# Patient Record
Sex: FEMALE | Race: WHITE | NOT HISPANIC OR LATINO | Employment: OTHER | URBAN - NONMETROPOLITAN AREA
[De-identification: names, ages, dates, MRNs, and addresses within clinical notes are randomized per-mention and may not be internally consistent; named-entity substitution may affect disease eponyms.]

---

## 2017-01-09 ENCOUNTER — OFFICE VISIT (OUTPATIENT)
Dept: INTERNAL MEDICINE | Facility: CLINIC | Age: 62
End: 2017-01-09

## 2017-01-09 VITALS
BODY MASS INDEX: 28.4 KG/M2 | SYSTOLIC BLOOD PRESSURE: 120 MMHG | DIASTOLIC BLOOD PRESSURE: 70 MMHG | HEIGHT: 66 IN | WEIGHT: 176.7 LBS

## 2017-01-09 DIAGNOSIS — N39.0 URINARY TRACT INFECTION, SITE UNSPECIFIED: Primary | ICD-10-CM

## 2017-01-09 DIAGNOSIS — R31.9 HEMATURIA: ICD-10-CM

## 2017-01-09 LAB
BILIRUB BLD-MCNC: NEGATIVE MG/DL
CLARITY, POC: ABNORMAL
COLOR UR: YELLOW
GLUCOSE UR STRIP-MCNC: NEGATIVE MG/DL
KETONES UR QL: NEGATIVE
LEUKOCYTE EST, POC: ABNORMAL
NITRITE UR-MCNC: NEGATIVE MG/ML
PH UR: 6 [PH] (ref 5–8)
PROT UR STRIP-MCNC: ABNORMAL MG/DL
RBC # UR STRIP: ABNORMAL /UL
SP GR UR: 1.01 (ref 1–1.03)
UROBILINOGEN UR QL: NORMAL

## 2017-01-09 PROCEDURE — 99212 OFFICE O/P EST SF 10 MIN: CPT | Performed by: INTERNAL MEDICINE

## 2017-01-09 PROCEDURE — 81002 URINALYSIS NONAUTO W/O SCOPE: CPT | Performed by: INTERNAL MEDICINE

## 2017-01-09 RX ORDER — PHENAZOPYRIDINE HYDROCHLORIDE 95 MG/1
95 TABLET ORAL 3 TIMES DAILY PRN
Qty: 15 TABLET | Refills: 0 | Status: SHIPPED | OUTPATIENT
Start: 2017-01-09 | End: 2017-01-12

## 2017-01-09 RX ORDER — CIPROFLOXACIN 500 MG/1
500 TABLET, FILM COATED ORAL 2 TIMES DAILY
Qty: 14 TABLET | Refills: 0 | Status: SHIPPED | OUTPATIENT
Start: 2017-01-09 | End: 2017-09-08

## 2017-01-09 RX ORDER — NITROFURANTOIN 25; 75 MG/1; MG/1
100 CAPSULE ORAL 2 TIMES DAILY
Refills: 0 | COMMUNITY
Start: 2016-12-24 | End: 2018-01-04

## 2017-01-09 NOTE — MR AVS SNAPSHOT
Humera Swartz   1/9/2017 3:15 PM   Office Visit    Dept Phone:  [phone]   Encounter #:  [id number]    Provider:  Devora Jiménez MD   Department:  [facility] MEDIC[facility]                Your Full Care Plan              Today's Medication Changes          These changes are accurate as of: 1/9/17  4:48 PM.  If you have any questions, ask your nurse or doctor.               New Medication(s)Ordered:     ciprofloxacin 500 MG tablet   Commonly known as:  CIPRO   Take 1 tablet by mouth 2 (Two) Times a Day.   Started by:  Devora Jiménez MD       phenazopyridine 95 MG tablet   Commonly known as:  PYRIDIUM   Take 1 tablet by mouth 3 (Three) Times a Day As Needed for bladder spasms.   Started by:  Devora Jiménez MD            Where to Get Your Medications      These medications were sent to [facility]/pharmacy #[id number] - [place], [place] - [address] [place] [address] [phone] [facility] [phone] [address] [address]     Phone:  [phone]     ciprofloxacin 500 MG tablet    phenazopyridine 95 MG tablet                  Your Updated Medication List          This list is accurate as of: 1/9/17  4:48 PM.  Always use your most recent med list.                anastrozole 1 MG tablet   Commonly known as:  ARIMIDEX       busPIRone 5 MG tablet   Commonly known as:  BUSPAR   TAKE 1 TABLET(S) BY MOUTH 2 TIMES PER DAY       ciprofloxacin 500 MG tablet   Commonly known as:  CIPRO   Take 1 tablet by mouth 2 (Two) Times a Day.       clobetasol propionate 0.05 % shampoo   Commonly known as:  CLOBEX       fluticasone 50 MCG/ACT nasal spray   Commonly known as:  FLONASE       nitrofurantoin (macrocrystal-monohydrate) 100 MG capsule   Commonly known as:  MACROBID       omeprazole 20 MG capsule   Commonly known as:  priLOSEC   TAKE 1 CAP(S) BY MOUTH DAILY       phenazopyridine 95 MG tablet   Commonly known as:  PYRIDIUM   Take 1 tablet by mouth 3 (Three) Times a Day As Needed for  "bladder spasms.       sertraline 100 MG tablet   Commonly known as:  ZOLOFT   TAKE 1/2 TABLET BY MOUTH EVERY DAY               We Performed the Following     POC Urinalysis Dipstick       You Were Diagnosed With        Codes Comments    Urinary tract infection, site unspecified    -  Primary ICD-10-CM: N39.0       Instructions     None    Patient Instructions History      Upcoming Appointments     Visit Type Date Time Department    OFFICE VISIT 2017  3:15 PM [facility]      [facility] Signup     [facility] allows you to send messages to your doctor, view your test results, renew your prescriptions, schedule appointments, and more. To sign up, go to [employer] and click on the Sign Up Now link in the New User? box. Enter your [employer] Activation Code exactly as it appears below along with the last four digits of your Social Security Number and your Date of Birth () to complete the sign-up process. If you do not sign up before the expiration date, you must request a new code.    [employer] Activation Code: [email or web address]  Expires: 2017  4:48 PM    If you have questions, you can email [email or web address] or call [phone] to talk to our [employer] staff. Remember, [employer] is NOT to be used for urgent needs. For medical emergencies, dial 911.               Other Info from Your Visit           Allergies     Erythromycin      Phenergan [Promethazine Hcl]      Promethazine      Propofol      COUGH/WHEEZE    Tetracyclines & Related        Reason for Visit     Abdominal Pain lower right side being treated for uti now      Vital Signs     Blood Pressure Height Weight Body Mass Index Smoking Status       120/70 66\" (167.6 cm) 176 lb 11.2 oz (80.2 kg) 28.52 kg/m2 Former Smoker       Problems and Diagnoses Noted     Urinary tract infection    -  Primary      Results     POC Urinalysis Dipstick      Component Value Standard Range & Units    Color Yellow Yellow, Straw, Dark " Yellow, Cathie    Clarity, UA Cloudy Clear    Glucose, UA Negative Negative, 1000 mg/dL (3+) mg/dL    Bilirubin Negative Negative    Ketones, UA Negative Negative    Specific Gravity  1.015 1.005 - 1.030    Blood, UA Trace Negative    pH, Urine 6.0 5.0 - 8.0    Protein, POC Trace Negative mg/dL    Urobilinogen, UA Normal Normal    Leukocytes Trace Negative    Nitrite, UA Negative Negative

## 2017-01-10 ENCOUNTER — HOSPITAL ENCOUNTER (OUTPATIENT)
Dept: OTHER | Facility: HOSPITAL | Age: 62
Setting detail: RADIATION/ONCOLOGY SERIES
Discharge: HOME | End: 2017-01-20
Attending: INTERNAL MEDICINE | Admitting: INTERNAL MEDICINE

## 2017-01-10 LAB
ALBUMIN SERPL-MCNC: 4.2 GM/DL (ref 3.4–4.8)
ALP SERPL-CCNC: 106 U/L (ref 38–126)
ALT SERPL-CCNC: 38 U/L (ref 9–52)
ANION GAP SERPL CALCULATED.3IONS-SCNC: 11 MMOL/L (ref 5–15)
AST SERPL-CCNC: 27 U/L (ref 14–36)
BASOPHILS # BLD AUTO: 0.03 X1000/UL (ref 0–0.2)
BASOPHILS NFR BLD AUTO: 0.5 % (ref 0–2)
BILIRUB SERPL-MCNC: 0.9 MG/DL (ref 0.2–1.3)
BUN SERPL-MCNC: 17 MG/DL (ref 7–21)
CALCIUM SERPL-MCNC: 9.5 MG/DL (ref 8.4–10.2)
CHLORIDE SERPL-SCNC: 101 MMOL/L (ref 95–110)
CO2 SERPL-SCNC: 29 MMOL/L (ref 22–31)
CONV AUTO IG#: 0.01 X1000/UL (ref 0.01–0.02)
CONV AUTO IG%: 0.2 % (ref 0–0.5)
CREAT SERPL-MCNC: 0.7 MG/DL (ref 0.5–1)
EOSINOPHIL # BLD AUTO: 0.11 X1000/UL (ref 0–0.7)
EOSINOPHIL NFR BLD AUTO: 1.8 % (ref 0–7)
ERYTHROCYTE [DISTWIDTH] IN BLOOD: 13.6 % (ref 11.5–14.5)
GLUCOSE SERPL-MCNC: 110 MG/DL (ref 60–100)
GRANULOCYTES # BLD AUTO: 3.99 X1000/UL (ref 2–8.6)
GRANULOCYTES NFR BLD AUTO: 65.5 % (ref 37–80)
HCT VFR BLD CALC: 42.2 % (ref 35–45)
HGB BLD-MCNC: 14.5 GM/DL (ref 12–15.5)
LYMPHOCYTES # BLD AUTO: 1.59 X1000/UL (ref 0.6–4.2)
LYMPHOCYTES NFR BLD AUTO: 26.2 % (ref 10–50)
MCH RBC QN: 30.5 PG (ref 26–34)
MCHC RBC-ENTMCNC: 34.4 GM/DL (ref 31.4–36)
MCV RBC: 88.7 FL (ref 80–98)
MONOCYTES # BLD AUTO: 0.35 X1000/UL (ref 0–0.9)
MONOCYTES NFR BLD AUTO: 5.8 % (ref 0–12)
NRBC BLD AUTO-RTO: 0 % (ref 0–0.2)
NRBC SPEC MANUAL: 0 X1000/UL
PLATELET # BLD: 229 X1000/MM3 (ref 150–450)
PMV BLD: 10 FL (ref 8–12)
POTASSIUM SERPL-SCNC: 3.7 MMOL/L (ref 3.5–5.1)
PROT SERPL-MCNC: 7.4 GM/DL (ref 6.3–8.6)
RBC # BLD: 4.76 MEGA/MM3 (ref 3.77–5.16)
SODIUM SERPL-SCNC: 141 MMOL/L (ref 137–145)
WBC # BLD: 6.1 X1000/UL (ref 3.2–9.8)

## 2017-01-10 NOTE — PROGRESS NOTES
Subjective   Humera Swartz is a 61 y.o. female who presents complaining of lower abdominal pain, frequency, urgency of urination and dysuria.  She also had some right sided flank pain.  Patient was seen in [facility] and was prescribed Macrobid for UTI without any improvement in her symptoms.  She had recurrent UTI s in the last few months.  POC Urinalysis dipstick is positive for leukocytes and blood.        Past Medical History   Diagnosis Date   • Acquired equinus deformity of foot      ANKLE   • Anxiety    • Cancer      BREAST   • Depression    • Diverticular disease of colon    • Esophagitis    • GERD (gastroesophageal reflux disease)    • History of bone density study 2012     NORMAL   • History of echocardiogram 2016     Normal LV systolic function.Ef of 55-60%.Grade 1 diastolic dysfunciton of the LV myocardium.Mild left atiral enlargement.No evidence of pericardial effusion   • History of mammogram 2011     one breast (Benign finding.)   • History of Papanicolaou smear of cervix 2011     NEGATIVE   • Hyperlipidemia    • Injury of head and neck    • Minor head injury    • Personal history of malignant neoplasm of breast    • Plantar fasciitis    • Primary fibromyalgia syndrome    • Solitary pulmonary nodule present on computed tomography of lung      Past Surgical History   Procedure Laterality Date   •  section     • Colonoscopy  2016     Normal colon.No specimens collected   • Esophagoscopy / egd  2016     Mildly severe esophagitis.Gastritis.Normal examined duodenum.Medium sized hiatus hernia   • Tubal abdominal ligation     • Hysteroscopy  2011     Exam under anesthesia, diagnostic hysterectomy with fractional dilation and curettage. Thickened endometrial stripe, on Tamoxifen therapy.   • Injection of medication  2012     Kenalog (1)    • Diagnostic laparoscopy  1977     (Amenorrhea, probable polycystic ovaries. Polycystic ovaries   •  Breast surgery       Carcinoma of the right breast;Mastectomy   • Other surgical history  02/12/2016     NEEDLE BIOPSY LYMPH NODES; Ultrasound directed core needle biopsy of the left axilla.   • Excision breast lesion w/ preop needle loc  06/17/1987     Bilateral excision of breast masses. Bilateral breast masses; probable fibrocystic disease.       Social History   Substance Use Topics   • Smoking status: Former Smoker   • Smokeless tobacco: Never Used      Comment: smoked socially long time ago   • Alcohol use No     Family History   Problem Relation Age of Onset   • Diabetes Other    • Arthritis Other      Allergies   Allergen Reactions   • Erythromycin    • Phenergan [Promethazine Hcl]    • Promethazine    • Propofol      COUGH/WHEEZE   • Tetracyclines & Related      Current Outpatient Prescriptions on File Prior to Visit   Medication Sig Dispense Refill   • anastrozole (ARIMIDEX) 1 MG chemo tablet TAKE 1 TABLET BY MOUTH DAILY.  6   • busPIRone (BUSPAR) 5 MG tablet TAKE 1 TABLET(S) BY MOUTH 2 TIMES PER DAY 30 tablet 0   • clobetasol propionate (CLOBEX) 0.05 % shampoo APPLY TO AFFECTED AREA TWICE A DAY LIMIT 4 WEEK INTERVALS DO NOT APPLY TO FACE OR FOLDS  4   • sertraline (ZOLOFT) 100 MG tablet TAKE 1/2 TABLET BY MOUTH EVERY DAY 30 tablet 2   • fluticasone (FLONASE) 50 MCG/ACT nasal spray INSTILL 2 SPRYAS INTO EACH NOSTRIL DAILY  0   • omeprazole (PriLOSEC) 20 MG capsule TAKE 1 CAP(S) BY MOUTH DAILY 30 capsule 0     No current facility-administered medications on file prior to visit.      Review of Systems   Constitutional: Negative for chills and fever.   Respiratory: Negative for cough and shortness of breath.    Cardiovascular: Negative for chest pain and leg swelling.   Gastrointestinal: Positive for nausea. Negative for constipation, diarrhea and vomiting.        Positive for lower abdominal pain   Genitourinary: Positive for dysuria, flank pain, frequency and urgency. Negative for vaginal bleeding, vaginal  discharge and vaginal pain.       Objective   Physical Exam   Constitutional: She is oriented to person, place, and time. She appears well-developed and well-nourished.   HENT:   Head: Normocephalic and atraumatic.   Neck: Neck supple. No JVD present.   Cardiovascular: Normal rate and regular rhythm.    Pulmonary/Chest: Effort normal and breath sounds normal.   Abdominal: Soft. She exhibits no mass. There is tenderness. No hernia.   Mild tenderness on palpation of lower abdominal area   Neurological: She is alert and oriented to person, place, and time.     Results for orders placed or performed in visit on 01/09/17   POC Urinalysis Dipstick   Result Value Ref Range    Color Yellow Yellow, Straw, Dark Yellow, Cathie    Clarity, UA Cloudy (A) Clear    Glucose, UA Negative Negative, 1000 mg/dL (3+) mg/dL    Bilirubin Negative Negative    Ketones, UA Negative Negative    Specific Gravity  1.015 1.005 - 1.030    Blood, UA Trace (A) Negative    pH, Urine 6.0 5.0 - 8.0    Protein, POC Trace (A) Negative mg/dL    Urobilinogen, UA Normal Normal    Leukocytes Trace (A) Negative    Nitrite, UA Negative Negative         There is no problem list on file for this patient.              Assessment    Diagnosis Plan   1. Urinary tract infection, site unspecified  POC Urinalysis Dipstick    Urinalysis (clean catch)   2. Hematuria           Plan   Cipro 500 mg bid for 1 week.  Phenazopyridine 95 mg tid PRN.  Advised to increase intake of fluids.  Will recheck urinalysis after she finishes antibiotics.  If hematuria persists, patient will be referred to urologist for further evaluation.

## 2017-01-11 LAB — CANCER AG27-29 SERPL-ACNC: 22.9 U/ML (ref 0–38.6)

## 2017-01-12 ENCOUNTER — OFFICE VISIT (OUTPATIENT)
Dept: GASTROENTEROLOGY | Facility: CLINIC | Age: 62
End: 2017-01-12

## 2017-01-12 VITALS
BODY MASS INDEX: 28.61 KG/M2 | HEART RATE: 75 BPM | HEIGHT: 66 IN | DIASTOLIC BLOOD PRESSURE: 71 MMHG | WEIGHT: 178 LBS | SYSTOLIC BLOOD PRESSURE: 125 MMHG

## 2017-01-12 DIAGNOSIS — K21.00 GASTROESOPHAGEAL REFLUX DISEASE WITH ESOPHAGITIS: ICD-10-CM

## 2017-01-12 DIAGNOSIS — K44.9 HIATAL HERNIA: ICD-10-CM

## 2017-01-12 DIAGNOSIS — R10.13 EPIGASTRIC PAIN: Primary | ICD-10-CM

## 2017-01-12 PROCEDURE — 99213 OFFICE O/P EST LOW 20 MIN: CPT | Performed by: NURSE PRACTITIONER

## 2017-01-12 RX ORDER — DEXLANSOPRAZOLE 60 MG/1
60 CAPSULE, DELAYED RELEASE ORAL DAILY
Qty: 30 CAPSULE | Refills: 5 | Status: SHIPPED | OUTPATIENT
Start: 2017-01-12 | End: 2017-02-11

## 2017-01-12 RX ORDER — RANITIDINE 150 MG/1
150 TABLET ORAL 2 TIMES DAILY
COMMUNITY
End: 2017-01-12

## 2017-01-12 NOTE — PROGRESS NOTES
Chief Complaint   Patient presents with   • Abdominal Pain     Right Lower Quadrant   • Patel's Esophagus   • Reoccuring Urinary Tract Infection       Subjective    Humera Swartz is a 61 y.o. female. she is here today for follow-up.    Abdominal Pain   This is a chronic problem. The current episode started more than 1 year ago. The pain is located in the RLQ. The pain is mild. The quality of the pain is colicky. The abdominal pain radiates to the back and RUQ. Associated symptoms include hematochezia. Pertinent negatives include no anorexia, arthralgias, diarrhea, fever, flatus, melena, myalgias, nausea, vomiting or weight loss. The treatment provided mild relief. Prior diagnostic workup includes GI consult and upper endoscopy.   patient   I would like to discuss recurrent epigastric pain that radiates to the right side of her back.  Previous he had a EGD colonoscopy,ultrasound of the right upper quadrant and CT scan.  Reports she was concern for side effects from Prilosec and change to Zantac and had worsening symptoms.  Previous EGD noted esophagitis and gastritis.  Have discussed dietary measures to decrease GERD symptoms.   PLAN; Patient will start Dexilant and follow-up in 6 weeks if symptoms persist or worsen return sooner.  We'll consider pH study.    The following portions of the patient's history were reviewed and updated as appropriate:   Past Medical History   Diagnosis Date   • Acquired equinus deformity of foot      ANKLE   • Anxiety    • Cancer      BREAST   • Depression    • Diverticular disease of colon    • Esophagitis    • GERD (gastroesophageal reflux disease)    • History of bone density study 01/01/2012     NORMAL   • History of echocardiogram 07/11/2016     Normal LV systolic function.Ef of 55-60%.Grade 1 diastolic dysfunciton of the LV myocardium.Mild left atiral enlargement.No evidence of pericardial effusion   • History of mammogram 07/18/2011     one breast (Benign finding.)   •  History of Papanicolaou smear of cervix 2011     NEGATIVE   • Hyperlipidemia    • Injury of head and neck    • Minor head injury    • Personal history of malignant neoplasm of breast    • Plantar fasciitis    • Primary fibromyalgia syndrome    • Solitary pulmonary nodule present on computed tomography of lung      Past Surgical History   Procedure Laterality Date   •  section     • Colonoscopy  2016     Normal colon.No specimens collected   • Esophagoscopy / egd  2016     Mildly severe esophagitis.Gastritis.Normal examined duodenum.Medium sized hiatus hernia   • Tubal abdominal ligation     • Hysteroscopy  2011     Exam under anesthesia, diagnostic hysterectomy with fractional dilation and curettage. Thickened endometrial stripe, on Tamoxifen therapy.   • Injection of medication  2012     Kenalog (1)    • Diagnostic laparoscopy  1977     (Amenorrhea, probable polycystic ovaries. Polycystic ovaries   • Breast surgery       Carcinoma of the right breast;Mastectomy   • Other surgical history  2016     NEEDLE BIOPSY LYMPH NODES; Ultrasound directed core needle biopsy of the left axilla.   • Excision breast lesion w/ preop needle loc  1987     Bilateral excision of breast masses. Bilateral breast masses; probable fibrocystic disease.     Family History   Problem Relation Age of Onset   • Diabetes Other    • Arthritis Other      OB History      Para Term  AB TAB SAB Ectopic Multiple Living    3 2 2  1  1   2        Current Outpatient Prescriptions   Medication Sig Dispense Refill   • anastrozole (ARIMIDEX) 1 MG chemo tablet TAKE 1 TABLET BY MOUTH DAILY.  6   • busPIRone (BUSPAR) 5 MG tablet TAKE 1 TABLET(S) BY MOUTH 2 TIMES PER DAY 30 tablet 0   • ciprofloxacin (CIPRO) 500 MG tablet Take 1 tablet by mouth 2 (Two) Times a Day. 14 tablet 0   • clobetasol propionate (CLOBEX) 0.05 % shampoo APPLY TO AFFECTED AREA TWICE A DAY LIMIT 4 WEEK INTERVALS DO NOT  "APPLY TO FACE OR FOLDS  4   • nitrofurantoin, macrocrystal-monohydrate, (MACROBID) 100 MG capsule Take 100 mg by mouth 2 (Two) Times a Day.  0   • sertraline (ZOLOFT) 100 MG tablet TAKE 1/2 TABLET BY MOUTH EVERY DAY 30 tablet 2   • dexlansoprazole (DEXILANT) 60 MG capsule Take 1 capsule by mouth Daily for 30 days. 30 capsule 5     No current facility-administered medications for this visit.      Allergies   Allergen Reactions   • Erythromycin    • Phenergan [Promethazine Hcl]    • Promethazine    • Propofol      COUGH/WHEEZE   • Tetracyclines & Related      Social History     Social History   • Marital status: Single     Spouse name: N/A   • Number of children: N/A   • Years of education: N/A     Social History Main Topics   • Smoking status: Former Smoker   • Smokeless tobacco: Never Used      Comment: Ceased Smoking 12 Years Prior   • Alcohol use No   • Drug use: No   • Sexual activity: Defer     Other Topics Concern   • None     Social History Narrative       Review of Systems  Review of Systems   Constitutional: Negative for fever and weight loss.   Gastrointestinal: Positive for abdominal pain and hematochezia. Negative for anorexia, diarrhea, flatus, melena, nausea and vomiting.   Musculoskeletal: Negative for arthralgias and myalgias.        Visit Vitals   • /71 (BP Location: Left arm, Patient Position: Sitting, Cuff Size: Adult)   • Pulse 75   • Ht 66\" (167.6 cm)   • Wt 178 lb (80.7 kg)   • BMI 28.73 kg/m2       Objective    Physical Exam   Constitutional: She is oriented to person, place, and time. She appears well-developed and well-nourished. She is cooperative. No distress.   HENT:   Head: Normocephalic and atraumatic.   Neck: Normal range of motion. Neck supple. No thyromegaly present.   Cardiovascular: Normal rate, regular rhythm and normal heart sounds.    Pulmonary/Chest: Effort normal and breath sounds normal. She has no wheezes. She has no rhonchi. She has no rales.   Abdominal: Soft. Normal " appearance and bowel sounds are normal. She exhibits no shifting dullness, no distension and no fluid wave. There is no hepatosplenomegaly. There is no tenderness. There is no rigidity and no guarding. No hernia.   Lymphadenopathy:     She has no cervical adenopathy.   Neurological: She is alert and oriented to person, place, and time.   Skin: Skin is warm, dry and intact. No rash noted. No pallor.   Psychiatric: She has a normal mood and affect. Her speech is normal.     Hospital Outpatient Visit on 01/10/2017   Component Date Value Ref Range Status   • WBC 01/10/2017 6.1  3.2 - 9.8 x1000/uL Final   • RBC 01/10/2017 4.76  3.77 - 5.16 jennifer/mm3 Final   • Hemoglobin 01/10/2017 14.5  12.0 - 15.5 gm/dl Final   • Hematocrit 01/10/2017 42.2  35.0 - 45.0 % Final   • MCV 01/10/2017 88.7  80.0 - 98.0 fl Final   • MCH 01/10/2017 30.5  26.0 - 34.0 pg Final   • MCHC 01/10/2017 34.4  31.4 - 36.0 gm/dl Final   • RDW 01/10/2017 13.6  11.5 - 14.5 % Final   • Platelets 01/10/2017 229  150 - 450 x1000/mm3 Final   • MPV 01/10/2017 10.0  8.0 - 12.0 fl Final   • Neutrophil Rel % 01/10/2017 65.5  37.0 - 80.0 % Final   • Lymphocyte Rel % 01/10/2017 26.2  10.0 - 50.0 % Final   • Monocyte Rel % 01/10/2017 5.8  0.0 - 12.0 % Final   • Eosinophil Rel % 01/10/2017 1.8  0.0 - 7.0 % Final   • Basophil Rel % 01/10/2017 0.5  0.0 - 2.0 % Final   • Immature Granulocyte Rel % 01/10/2017 0.20  0.00 - 0.50 % Final   • Neutrophils Absolute 01/10/2017 3.99  2.00 - 8.60 x1000/uL Final   • Lymphocytes Absolute 01/10/2017 1.59  0.60 - 4.20 x1000/uL Final   • Monocytes Absolute 01/10/2017 0.35  0.00 - 0.90 x1000/uL Final   • Eosinophils Absolute 01/10/2017 0.11  0.00 - 0.70 x1000/uL Final   • Basophils Absolute 01/10/2017 0.03  0.00 - 0.20 x1000/uL Final   • Immature Granulocytes Absolute 01/10/2017 0.010  0.005 - 0.022 x1000/uL Final   • nRBC 01/10/2017 0.0  0.0 - 0.2 % Final   • nRBC 01/10/2017 0.000  x1000/uL Final   • Sodium 01/10/2017 141  137 - 145  mmol/L Final   • Potassium 01/10/2017 3.7  3.5 - 5.1 mmol/L Final   • Chloride 01/10/2017 101  95 - 110 mmol/L Final   • CO2 01/10/2017 29  22 - 31 mmol/L Final   • Anion Gap 01/10/2017 11.0  5.0 - 15.0 mmol/L Final   • Glucose 01/10/2017 110* 60 - 100 mg/dl Final   • BUN 01/10/2017 17  7 - 21 mg/dl Final   • Creatinine 01/10/2017 0.7  0.5 - 1.0 mg/dl Final   • GFR MDRD Non  01/10/2017 86  51 - 120 mL/min/1.73 sq.M Final    Comment: Invalid if creatinine is changing or the patient is on dialysis. Use AA  result if patient is -American, non AA result otherwise.     • GFR MDRD  01/10/2017 104  51 - 120 mL/min/1.73 sq.M Final   • Calcium 01/10/2017 9.5  8.4 - 10.2 mg/dl Final   • Total Protein 01/10/2017 7.4  6.3 - 8.6 gm/dl Final   • Albumin 01/10/2017 4.2  3.4 - 4.8 gm/dl Final   • Total Bilirubin 01/10/2017 0.9  0.2 - 1.3 mg/dl Final   • Alkaline Phosphatase 01/10/2017 106  38 - 126 U/L Final   • AST (SGOT) 01/10/2017 27  14 - 36 U/L Final   • ALT (SGPT) 01/10/2017 38  9 - 52 U/L Final   • CA 27.29 01/10/2017 22.9  0.0 - 38.6 U/mL Final    Comment: [email or web address] methodology  Performed at:   - [facility]  [id number]  : Michelet Ewing PhD, Phone:  [phone]       Assessment/Plan      1. Epigastric pain    2. Hiatal hernia    3. Gastroesophageal reflux disease with esophagitis    .       Review and/or summary of lab tests, radiology, procedures, medications. Review and summary of old records and obtaining of history. The risks and benefits of my recommendations, as well as other treatment options were discussed with the patient today. Questions were answered.    New Medications Ordered This Visit   Medications   • dexlansoprazole (DEXILANT) 60 MG capsule     Sig: Take 1 capsule by mouth Daily for 30 days.     Dispense:  30 capsule     Refill:  5       Follow-up: Return in about 6 weeks (around 2/23/2017).          This  document has been electronically signed by DAVID Ho on January 12, 2017 3:38 PM             Results for orders placed or performed during the hospital encounter of 01/10/17   Cancer Antigen 27.29   Result Value Ref Range    CA 27.29 22.9 0.0 - 38.6 U/mL   CBC & Differential   Result Value Ref Range    WBC 6.1 3.2 - 9.8 x1000/uL    RBC 4.76 3.77 - 5.16 jennifer/mm3    Hemoglobin 14.5 12.0 - 15.5 gm/dl    Hematocrit 42.2 35.0 - 45.0 %    MCV 88.7 80.0 - 98.0 fl    MCH 30.5 26.0 - 34.0 pg    MCHC 34.4 31.4 - 36.0 gm/dl    RDW 13.6 11.5 - 14.5 %    Platelets 229 150 - 450 x1000/mm3    MPV 10.0 8.0 - 12.0 fl    Neutrophil Rel % 65.5 37.0 - 80.0 %    Lymphocyte Rel % 26.2 10.0 - 50.0 %    Monocyte Rel % 5.8 0.0 - 12.0 %    Eosinophil Rel % 1.8 0.0 - 7.0 %    Basophil Rel % 0.5 0.0 - 2.0 %    Immature Granulocyte Rel % 0.20 0.00 - 0.50 %    Neutrophils Absolute 3.99 2.00 - 8.60 x1000/uL    Lymphocytes Absolute 1.59 0.60 - 4.20 x1000/uL    Monocytes Absolute 0.35 0.00 - 0.90 x1000/uL    Eosinophils Absolute 0.11 0.00 - 0.70 x1000/uL    Basophils Absolute 0.03 0.00 - 0.20 x1000/uL    Immature Granulocytes Absolute 0.010 0.005 - 0.022 x1000/uL    nRBC 0.0 0.0 - 0.2 %    nRBC 0.000 x1000/uL   Comprehensive Metabolic Panel   Result Value Ref Range    Sodium 141 137 - 145 mmol/L    Potassium 3.7 3.5 - 5.1 mmol/L    Chloride 101 95 - 110 mmol/L    CO2 29 22 - 31 mmol/L    Anion Gap 11.0 5.0 - 15.0 mmol/L    Glucose 110 (H) 60 - 100 mg/dl    BUN 17 7 - 21 mg/dl    Creatinine 0.7 0.5 - 1.0 mg/dl    GFR MDRD Non  86 51 - 120 mL/min/1.73 sq.M    GFR MDRD  104 51 - 120 mL/min/1.73 sq.M    Calcium 9.5 8.4 - 10.2 mg/dl    Total Protein 7.4 6.3 - 8.6 gm/dl    Albumin 4.2 3.4 - 4.8 gm/dl    Total Bilirubin 0.9 0.2 - 1.3 mg/dl    Alkaline Phosphatase 106 38 - 126 U/L    AST (SGOT) 27 14 - 36 U/L    ALT (SGPT) 38 9 - 52 U/L   Results for orders placed or performed in visit on 01/09/17   POC Urinalysis  Dipstick   Result Value Ref Range    Color Yellow Yellow, Straw, Dark Yellow, Cathie    Clarity, UA Cloudy (A) Clear    Glucose, UA Negative Negative, 1000 mg/dL (3+) mg/dL    Bilirubin Negative Negative    Ketones, UA Negative Negative    Specific Gravity  1.015 1.005 - 1.030    Blood, UA Trace (A) Negative    pH, Urine 6.0 5.0 - 8.0    Protein, POC Trace (A) Negative mg/dL    Urobilinogen, UA Normal Normal    Leukocytes Trace (A) Negative    Nitrite, UA Negative Negative   Results for orders placed or performed during the hospital encounter of 11/23/16   Iron Profile   Result Value Ref Range    Iron 42 37 - 170 ug/dl    TIBC 338 265 - 497 ug/dl    Iron Saturation 12.4 (L) 15.0 - 50.0 %   CBC & Differential   Result Value Ref Range    WBC 7.4 3.2 - 9.8 x1000/uL    RBC 4.63 3.77 - 5.16 jennifer/mm3    Hemoglobin 14.1 12.0 - 15.5 gm/dl    Hematocrit 40.7 35.0 - 45.0 %    MCV 87.9 80.0 - 98.0 fl    MCH 30.5 26.0 - 34.0 pg    MCHC 34.6 31.4 - 36.0 gm/dl    RDW 13.7 11.5 - 14.5 %    Platelets 259 150 - 450 x1000/mm3    MPV 10.4 8.0 - 12.0 fl    Neutrophil Rel % 71.0 37.0 - 80.0 %    Lymphocyte Rel % 20.9 10.0 - 50.0 %    Monocyte Rel % 6.7 0.0 - 12.0 %    Eosinophil Rel % 0.9 0.0 - 7.0 %    Basophil Rel % 0.4 0.0 - 2.0 %    Immature Granulocyte Rel % 0.10 0.00 - 0.50 %    Neutrophils Absolute 5.27 2.00 - 8.60 x1000/uL    Lymphocytes Absolute 1.55 0.60 - 4.20 x1000/uL    Monocytes Absolute 0.50 0.00 - 0.90 x1000/uL    Eosinophils Absolute 0.07 0.00 - 0.70 x1000/uL    Basophils Absolute 0.03 0.00 - 0.20 x1000/uL    Immature Granulocytes Absolute 0.010 0.005 - 0.022 x1000/uL   TSH   Result Value Ref Range    TSH 1.40 0.46 - 4.68 uIU/ml   T4, Free   Result Value Ref Range    Free T4 1.06 0.78 - 2.19 ng/dl   Comprehensive Metabolic Panel   Result Value Ref Range    Sodium 141 137 - 145 mmol/L    Potassium 3.5 3.5 - 5.1 mmol/L    Chloride 100 95 - 110 mmol/L    CO2 28 22 - 31 mmol/L    Anion Gap 13.0 5.0 - 15.0 mmol/L    Glucose  105 (H) 60 - 100 mg/dl    BUN 21 7 - 21 mg/dl    Creatinine 0.8 0.5 - 1.0 mg/dl    GFR MDRD Non  73 51 - 120 mL/min/1.73 sq.M    GFR MDRD  89 51 - 120 mL/min/1.73 sq.M    Calcium 9.5 8.4 - 10.2 mg/dl    Total Protein 7.5 6.3 - 8.6 gm/dl    Albumin 4.0 3.4 - 4.8 gm/dl    Total Bilirubin 0.6 0.2 - 1.3 mg/dl    Alkaline Phosphatase 113 38 - 126 U/L    AST (SGOT) 28 14 - 36 U/L    ALT (SGPT) 30 9 - 52 U/L   Results for orders placed or performed during the hospital encounter of 11/04/16   proBNP   Result Value Ref Range    NT-proBNP 88 0 - 900 pg/ml   Urinalysis, Microscopic Only   Result Value Ref Range    WBC, UA 31-50 (H) NONE SEEN,0-2,3-5  /HPF    RBC, UA 13-20 (H) NONE SEEN,0-2,3-5  /HPF    Epithelial cells 6-12 (H) NONE SEEN,0-2,3-5  /HPF    Bacteria, UA NONE SEEN NONE SEEN     *Note: Due to a large number of results and/or encounters for the requested time period, some results have not been displayed. A complete set of results can be found in Results Review.

## 2017-01-12 NOTE — PATIENT INSTRUCTIONS
Food Choices for Gastroesophageal Reflux Disease, Adult  When you have gastroesophageal reflux disease (GERD), the foods you eat and your eating habits are very important. Choosing the right foods can help ease your discomfort.   WHAT GUIDELINES DO I NEED TO FOLLOW?   · Choose fruits, vegetables, whole grains, and low-fat dairy products.    · Choose low-fat meat, fish, and poultry.  · Limit fats such as oils, salad dressings, butter, nuts, and avocado.    · Keep a food diary. This helps you identify foods that cause symptoms.    · Avoid foods that cause symptoms. These may be different for everyone.    · Eat small meals often instead of 3 large meals a day.    · Eat your meals slowly, in a place where you are relaxed.    · Limit fried foods.    · Cook foods using methods other than frying.    · Avoid drinking alcohol.    · Avoid drinking large amounts of liquids with your meals.    · Avoid bending over or lying down until 2-3 hours after eating.    WHAT FOODS ARE NOT RECOMMENDED?   These are some foods and drinks that may make your symptoms worse:  Vegetables  Tomatoes. Tomato juice. Tomato and spaghetti sauce. Chili peppers. Onion and garlic. Horseradish.  Fruits  Oranges, grapefruit, and lemon (fruit and juice).  Meats  High-fat meats, fish, and poultry. This includes hot dogs, ribs, ham, sausage, salami, and negrete.  Dairy  Whole milk and chocolate milk. Sour cream. Cream. Butter. Ice cream. Cream cheese.   Drinks  Coffee and tea. Bubbly (carbonated) drinks or energy drinks.  Condiments  Hot sauce. Barbecue sauce.   Sweets/Desserts  Chocolate and cocoa. Donuts. Peppermint and spearmint.  Fats and Oils  High-fat foods. This includes French fries and potato chips.  Other  Vinegar. Strong spices. This includes black pepper, white pepper, red pepper, cayenne, adams powder, cloves, ginger, and chili powder.  The items listed above may not be a complete list of foods and drinks to avoid. Contact your dietitian for more  information.     This information is not intended to replace advice given to you by your health care provider. Make sure you discuss any questions you have with your health care provider.     Document Released: 06/18/2013 Document Revised: 01/08/2016 Document Reviewed: 10/22/2014  [employer] Interactive Patient Education ©2016 [employer]    Gastroesophageal Reflux Disease, Adult  Normally, food travels down the esophagus and stays in the stomach to be digested. If a person has gastroesophageal reflux disease (GERD), food and stomach acid move back up into the esophagus. When this happens, the esophagus becomes sore and swollen (inflamed). Over time, GERD can make small holes (ulcers) in the lining of the esophagus.  HOME CARE  Diet   · Follow a diet as told by your doctor. You may need to avoid foods and drinks such as:    Coffee and tea (with or without caffeine).    Drinks that contain alcohol.    Energy drinks and sports drinks.    Carbonated drinks or sodas.    Chocolate and cocoa.    Peppermint and mint flavorings.    Garlic and onions.    Horseradish.    Spicy and acidic foods, such as peppers, chili powder, adams powder, vinegar, hot sauces, and BBQ sauce.    Citrus fruit juices and citrus fruits, such as oranges, kilo, and limes.    Tomato-based foods, such as red sauce, chili, salsa, and pizza with red sauce.    Fried and fatty foods, such as donuts, french fries, potato chips, and high-fat dressings.    High-fat meats, such as hot dogs, rib eye steak, sausage, ham, and negrete.    High-fat dairy items, such as whole milk, butter, and cream cheese.  · Eat small meals often. Avoid eating large meals.  · Avoid drinking large amounts of liquid with your meals.  · Avoid eating meals during the 2-3 hours before bedtime.  · Avoid lying down right after you eat.  · Do not exercise right after you eat.   General Instructions   · Pay attention to any changes in your symptoms.  · Take over-the-counter and  prescription medicines only as told by your doctor. Do not take aspirin, ibuprofen, or other NSAIDs unless your doctor says it is okay.  · Do not use any tobacco products, including cigarettes, chewing tobacco, and e-cigarettes. If you need help quitting, ask your doctor.  · Wear loose clothes. Do not wear anything tight around your waist.  · Raise (elevate) the head of your bed about 6 inches (15 cm).  · Try to lower your stress. If you need help doing this, ask your doctor.  · If you are overweight, lose an amount of weight that is healthy for you. Ask your doctor about a safe weight loss goal.  · Keep all follow-up visits as told by your doctor. This is important.  GET HELP IF:  · You have new symptoms.  · You lose weight and you do not know why it is happening.  · You have trouble swallowing, or it hurts to swallow.  · You have wheezing or a cough that keeps happening.  · Your symptoms do not get better with treatment.  · You have a hoarse voice.  GET HELP RIGHT AWAY IF:  · You have pain in your arms, neck, jaw, teeth, or back.  · You feel sweaty, dizzy, or light-headed.  · You have chest pain or shortness of breath.  · You throw up (vomit) and your throw up looks like blood or coffee grounds.  · You pass out (faint).  · Your poop (stool) is bloody or black.  · You cannot swallow, drink, or eat.     This information is not intended to replace advice given to you by your health care provider. Make sure you discuss any questions you have with your health care provider.     Document Released: 06/05/2009 Document Revised: 09/07/2016 Document Reviewed: 04/13/2016  [employer] Interactive Patient Education ©2016 [employer]

## 2017-01-12 NOTE — MR AVS SNAPSHOT
Humera Swartz   1/12/2017 1:00 PM   Office Visit    Dept Phone:  [phone]   Encounter #:  [id number]    Provider:  DAVID Mazariegos   Department:  [facility] GASTROENTEROLOGY                Your Full Care Plan              Today's Medication Changes          These changes are accurate as of: 1/12/17  1:54 PM.  If you have any questions, ask your nurse or doctor.               New Medication(s)Ordered:     dexlansoprazole 60 MG capsule   Commonly known as:  DEXILANT   Take 1 capsule by mouth Daily for 30 days.   Started by:  DAVID Mazariegos         Stop taking medication(s)listed here:     fluticasone 50 MCG/ACT nasal spray   Commonly known as:  FLONASE   Stopped by:  DAVID Mazariegos           omeprazole 20 MG capsule   Commonly known as:  priLOSEC   Stopped by:  DAVID Mazariegos           phenazopyridine 95 MG tablet   Commonly known as:  PYRIDIUM   Stopped by:  DAVID Mazariegos           raNITIdine 150 MG tablet   Commonly known as:  ZANTAC   Stopped by:  DAVID Mazariegos                Where to Get Your Medications      These medications were sent to [facility]/pharmacy #[id number] - [address] - [phone]  - [phone] [address] [address]     Phone:  [phone]     dexlansoprazole 60 MG capsule                  Your Updated Medication List          This list is accurate as of: 1/12/17  1:54 PM.  Always use your most recent med list.                anastrozole 1 MG tablet   Commonly known as:  ARIMIDEX       busPIRone 5 MG tablet   Commonly known as:  BUSPAR   TAKE 1 TABLET(S) BY MOUTH 2 TIMES PER DAY       ciprofloxacin 500 MG tablet   Commonly known as:  CIPRO   Take 1 tablet by mouth 2 (Two) Times a Day.       clobetasol propionate 0.05 % shampoo   Commonly known as:  CLOBEX       dexlansoprazole 60 MG capsule   Commonly known as:  DEXILANT   Take 1 capsule by mouth Daily for 30 days.       nitrofurantoin  (macrocrystal-monohydrate) 100 MG capsule   Commonly known as:  MACROBID       sertraline 100 MG tablet   Commonly known as:  ZOLOFT   TAKE 1/2 TABLET BY MOUTH EVERY DAY               You Were Diagnosed With        Codes Comments    Epigastric pain    -  Primary ICD-10-CM: R10.13  ICD-9-CM: 789.06     Hiatal hernia     ICD-10-CM: K44.9  ICD-9-CM: 553.3       Instructions    Food Choices for Gastroesophageal Reflux Disease, Adult  When you have gastroesophageal reflux disease (GERD), the foods you eat and your eating habits are very important. Choosing the right foods can help ease your discomfort.   WHAT GUIDELINES DO I NEED TO FOLLOW?   · Choose fruits, vegetables, whole grains, and low-fat dairy products.    · Choose low-fat meat, fish, and poultry.  · Limit fats such as oils, salad dressings, butter, nuts, and avocado.    · Keep a food diary. This helps you identify foods that cause symptoms.    · Avoid foods that cause symptoms. These may be different for everyone.    · Eat small meals often instead of 3 large meals a day.    · Eat your meals slowly, in a place where you are relaxed.    · Limit fried foods.    · Cook foods using methods other than frying.    · Avoid drinking alcohol.    · Avoid drinking large amounts of liquids with your meals.    · Avoid bending over or lying down until 2-3 hours after eating.    WHAT FOODS ARE NOT RECOMMENDED?   These are some foods and drinks that may make your symptoms worse:  Vegetables  Tomatoes. Tomato juice. Tomato and spaghetti sauce. Chili peppers. Onion and garlic. Horseradish.  Fruits  Oranges, grapefruit, and lemon (fruit and juice).  Meats  High-fat meats, fish, and poultry. This includes hot dogs, ribs, ham, sausage, salami, and negrete.  Dairy  Whole milk and chocolate milk. Sour cream. Cream. Butter. Ice cream. Cream cheese.   Drinks  Coffee and tea. Bubbly (carbonated) drinks or energy drinks.  Condiments  Hot sauce. Barbecue sauce.   Sweets/Desserts  Chocolate  and cocoa. Donuts. Peppermint and spearmint.  Fats and Oils  High-fat foods. This includes French fries and potato chips.  Other  Vinegar. Strong spices. This includes black pepper, white pepper, red pepper, cayenne, adams powder, cloves, ginger, and chili powder.  The items listed above may not be a complete list of foods and drinks to avoid. Contact your dietitian for more information.     This information is not intended to replace advice given to you by your health care provider. Make sure you discuss any questions you have with your health care provider.     Document Released: 06/18/2013 Document Revised: 01/08/2016 Document Reviewed: 10/22/2014  [employer] Interactive Patient Education ©2016 [employer].    Gastroesophageal Reflux Disease, Adult  Normally, food travels down the esophagus and stays in the stomach to be digested. If a person has gastroesophageal reflux disease (GERD), food and stomach acid move back up into the esophagus. When this happens, the esophagus becomes sore and swollen (inflamed). Over time, GERD can make small holes (ulcers) in the lining of the esophagus.  HOME CARE  Diet   · Follow a diet as told by your doctor. You may need to avoid foods and drinks such as:    Coffee and tea (with or without caffeine).    Drinks that contain alcohol.    Energy drinks and sports drinks.    Carbonated drinks or sodas.    Chocolate and cocoa.    Peppermint and mint flavorings.    Garlic and onions.    Horseradish.    Spicy and acidic foods, such as peppers, chili powder, adams powder, vinegar, hot sauces, and BBQ sauce.    Citrus fruit juices and citrus fruits, such as oranges, kilo, and limes.    Tomato-based foods, such as red sauce, chili, salsa, and pizza with red sauce.    Fried and fatty foods, such as donuts, french fries, potato chips, and high-fat dressings.    High-fat meats, such as hot dogs, rib eye steak, sausage, ham, and negrete.    High-fat dairy items, such as whole milk, butter, and  cream cheese.  · Eat small meals often. Avoid eating large meals.  · Avoid drinking large amounts of liquid with your meals.  · Avoid eating meals during the 2-3 hours before bedtime.  · Avoid lying down right after you eat.  · Do not exercise right after you eat.   General Instructions   · Pay attention to any changes in your symptoms.  · Take over-the-counter and prescription medicines only as told by your doctor. Do not take aspirin, ibuprofen, or other NSAIDs unless your doctor says it is okay.  · Do not use any tobacco products, including cigarettes, chewing tobacco, and e-cigarettes. If you need help quitting, ask your doctor.  · Wear loose clothes. Do not wear anything tight around your waist.  · Raise (elevate) the head of your bed about 6 inches (15 cm).  · Try to lower your stress. If you need help doing this, ask your doctor.  · If you are overweight, lose an amount of weight that is healthy for you. Ask your doctor about a safe weight loss goal.  · Keep all follow-up visits as told by your doctor. This is important.  GET HELP IF:  · You have new symptoms.  · You lose weight and you do not know why it is happening.  · You have trouble swallowing, or it hurts to swallow.  · You have wheezing or a cough that keeps happening.  · Your symptoms do not get better with treatment.  · You have a hoarse voice.  GET HELP RIGHT AWAY IF:  · You have pain in your arms, neck, jaw, teeth, or back.  · You feel sweaty, dizzy, or light-headed.  · You have chest pain or shortness of breath.  · You throw up (vomit) and your throw up looks like blood or coffee grounds.  · You pass out (faint).  · Your poop (stool) is bloody or black.  · You cannot swallow, drink, or eat.     This information is not intended to replace advice given to you by your health care provider. Make sure you discuss any questions you have with your health care provider.     Document Released: 06/05/2009 Document Revised: 09/07/2016 Document Reviewed:  "2016  [employer] Interactive Patient Education © [employer].       Patient Instructions History      Upcoming Appointments     Visit Type Date Time Department    OFFICE VISIT 2017  1:00 PM [facility]  [facility]    OFFICE VISIT 3/1/2017  1:45 PM [facility]  [facility]      MyChart Signup     [facility] PillPack allows you to send messages to your doctor, view your test results, renew your prescriptions, schedule appointments, and more. To sign up, go to [facility] and click on the Sign Up Now link in the New User? box. Enter your PillPack Activation Code exactly as it appears below along with the last four digits of your Social Security Number and your Date of Birth () to complete the sign-up process. If you do not sign up before the expiration date, you must request a new code.    PillPack Activation Code: [email or web address]  Expires: 2017  4:48 PM    If you have questions, you can email [email or web address] or call [phone] to talk to our PillPack staff. Remember, PillPack is NOT to be used for urgent needs. For medical emergencies, dial 911.               Other Info from Your Visit           Your Appointments     Mar 01, 2017  1:45 PM CST   Office Visit with DAVID Mazariegos   [facility] (--)    [address] [place] [address] [address]  [facility] [address] [place] [id number] [phone]           Arrive 15 minutes prior to appointment.              Allergies     Erythromycin      Phenergan [Promethazine Hcl]      Promethazine      Propofol      COUGH/WHEEZE    Tetracyclines & Related        Reason for Visit     Abdominal Pain Right Lower Quadrant    Patel's Esophagus     Reoccuring Urinary Tract Infection           Vital Signs     Blood Pressure Pulse Height Weight Body Mass Index Smoking Status    125/71 (BP Location: Left arm, Patient Position: Sitting, Cuff Size: Adult) 75 66\" (167.6 cm) 178 lb (80.7 kg) 28.73 kg/m2 Former Smoker      Problems " and Diagnoses Noted     Abdominal pain, pit of stomach    -  Primary    Hiatal hernia

## 2017-01-30 ENCOUNTER — TELEPHONE (OUTPATIENT)
Dept: INTERNAL MEDICINE | Facility: CLINIC | Age: 62
End: 2017-01-30

## 2017-01-30 NOTE — TELEPHONE ENCOUNTER
Spoke with pt let her know urine was ok but if she is still having problems dr martinez recommends referral to dr collazo pt said she is starting to have pain again i let her know i was sending referral to dr collazo and they will call her with an appt....

## 2017-02-06 ENCOUNTER — TELEPHONE (OUTPATIENT)
Dept: INTERNAL MEDICINE | Facility: CLINIC | Age: 62
End: 2017-02-06

## 2017-04-18 RX ORDER — CLOBETASOL PROPIONATE 0.05 G/100ML
SHAMPOO TOPICAL
Qty: 30 G | Refills: 1 | Status: SHIPPED | OUTPATIENT
Start: 2017-04-18 | End: 2017-12-28 | Stop reason: RX

## 2017-04-18 RX ORDER — SERTRALINE HYDROCHLORIDE 100 MG/1
TABLET, FILM COATED ORAL
Qty: 30 TABLET | Refills: 1 | Status: SHIPPED | OUTPATIENT
Start: 2017-04-18 | End: 2017-08-13 | Stop reason: SDUPTHER

## 2017-04-20 RX ORDER — ANASTROZOLE 1 MG/1
TABLET ORAL
Qty: 30 TABLET | Refills: 3 | Status: SHIPPED | OUTPATIENT
Start: 2017-04-20 | End: 2017-09-02 | Stop reason: SDUPTHER

## 2017-06-17 ENCOUNTER — APPOINTMENT (OUTPATIENT)
Dept: GENERAL RADIOLOGY | Facility: HOSPITAL | Age: 62
End: 2017-06-17

## 2017-06-17 ENCOUNTER — HOSPITAL ENCOUNTER (EMERGENCY)
Facility: HOSPITAL | Age: 62
Discharge: HOME | End: 2017-06-17
Attending: EMERGENCY MEDICINE | Admitting: EMERGENCY MEDICINE

## 2017-06-17 VITALS
SYSTOLIC BLOOD PRESSURE: 163 MMHG | HEART RATE: 73 BPM | BODY MASS INDEX: 28.61 KG/M2 | WEIGHT: 178 LBS | RESPIRATION RATE: 16 BRPM | HEIGHT: 66 IN | DIASTOLIC BLOOD PRESSURE: 76 MMHG | OXYGEN SATURATION: 100 % | TEMPERATURE: 97.6 F

## 2017-06-17 DIAGNOSIS — I10 ESSENTIAL HYPERTENSION: ICD-10-CM

## 2017-06-17 DIAGNOSIS — J42 CHRONIC BRONCHITIS, UNSPECIFIED CHRONIC BRONCHITIS TYPE (HCC): ICD-10-CM

## 2017-06-17 DIAGNOSIS — N39.0 URINARY TRACT INFECTION, SITE UNSPECIFIED: Primary | ICD-10-CM

## 2017-06-17 LAB
ALBUMIN SERPL-MCNC: 3.8 G/DL (ref 3.4–4.8)
ALBUMIN/GLOB SERPL: 1.4 G/DL (ref 1.1–1.8)
ALP SERPL-CCNC: 112 U/L (ref 38–126)
ALT SERPL W P-5'-P-CCNC: 33 U/L (ref 9–52)
ANION GAP SERPL CALCULATED.3IONS-SCNC: 11 MMOL/L (ref 5–15)
AST SERPL-CCNC: 23 U/L (ref 14–36)
BACTERIA UR QL AUTO: ABNORMAL /HPF
BASOPHILS # BLD AUTO: 0.01 10*3/MM3 (ref 0–0.2)
BASOPHILS NFR BLD AUTO: 0.1 % (ref 0–2)
BILIRUB SERPL-MCNC: 0.5 MG/DL (ref 0.2–1.3)
BILIRUB UR QL STRIP: NEGATIVE
BUN BLD-MCNC: 23 MG/DL (ref 7–21)
BUN/CREAT SERPL: 29.5 (ref 7–25)
CALCIUM SPEC-SCNC: 9 MG/DL (ref 8.4–10.2)
CHLORIDE SERPL-SCNC: 104 MMOL/L (ref 95–110)
CLARITY UR: CLEAR
CO2 SERPL-SCNC: 26 MMOL/L (ref 22–31)
COLOR UR: YELLOW
CREAT BLD-MCNC: 0.78 MG/DL (ref 0.5–1)
D-DIMER, QUANTITATIVE (MAD,POW, STR): 360 NG/ML (FEU) (ref 0–470)
DEPRECATED RDW RBC AUTO: 43.1 FL (ref 36.4–46.3)
EOSINOPHIL # BLD AUTO: 0.01 10*3/MM3 (ref 0–0.7)
EOSINOPHIL NFR BLD AUTO: 0.1 % (ref 0–7)
ERYTHROCYTE [DISTWIDTH] IN BLOOD BY AUTOMATED COUNT: 13.5 % (ref 11.5–14.5)
FLUAV AG NPH QL: NEGATIVE
FLUBV AG NPH QL IA: NEGATIVE
GFR SERPL CREATININE-BSD FRML MDRD: 75 ML/MIN/1.73 (ref 45–104)
GLOBULIN UR ELPH-MCNC: 2.8 GM/DL (ref 2.3–3.5)
GLUCOSE BLD-MCNC: 102 MG/DL (ref 60–100)
GLUCOSE UR STRIP-MCNC: NEGATIVE MG/DL
HCT VFR BLD AUTO: 37.4 % (ref 35–45)
HGB BLD-MCNC: 12.6 G/DL (ref 12–15.5)
HGB UR QL STRIP.AUTO: ABNORMAL
HOLD SPECIMEN: NORMAL
HOLD SPECIMEN: NORMAL
HYALINE CASTS UR QL AUTO: ABNORMAL /LPF
IMM GRANULOCYTES # BLD: 0.07 10*3/MM3 (ref 0–0.02)
IMM GRANULOCYTES NFR BLD: 0.6 % (ref 0–0.5)
KETONES UR QL STRIP: NEGATIVE
LEUKOCYTE ESTERASE UR QL STRIP.AUTO: ABNORMAL
LYMPHOCYTES # BLD AUTO: 1.76 10*3/MM3 (ref 0.6–4.2)
LYMPHOCYTES NFR BLD AUTO: 15.4 % (ref 10–50)
MCH RBC QN AUTO: 29.6 PG (ref 26.5–34)
MCHC RBC AUTO-ENTMCNC: 33.7 G/DL (ref 31.4–36)
MCV RBC AUTO: 88 FL (ref 80–98)
MONOCYTES # BLD AUTO: 1.07 10*3/MM3 (ref 0–0.9)
MONOCYTES NFR BLD AUTO: 9.4 % (ref 0–12)
NEUTROPHILS # BLD AUTO: 8.49 10*3/MM3 (ref 2–8.6)
NEUTROPHILS NFR BLD AUTO: 74.4 % (ref 37–80)
NITRITE UR QL STRIP: NEGATIVE
NT-PROBNP SERPL-MCNC: 212 PG/ML (ref 0–900)
PH UR STRIP.AUTO: 6.5 [PH] (ref 5–9)
PLATELET # BLD AUTO: 217 10*3/MM3 (ref 150–450)
PMV BLD AUTO: 10.3 FL (ref 8–12)
POTASSIUM BLD-SCNC: 3.9 MMOL/L (ref 3.5–5.1)
PROT SERPL-MCNC: 6.6 G/DL (ref 6.3–8.6)
PROT UR QL STRIP: NEGATIVE
RBC # BLD AUTO: 4.25 10*6/MM3 (ref 3.77–5.16)
RBC # UR: ABNORMAL /HPF
REF LAB TEST METHOD: ABNORMAL
S PYO AG THROAT QL: NEGATIVE
SODIUM BLD-SCNC: 141 MMOL/L (ref 137–145)
SP GR UR STRIP: 1.02 (ref 1–1.03)
SQUAMOUS #/AREA URNS HPF: ABNORMAL /HPF
UROBILINOGEN UR QL STRIP: ABNORMAL
WBC NRBC COR # BLD: 11.41 10*3/MM3 (ref 3.2–9.8)
WBC UR QL AUTO: ABNORMAL /HPF
WHOLE BLOOD HOLD SPECIMEN: NORMAL
WHOLE BLOOD HOLD SPECIMEN: NORMAL

## 2017-06-17 PROCEDURE — 87086 URINE CULTURE/COLONY COUNT: CPT | Performed by: EMERGENCY MEDICINE

## 2017-06-17 PROCEDURE — 99284 EMERGENCY DEPT VISIT MOD MDM: CPT

## 2017-06-17 PROCEDURE — 87880 STREP A ASSAY W/OPTIC: CPT | Performed by: EMERGENCY MEDICINE

## 2017-06-17 PROCEDURE — 85025 COMPLETE CBC W/AUTO DIFF WBC: CPT | Performed by: EMERGENCY MEDICINE

## 2017-06-17 PROCEDURE — 80053 COMPREHEN METABOLIC PANEL: CPT | Performed by: EMERGENCY MEDICINE

## 2017-06-17 PROCEDURE — 25010000002 HYDRALAZINE PER 20 MG: Performed by: EMERGENCY MEDICINE

## 2017-06-17 PROCEDURE — 85379 FIBRIN DEGRADATION QUANT: CPT | Performed by: EMERGENCY MEDICINE

## 2017-06-17 PROCEDURE — 87804 INFLUENZA ASSAY W/OPTIC: CPT | Performed by: EMERGENCY MEDICINE

## 2017-06-17 PROCEDURE — 87081 CULTURE SCREEN ONLY: CPT | Performed by: EMERGENCY MEDICINE

## 2017-06-17 PROCEDURE — 71020 HC CHEST PA AND LATERAL: CPT

## 2017-06-17 PROCEDURE — 93005 ELECTROCARDIOGRAM TRACING: CPT | Performed by: EMERGENCY MEDICINE

## 2017-06-17 PROCEDURE — 96374 THER/PROPH/DIAG INJ IV PUSH: CPT

## 2017-06-17 PROCEDURE — 83880 ASSAY OF NATRIURETIC PEPTIDE: CPT | Performed by: EMERGENCY MEDICINE

## 2017-06-17 PROCEDURE — 81001 URINALYSIS AUTO W/SCOPE: CPT | Performed by: EMERGENCY MEDICINE

## 2017-06-17 PROCEDURE — 93010 ELECTROCARDIOGRAM REPORT: CPT | Performed by: INTERNAL MEDICINE

## 2017-06-17 RX ORDER — SODIUM CHLORIDE 0.9 % (FLUSH) 0.9 %
10 SYRINGE (ML) INJECTION AS NEEDED
Status: DISCONTINUED | OUTPATIENT
Start: 2017-06-17 | End: 2017-06-17 | Stop reason: HOSPADM

## 2017-06-17 RX ORDER — HYDRALAZINE HYDROCHLORIDE 20 MG/ML
20 INJECTION INTRAMUSCULAR; INTRAVENOUS ONCE
Status: COMPLETED | OUTPATIENT
Start: 2017-06-17 | End: 2017-06-17

## 2017-06-17 RX ORDER — METOPROLOL TARTRATE 50 MG/1
50 TABLET, FILM COATED ORAL 2 TIMES DAILY
Qty: 60 TABLET | Refills: 0 | Status: SHIPPED | OUTPATIENT
Start: 2017-06-17 | End: 2017-09-22 | Stop reason: ALTCHOICE

## 2017-06-17 RX ORDER — DEXLANSOPRAZOLE 60 MG/1
60 CAPSULE, DELAYED RELEASE ORAL DAILY
Refills: 5 | COMMUNITY
Start: 2017-03-16 | End: 2017-07-27 | Stop reason: SDUPTHER

## 2017-06-17 RX ORDER — CLONIDINE HYDROCHLORIDE 0.1 MG/1
0.1 TABLET ORAL ONCE
Status: COMPLETED | OUTPATIENT
Start: 2017-06-17 | End: 2017-06-17

## 2017-06-17 RX ORDER — CIPROFLOXACIN 500 MG/1
500 TABLET, FILM COATED ORAL 2 TIMES DAILY
Qty: 20 TABLET | Refills: 0 | Status: SHIPPED | OUTPATIENT
Start: 2017-06-17 | End: 2017-09-08

## 2017-06-17 RX ADMIN — LEVOFLOXACIN 750 MG: 500 TABLET, FILM COATED ORAL at 04:49

## 2017-06-17 RX ADMIN — CLONIDINE HYDROCHLORIDE 0.1 MG: 0.1 TABLET ORAL at 04:48

## 2017-06-17 RX ADMIN — HYDRALAZINE HYDROCHLORIDE 20 MG: 20 INJECTION INTRAMUSCULAR; INTRAVENOUS at 05:50

## 2017-06-17 NOTE — ED PROVIDER NOTES
Subjective   HPI Comments: Patient came complaining of sore throat for the last 3 weeks.  She had been to the [facility] urgent care and given antibiotic shots and steroids several times and is not getting any better she feels something in her throat and that she cannot breathe.  Her blood pressure on arrival was 215/98    Allergies to: Erythromycin Phenergan promethazine propofol and tetracycline    Former smoker no alcohol    Surgical history  colonoscopy esophagoscopy tubal ligation hysteroscopy laparoscopy breast surgery breast biopsy mastectomy    Medical history fibrocystic breast disease breast cancer hyperlipidemia anxiety depression diverticular disease of the colon GERD    Medication Zoloft BuSpar Cipro Macrobid dexilant      History provided by:  Patient      Review of Systems   Constitutional: Negative for activity change, appetite change, fatigue and fever.   HENT: Negative for congestion, facial swelling, mouth sores, nosebleeds, sore throat and trouble swallowing.    Eyes: Negative for discharge, redness and itching.   Respiratory: Negative for apnea, cough and wheezing.    Cardiovascular: Negative for chest pain and palpitations.   Gastrointestinal: Negative for blood in stool and nausea.   Endocrine: Negative for cold intolerance, heat intolerance, polydipsia, polyphagia and polyuria.   Genitourinary: Negative for difficulty urinating, dysuria, flank pain, frequency and hematuria.   Musculoskeletal: Negative for gait problem, joint swelling and neck pain.   Skin: Negative.  Negative for color change, pallor and rash.   Allergic/Immunologic: Negative for environmental allergies.   Neurological: Negative for dizziness, seizures, syncope, speech difficulty, light-headedness, numbness and headaches.   Hematological: Negative for adenopathy.   Psychiatric/Behavioral: Negative for agitation, behavioral problems, confusion and sleep disturbance. The patient is not nervous/anxious.        Past  Medical History:   Diagnosis Date   • Acquired equinus deformity of foot     ANKLE   • Anxiety    • Breast cancer    • Cancer     BREAST   • Depression    • Diverticular disease of colon    • Drug therapy    • Esophagitis    • Fibrocystic breast    • GERD (gastroesophageal reflux disease)    • History of bone density study 2012    NORMAL   • History of echocardiogram 2016    Normal LV systolic function.Ef of 55-60%.Grade 1 diastolic dysfunciton of the LV myocardium.Mild left atiral enlargement.No evidence of pericardial effusion   • History of mammogram 2011    one breast (Benign finding.)   • History of Papanicolaou smear of cervix 2011    NEGATIVE   • Hx of radiation therapy    • Hyperlipidemia    • Injury of head and neck    • Minor head injury    • Personal history of malignant neoplasm of breast    • Plantar fasciitis    • Primary fibromyalgia syndrome    • Solitary pulmonary nodule present on computed tomography of lung        Allergies   Allergen Reactions   • Erythromycin    • Phenergan [Promethazine Hcl]    • Promethazine    • Propofol      COUGH/WHEEZE   • Tetracyclines & Related        Past Surgical History:   Procedure Laterality Date   • BREAST SURGERY      Carcinoma of the right breast;Mastectomy   •  SECTION     • COLONOSCOPY  2016    Normal colon.No specimens collected   • DIAGNOSTIC LAPAROSCOPY  1977    (Amenorrhea, probable polycystic ovaries. Polycystic ovaries   • ESOPHAGOSCOPY / EGD  2016    Mildly severe esophagitis.Gastritis.Normal examined duodenum.Medium sized hiatus hernia   • EXCISION BREAST LESION W/ PREOP NEEDLE LOC  1987    Bilateral excision of breast masses. Bilateral breast masses; probable fibrocystic disease.   • HYSTEROSCOPY  2011    Exam under anesthesia, diagnostic hysterectomy with fractional dilation and curettage. Thickened endometrial stripe, on Tamoxifen therapy.   • INJECTION OF MEDICATION  2012    Kenalog  (1)    • MASTECTOMY     • OTHER SURGICAL HISTORY  02/12/2016    NEEDLE BIOPSY LYMPH NODES; Ultrasound directed core needle biopsy of the left axilla.   • TUBAL ABDOMINAL LIGATION         Family History   Problem Relation Age of Onset   • Diabetes Other    • Arthritis Other    • Breast cancer Maternal Aunt        Social History     Social History   • Marital status: Single     Spouse name: N/A   • Number of children: N/A   • Years of education: N/A     Social History Main Topics   • Smoking status: Former Smoker   • Smokeless tobacco: Never Used      Comment: Ceased Smoking 12 Years Prior   • Alcohol use No   • Drug use: No   • Sexual activity: Defer     Other Topics Concern   • None     Social History Narrative           Objective   Physical Exam   Constitutional: She is oriented to person, place, and time. She appears well-developed and well-nourished.   HENT:   Head: Normocephalic and atraumatic.   Nose: Nose normal.   Mouth/Throat: Oropharynx is clear and moist.   Eyes: Conjunctivae and EOM are normal. Pupils are equal, round, and reactive to light.   Neck: Normal range of motion. Neck supple.   Cardiovascular: Normal rate, regular rhythm, normal heart sounds and intact distal pulses.    Pulmonary/Chest: Effort normal and breath sounds normal.   Abdominal: Soft. Bowel sounds are normal.   Musculoskeletal: Normal range of motion.   Neurological: She is alert and oriented to person, place, and time.   Skin: Skin is warm and dry.   Psychiatric: She has a normal mood and affect. Her behavior is normal. Judgment and thought content normal.   Nursing note and vitals reviewed.      ECG 12 Lead    Date/Time: 6/17/2017 4:28 AM  Performed by: JONI HORNER  Authorized by: JONI HORNER   Interpreted by physician  Comparison: not compared with previous ECG   Rhythm: sinus bradycardia  Rate: normal  QRS axis: normal  Clinical impression: normal ECG               ED Course  ED Course      Labs Reviewed   URINALYSIS W/  CULTURE IF INDICATED - Abnormal; Notable for the following:        Result Value    Blood, UA Trace (*)     Leuk Esterase, UA Large (3+) (*)     All other components within normal limits   COMPREHENSIVE METABOLIC PANEL - Abnormal; Notable for the following:     Glucose 102 (*)     BUN 23 (*)     BUN/Creatinine Ratio 29.5 (*)     All other components within normal limits   CBC WITH AUTO DIFFERENTIAL - Abnormal; Notable for the following:     WBC 11.41 (*)     Immature Grans % 0.6 (*)     Monocytes, Absolute 1.07 (*)     Immature Grans, Absolute 0.07 (*)     All other components within normal limits   URINALYSIS, MICROSCOPIC ONLY - Abnormal; Notable for the following:     RBC, UA 0-2 (*)     WBC, UA 13-20 (*)     Squamous Epithelial Cells, UA 6-12 (*)     All other components within normal limits   INFLUENZA ANTIGEN - Normal   RAPID STREP A SCREEN - Normal   URINE CULTURE   BETA HEMOLYTIC STREP CULTURE, THROAT   RAINBOW DRAW    Narrative:     The following orders were created for panel order [place] Draw.  Procedure                               Abnormality         Status                     ---------                               -----------         ------                     Light Blue Top[[id number]]                                   In process                 Green Top (Gel)[[id number]]                                  In process                 Lavender Top[[id number]]                                     In process                 Gold Top - SST[[id number]]                                   In process                   Please view results for these tests on the individual orders.   BNP (IN-HOUSE)   D-DIMER, QUANTITATIVE   CBC AND DIFFERENTIAL    Narrative:     The following orders were created for panel order CBC & Differential.  Procedure                               Abnormality         Status                     ---------                               -----------         ------                     CBC Auto  Differential[[id number]]        Abnormal            Final result                 Please view results for these tests on the individual orders.   LIGHT BLUE TOP   GREEN TOP   LAVENDER TOP   GOLD TOP - SST        XR Chest 2 View   Final Result   No acute cardiopulmonary abnormality.      Electronically signed by:  Hosea Adkins MD  6/17/2017 3:35 AM   CDT Workstation: [id number]                    [facility]    Final diagnoses:   Urinary tract infection, site unspecified   Chronic bronchitis, unspecified chronic bronchitis type            Vince Pratt MD  06/17/17 0428       Vince Pratt MD  06/17/17 0503

## 2017-06-18 LAB — BACTERIA SPEC AEROBE CULT: NORMAL

## 2017-06-20 LAB — BACTERIA SPEC AEROBE CULT: NORMAL

## 2017-07-18 RX ORDER — DEXLANSOPRAZOLE 60 MG/1
CAPSULE, DELAYED RELEASE ORAL
Qty: 30 CAPSULE | Refills: 5 | OUTPATIENT
Start: 2017-07-18

## 2017-07-26 ENCOUNTER — TELEPHONE (OUTPATIENT)
Dept: GASTROENTEROLOGY | Facility: CLINIC | Age: 62
End: 2017-07-26

## 2017-07-26 NOTE — TELEPHONE ENCOUNTER
----- Message from Klarissa Beach sent at 7/26/2017  1:20 PM CDT -----  Would like refill on dexilant

## 2017-07-26 NOTE — TELEPHONE ENCOUNTER
Attempted to call patient in regards to her request for medication refill. Patient did not answer therefore I left a message on self identifying device. I notified patient of who I was and where I was calling from. I also notified patient that if she would like to continue medication she would need to be seen in office and I also left a callback number for patient to reach me at. Patient then later called right back to set up an appointment

## 2017-07-27 RX ORDER — DEXLANSOPRAZOLE 60 MG/1
60 CAPSULE, DELAYED RELEASE ORAL DAILY
Qty: 30 CAPSULE | Refills: 0 | Status: SHIPPED | OUTPATIENT
Start: 2017-07-27 | End: 2017-08-23 | Stop reason: SDUPTHER

## 2017-07-27 NOTE — TELEPHONE ENCOUNTER
----- Message from DAVID Mazariegos sent at 7/27/2017  8:21 AM CDT -----  Sure, will you send it to pharmacy of her choice?   ----- Message -----     From: Bhavna Rojas MA     Sent: 7/26/2017   4:22 PM       To: DAVID Mazariegos    Patient reschedule appointment and wants to know if you will refill dexilant to get her by until her appointment.

## 2017-08-14 RX ORDER — SERTRALINE HYDROCHLORIDE 100 MG/1
TABLET, FILM COATED ORAL
Qty: 7 TABLET | Refills: 0 | Status: SHIPPED | OUTPATIENT
Start: 2017-08-14 | End: 2018-09-10

## 2017-08-23 RX ORDER — DEXLANSOPRAZOLE 60 MG/1
60 CAPSULE, DELAYED RELEASE ORAL DAILY
Qty: 30 CAPSULE | Refills: 0 | Status: SHIPPED | OUTPATIENT
Start: 2017-08-23 | End: 2017-09-06 | Stop reason: SDUPTHER

## 2017-09-05 RX ORDER — ANASTROZOLE 1 MG/1
TABLET ORAL
Qty: 30 TABLET | Refills: 3 | Status: SHIPPED | OUTPATIENT
Start: 2017-09-05 | End: 2017-10-16

## 2017-09-05 NOTE — TELEPHONE ENCOUNTER
Patient needs refill. You haven't seen her since January and she has no follow ups. We can get her on the schedule for next available appointment.

## 2017-09-06 RX ORDER — DEXLANSOPRAZOLE 60 MG/1
60 CAPSULE, DELAYED RELEASE ORAL DAILY
Qty: 30 CAPSULE | Refills: 0 | Status: SHIPPED | OUTPATIENT
Start: 2017-09-06 | End: 2017-09-21 | Stop reason: SDUPTHER

## 2017-09-08 ENCOUNTER — OFFICE VISIT (OUTPATIENT)
Dept: SURGERY | Facility: CLINIC | Age: 62
End: 2017-09-08

## 2017-09-08 VITALS
SYSTOLIC BLOOD PRESSURE: 126 MMHG | DIASTOLIC BLOOD PRESSURE: 84 MMHG | WEIGHT: 169 LBS | HEIGHT: 66 IN | BODY MASS INDEX: 27.16 KG/M2

## 2017-09-08 DIAGNOSIS — Z85.3 HX OF BREAST CANCER: Primary | ICD-10-CM

## 2017-09-08 DIAGNOSIS — N63.0 BREAST MASS: ICD-10-CM

## 2017-09-08 PROCEDURE — 99212 OFFICE O/P EST SF 10 MIN: CPT | Performed by: SURGERY

## 2017-09-08 RX ORDER — IBUPROFEN/PSEUDOEPHEDRINE HCL 200MG-30MG
0.5 TABLET ORAL NIGHTLY
COMMUNITY
End: 2017-10-16

## 2017-09-08 NOTE — PROGRESS NOTES
Chief Complaint   Patient presents with   • Mass     Left Breast.        HPI  Hx of RIGHT breast cancer treated with mastectomy. Now with LEFT breast mass. Tender. Negative mammogram in October. Has been doing lots of lifting.      Past Medical History:   Diagnosis Date   • Acquired equinus deformity of foot     ANKLE   • Anxiety    • Breast cancer    • Cancer     BREAST   • Depression    • Diverticular disease of colon    • Drug therapy    • Esophagitis    • Fibrocystic breast    • GERD (gastroesophageal reflux disease)    • History of bone density study 2012    NORMAL   • History of echocardiogram 2016    Normal LV systolic function.Ef of 55-60%.Grade 1 diastolic dysfunciton of the LV myocardium.Mild left atiral enlargement.No evidence of pericardial effusion   • History of mammogram 2011    one breast (Benign finding.)   • History of Papanicolaou smear of cervix 2011    NEGATIVE   • Hx of radiation therapy    • Hyperlipidemia    • Injury of head and neck    • Minor head injury    • Personal history of malignant neoplasm of breast    • Plantar fasciitis    • Primary fibromyalgia syndrome    • Solitary pulmonary nodule present on computed tomography of lung        Past Surgical History:   Procedure Laterality Date   • BREAST SURGERY      Carcinoma of the right breast;Mastectomy   •  SECTION     • COLONOSCOPY  2016    Normal colon.No specimens collected   • DIAGNOSTIC LAPAROSCOPY  1977    (Amenorrhea, probable polycystic ovaries. Polycystic ovaries   • ESOPHAGOSCOPY / EGD  2016    Mildly severe esophagitis.Gastritis.Normal examined duodenum.Medium sized hiatus hernia   • EXCISION BREAST LESION W/ PREOP NEEDLE LOC  1987    Bilateral excision of breast masses. Bilateral breast masses; probable fibrocystic disease.   • HYSTEROSCOPY  2011    Exam under anesthesia, diagnostic hysterectomy with fractional dilation and curettage. Thickened endometrial stripe, on  Tamoxifen therapy.   • INJECTION OF MEDICATION  08/11/2012    Kenalog (1)    • MASTECTOMY     • OTHER SURGICAL HISTORY  02/12/2016    NEEDLE BIOPSY LYMPH NODES; Ultrasound directed core needle biopsy of the left axilla.   • TUBAL ABDOMINAL LIGATION           Current Outpatient Prescriptions:   •  anastrozole (ARIMIDEX) 1 MG tablet, TAKE 1 TABLET BY MOUTH DAILY, Disp: 30 tablet, Rfl: 3  •  BIOTIN PO, Take 1 tablet by mouth Daily., Disp: , Rfl:   •  Cholecalciferol (VITAMIN D-3 PO), Take 1 tablet by mouth Daily., Disp: , Rfl:   •  clobetasol propionate (CLOBEX) 0.05 % shampoo, Apply to affected area bid  Limit 4 week intervals do not apply to face or folds, Disp: 30 g, Rfl: 1  •  DEXILANT 60 MG capsule, Take 1 capsule by mouth Daily., Disp: 30 capsule, Rfl: 0  •  Melatonin 3 MG tablet dispersible, Take 0.5 tablets by mouth Every Night., Disp: , Rfl:   •  nitrofurantoin, macrocrystal-monohydrate, (MACROBID) 100 MG capsule, Take 100 mg by mouth 2 (Two) Times a Day., Disp: , Rfl: 0  •  Probiotic Product (PROBIOTIC DAILY PO), Take 1 capsule by mouth Daily., Disp: , Rfl:   •  sertraline (ZOLOFT) 100 MG tablet, TAKE 1/2 TABLET BY MOUTH EVERY DAY, Disp: 7 tablet, Rfl: 0  •  busPIRone (BUSPAR) 5 MG tablet, TAKE 1 TABLET(S) BY MOUTH 2 TIMES PER DAY, Disp: 30 tablet, Rfl: 0  •  metoprolol tartrate (LOPRESSOR) 50 MG tablet, Take 1 tablet by mouth 2 (Two) Times a Day., Disp: 60 tablet, Rfl: 0    Allergies   Allergen Reactions   • Erythromycin    • Phenergan [Promethazine Hcl]    • Promethazine    • Propofol      COUGH/WHEEZE   • Tetracyclines & Related        Family History   Problem Relation Age of Onset   • Diabetes Other    • Arthritis Other    • Breast cancer Maternal Aunt        Social History     Social History   • Marital status: Single     Spouse name: N/A   • Number of children: N/A   • Years of education: N/A     Occupational History   • Not on file.     Social History Main Topics   • Smoking status: Former Smoker   •  Smokeless tobacco: Never Used      Comment: Ceased Smoking 12 Years Prior   • Alcohol use No   • Drug use: No   • Sexual activity: Defer     Other Topics Concern   • Not on file     Social History Narrative       Review of Systems   Gastrointestinal: Negative for abdominal pain.   Musculoskeletal: Positive for back pain.       Physical Exam   Pulmonary/Chest:             ASSESSMENT    Humera was seen today for mass.    Diagnoses and all orders for this visit:    Hx of breast cancer    Breast mass  Comments:  LEFT side- likely benign  Orders:  -     US Breast Left Complete; Future        PLAN    1. Recheck after above          This document has been electronically signed by Dirk Leigh MD on September 8, 2017 9:52 AM

## 2017-09-15 ENCOUNTER — OFFICE VISIT (OUTPATIENT)
Dept: SURGERY | Facility: CLINIC | Age: 62
End: 2017-09-15

## 2017-09-15 VITALS
SYSTOLIC BLOOD PRESSURE: 140 MMHG | WEIGHT: 180 LBS | HEIGHT: 66 IN | DIASTOLIC BLOOD PRESSURE: 80 MMHG | BODY MASS INDEX: 28.93 KG/M2

## 2017-09-15 DIAGNOSIS — Z85.3 HX: BREAST CANCER: Primary | ICD-10-CM

## 2017-09-15 PROCEDURE — 99213 OFFICE O/P EST LOW 20 MIN: CPT | Performed by: SURGERY

## 2017-09-15 NOTE — PROGRESS NOTES
Chief Complaint   Patient presents with   • Follow-up     Recheck left breast and us.        HPI  Study Result   Procedure: Ultrasound left breast     Reason for exam: Nodularity in the subareolar and left breast  1:00 position.     FINDINGS: Ultrasound was performed of the left breast in region  of nodular concerns in the subareolar region at 1:00 position  left breast. Ultrasound of the left breast in the 1:00 position 8  cm from the nipple reveals normal breast parenchyma with no  suspicious mass or cyst identified. Ultrasound of the left breast  subareolar region reveals small fluid-filled benign ducts. No  suspicious mass or cyst identified.     IMPRESSION:  1.  Left breast subareolar benign fluid-filled ducts are seen.  2.  Otherwise negative ultrasound of the left breast.     Electronically signed by:  Juanpablo Mansfield MD  9/12/2017 12:02 PM CDT  Workstation: [id number]       ( I have personally reviewed the breast imaging and concur with the findings of the radiologist- BiRADS 2)            Current Outpatient Prescriptions:   •  anastrozole (ARIMIDEX) 1 MG tablet, TAKE 1 TABLET BY MOUTH DAILY, Disp: 30 tablet, Rfl: 3  •  BIOTIN PO, Take 1 tablet by mouth Daily., Disp: , Rfl:   •  Cholecalciferol (VITAMIN D-3 PO), Take 1 tablet by mouth Daily., Disp: , Rfl:   •  clobetasol propionate (CLOBEX) 0.05 % shampoo, Apply to affected area bid  Limit 4 week intervals do not apply to face or folds, Disp: 30 g, Rfl: 1  •  DEXILANT 60 MG capsule, Take 1 capsule by mouth Daily., Disp: 30 capsule, Rfl: 0  •  Melatonin 3 MG tablet dispersible, Take 0.5 tablets by mouth Every Night., Disp: , Rfl:   •  nitrofurantoin, macrocrystal-monohydrate, (MACROBID) 100 MG capsule, Take 100 mg by mouth 2 (Two) Times a Day., Disp: , Rfl: 0  •  Probiotic Product (PROBIOTIC DAILY PO), Take 1 capsule by mouth Daily., Disp: , Rfl:   •  sertraline (ZOLOFT) 100 MG tablet, TAKE 1/2 TABLET BY MOUTH EVERY DAY, Disp: 7 tablet, Rfl: 0  •  busPIRone (BUSPAR)  5 MG tablet, TAKE 1 TABLET(S) BY MOUTH 2 TIMES PER DAY, Disp: 30 tablet, Rfl: 0  •  metoprolol tartrate (LOPRESSOR) 50 MG tablet, Take 1 tablet by mouth 2 (Two) Times a Day., Disp: 60 tablet, Rfl: 0    Allergies   Allergen Reactions   • Erythromycin    • Phenergan [Promethazine Hcl]    • Promethazine    • Propofol      COUGH/WHEEZE   • Tetracyclines & Related        Review of Systems  Nothing to add- still feels nodularity  Physical Exam   Pulmonary/Chest:             ASSESSMENT    Humera was seen today for follow-up.    Diagnoses and all orders for this visit:    HX: breast cancer      PLAN    1. Recheck in 4 months          This document has been electronically signed by Dirk Leigh MD on September 15, 2017 3:02 PM

## 2017-09-20 DIAGNOSIS — C50.419 MALIGNANT NEOPLASM OF UPPER-OUTER QUADRANT OF FEMALE BREAST, UNSPECIFIED LATERALITY: Primary | ICD-10-CM

## 2017-09-21 RX ORDER — DEXLANSOPRAZOLE 60 MG/1
60 CAPSULE, DELAYED RELEASE ORAL DAILY
Qty: 30 CAPSULE | Refills: 0 | Status: SHIPPED | OUTPATIENT
Start: 2017-09-21 | End: 2017-11-24 | Stop reason: SDUPTHER

## 2017-09-22 ENCOUNTER — LAB (OUTPATIENT)
Dept: ONCOLOGY | Facility: HOSPITAL | Age: 62
End: 2017-09-22

## 2017-09-22 ENCOUNTER — OFFICE VISIT (OUTPATIENT)
Dept: ONCOLOGY | Facility: CLINIC | Age: 62
End: 2017-09-22

## 2017-09-22 VITALS
HEART RATE: 73 BPM | TEMPERATURE: 97.9 F | RESPIRATION RATE: 18 BRPM | WEIGHT: 174 LBS | BODY MASS INDEX: 28.08 KG/M2 | DIASTOLIC BLOOD PRESSURE: 88 MMHG | SYSTOLIC BLOOD PRESSURE: 163 MMHG

## 2017-09-22 DIAGNOSIS — Z13.820 ENCOUNTER FOR SCREENING FOR OSTEOPOROSIS: ICD-10-CM

## 2017-09-22 DIAGNOSIS — Z85.3 HX: BREAST CANCER: Primary | ICD-10-CM

## 2017-09-22 DIAGNOSIS — C50.419 MALIGNANT NEOPLASM OF UPPER-OUTER QUADRANT OF FEMALE BREAST, UNSPECIFIED LATERALITY: ICD-10-CM

## 2017-09-22 LAB
ALBUMIN SERPL-MCNC: 4.3 G/DL (ref 3.4–4.8)
ALBUMIN/GLOB SERPL: 1.4 G/DL (ref 1.1–1.8)
ALP SERPL-CCNC: 118 U/L (ref 38–126)
ALT SERPL W P-5'-P-CCNC: 30 U/L (ref 9–52)
ANION GAP SERPL CALCULATED.3IONS-SCNC: 13 MMOL/L (ref 5–15)
AST SERPL-CCNC: 24 U/L (ref 14–36)
BASOPHILS # BLD AUTO: 0.02 10*3/MM3 (ref 0–0.2)
BASOPHILS NFR BLD AUTO: 0.4 % (ref 0–2)
BILIRUB SERPL-MCNC: 0.9 MG/DL (ref 0.2–1.3)
BUN BLD-MCNC: 17 MG/DL (ref 7–21)
BUN/CREAT SERPL: 24.3 (ref 7–25)
CALCIUM SPEC-SCNC: 9.9 MG/DL (ref 8.4–10.2)
CHLORIDE SERPL-SCNC: 104 MMOL/L (ref 95–110)
CO2 SERPL-SCNC: 24 MMOL/L (ref 22–31)
CREAT BLD-MCNC: 0.7 MG/DL (ref 0.5–1)
DEPRECATED RDW RBC AUTO: 42.5 FL (ref 36.4–46.3)
EOSINOPHIL # BLD AUTO: 0.14 10*3/MM3 (ref 0–0.7)
EOSINOPHIL NFR BLD AUTO: 2.5 % (ref 0–7)
ERYTHROCYTE [DISTWIDTH] IN BLOOD BY AUTOMATED COUNT: 13.5 % (ref 11.5–14.5)
GFR SERPL CREATININE-BSD FRML MDRD: 85 ML/MIN/1.73 (ref 60–104)
GLOBULIN UR ELPH-MCNC: 3.1 GM/DL (ref 2.3–3.5)
GLUCOSE BLD-MCNC: 107 MG/DL (ref 60–100)
HCT VFR BLD AUTO: 40.5 % (ref 35–45)
HGB BLD-MCNC: 13.7 G/DL (ref 12–15.5)
IMM GRANULOCYTES # BLD: 0.01 10*3/MM3 (ref 0–0.02)
IMM GRANULOCYTES NFR BLD: 0.2 % (ref 0–0.5)
LYMPHOCYTES # BLD AUTO: 1.65 10*3/MM3 (ref 0.6–4.2)
LYMPHOCYTES NFR BLD AUTO: 29.2 % (ref 10–50)
MCH RBC QN AUTO: 29.4 PG (ref 26.5–34)
MCHC RBC AUTO-ENTMCNC: 33.8 G/DL (ref 31.4–36)
MCV RBC AUTO: 86.9 FL (ref 80–98)
MONOCYTES # BLD AUTO: 0.34 10*3/MM3 (ref 0–0.9)
MONOCYTES NFR BLD AUTO: 6 % (ref 0–12)
NEUTROPHILS # BLD AUTO: 3.49 10*3/MM3 (ref 2–8.6)
NEUTROPHILS NFR BLD AUTO: 61.7 % (ref 37–80)
PLATELET # BLD AUTO: 192 10*3/MM3 (ref 150–450)
PMV BLD AUTO: 10.1 FL (ref 8–12)
POTASSIUM BLD-SCNC: 4 MMOL/L (ref 3.5–5.1)
PROT SERPL-MCNC: 7.4 G/DL (ref 6.3–8.6)
RBC # BLD AUTO: 4.66 10*6/MM3 (ref 3.77–5.16)
SODIUM BLD-SCNC: 141 MMOL/L (ref 137–145)
WBC NRBC COR # BLD: 5.65 10*3/MM3 (ref 3.2–9.8)

## 2017-09-22 PROCEDURE — G0463 HOSPITAL OUTPT CLINIC VISIT: HCPCS | Performed by: INTERNAL MEDICINE

## 2017-09-22 PROCEDURE — 85025 COMPLETE CBC W/AUTO DIFF WBC: CPT

## 2017-09-22 PROCEDURE — 80053 COMPREHEN METABOLIC PANEL: CPT

## 2017-09-22 PROCEDURE — 99214 OFFICE O/P EST MOD 30 MIN: CPT | Performed by: INTERNAL MEDICINE

## 2017-09-22 RX ORDER — ANASTROZOLE 1 MG/1
1 TABLET ORAL DAILY
Qty: 30 TABLET | Refills: 3 | Status: SHIPPED | OUTPATIENT
Start: 2017-09-22 | End: 2018-02-19

## 2017-09-22 NOTE — PROGRESS NOTES
DATE OF VISIT: 9/22/2017    REASON FOR VISIT:  History of right breast cancer    HISTORY OF PRESENT ILLNESS:    62-year-old female with a past medical history significant for right-sided breast cancer, stage III diagnosed in 2008, status post mastectomy followed by chemotherapy and radiation.  Currently on Arimidex since January 2013 is here for follow-up visit today.  Denies any abnormal swollen and anywhere in the body.  Complains of generalized bone pain.  Complains of chronic abdominal discomfort in right lower quadrant radiating to back ongoing for last few months.  Denies any blood in the stool or urine.  Denies any fever or chills or night sweats.    PAST MEDICAL HISTORY:    Past Medical History:   Diagnosis Date   • Acquired equinus deformity of foot     ANKLE   • Anxiety    • Breast cancer    • Cancer     BREAST   • Depression    • Diverticular disease of colon    • Drug therapy    • Esophagitis    • Fibrocystic breast    • GERD (gastroesophageal reflux disease)    • History of bone density study 01/01/2012    NORMAL   • History of echocardiogram 07/11/2016    Normal LV systolic function.Ef of 55-60%.Grade 1 diastolic dysfunciton of the LV myocardium.Mild left atiral enlargement.No evidence of pericardial effusion   • History of mammogram 07/18/2011    one breast (Benign finding.)   • History of Papanicolaou smear of cervix 06/30/2011    NEGATIVE   • Hx of radiation therapy    • Hyperlipidemia    • Injury of head and neck    • Minor head injury    • Personal history of malignant neoplasm of breast    • Plantar fasciitis    • Primary fibromyalgia syndrome    • Solitary pulmonary nodule present on computed tomography of lung        SOCIAL HISTORY:    Social History   Substance Use Topics   • Smoking status: Former Smoker   • Smokeless tobacco: Never Used      Comment: Ceased Smoking 12 Years Prior   • Alcohol use No       Surgical History :  Past Surgical History:   Procedure Laterality Date   • BREAST  SURGERY      Carcinoma of the right breast;Mastectomy   •  SECTION     • COLONOSCOPY  2016    Normal colon.No specimens collected   • DIAGNOSTIC LAPAROSCOPY  1977    (Amenorrhea, probable polycystic ovaries. Polycystic ovaries   • ESOPHAGOSCOPY / EGD  2016    Mildly severe esophagitis.Gastritis.Normal examined duodenum.Medium sized hiatus hernia   • EXCISION BREAST LESION W/ PREOP NEEDLE LOC  1987    Bilateral excision of breast masses. Bilateral breast masses; probable fibrocystic disease.   • HYSTEROSCOPY  2011    Exam under anesthesia, diagnostic hysterectomy with fractional dilation and curettage. Thickened endometrial stripe, on Tamoxifen therapy.   • MASTECTOMY     • OTHER SURGICAL HISTORY  2016    NEEDLE BIOPSY LYMPH NODES; Ultrasound directed core needle biopsy of the left axilla.   • TUBAL ABDOMINAL LIGATION         ALLERGIES:    Allergies   Allergen Reactions   • Erythromycin    • Phenergan [Promethazine Hcl]    • Promethazine    • Propofol      COUGH/WHEEZE   • Tetracyclines & Related        REVIEW OF SYSTEMS:      CONSTITUTIONAL:  No fever, chills, or night sweats.     HEENT:  No epistaxis, mouth sores, or difficulty swallowing.    RESPIRATORY:  No new shortness of breath or cough at present.    CARDIOVASCULAR:  No chest pain or palpitations.    GASTROINTESTINAL: Complains of chronic abdominal pain in right lower quadrant radiating to back.  No  nausea, vomiting, or blood in the stool.    GENITOURINARY:  No dysuria or hematuria.    MUSCULOSKELETAL: Complains of generalized body pain and bone pain.    NEUROLOGICAL:  No tingling or numbness. No new headache or dizziness.     LYMPHATICS:  Denies any abnormal swollen and anywhere in the body.    SKIN:  Denies any new skin rash.        PHYSICAL EXAMINATION:      VITAL SIGNS:  /88  Pulse 73  Temp 97.9 °F (36.6 °C)  Resp 18  Wt 174 lb (78.9 kg)  BMI 28.08 kg/m2    GENERAL:  Not in any distress.    HEENT:   Normocephalic, Atraumatic.Mild Conjunctival pallor. No icterus. Extraocular Movements Intact. No Facial Asymmetry noted.    NECK:  No adenopathy. No JVD.    RESPIRATORY:  Fair air entry bilateral. No rhonchi or wheezing.    CARDIOVASCULAR:  S1, S2. Regular rate and rhythm. No murmur or gallop appreciated.    ABDOMEN:  Soft, obese, nontender. Bowel sounds present in all four quadrants.  No organomegaly appreciated.    EXTREMITIES:  No edema.No Calf Tenderness.    NEUROLOGIC:  Alert, awake and oriented ×3.  No  Motor or sensory deficit appreciated. Cranial Nerves 2-12 grossly intact.        DIAGNOSTIC DATA:    Glucose   Date Value Ref Range Status   09/22/2017 107 (H) 60 - 100 mg/dL Final     Sodium   Date Value Ref Range Status   09/22/2017 141 137 - 145 mmol/L Final     Potassium   Date Value Ref Range Status   09/22/2017 4.0 3.5 - 5.1 mmol/L Final     CO2   Date Value Ref Range Status   09/22/2017 24.0 22.0 - 31.0 mmol/L Final     Chloride   Date Value Ref Range Status   09/22/2017 104 95 - 110 mmol/L Final     Anion Gap   Date Value Ref Range Status   09/22/2017 13.0 5.0 - 15.0 mmol/L Final     Creatinine   Date Value Ref Range Status   09/22/2017 0.70 0.50 - 1.00 mg/dL Final     BUN   Date Value Ref Range Status   09/22/2017 17 7 - 21 mg/dL Final     BUN/Creatinine Ratio   Date Value Ref Range Status   09/22/2017 24.3 7.0 - 25.0 Final     Calcium   Date Value Ref Range Status   09/22/2017 9.9 8.4 - 10.2 mg/dL Final     eGFR Non  Amer   Date Value Ref Range Status   09/22/2017 85 >60 mL/min/1.73 Final     Alkaline Phosphatase   Date Value Ref Range Status   09/22/2017 118 38 - 126 U/L Final     Total Protein   Date Value Ref Range Status   09/22/2017 7.4 6.3 - 8.6 g/dL Final     ALT (SGPT)   Date Value Ref Range Status   09/22/2017 30 9 - 52 U/L Final     AST (SGOT)   Date Value Ref Range Status   09/22/2017 24 14 - 36 U/L Final     Total Bilirubin   Date Value Ref Range Status   09/22/2017 0.9 0.2 - 1.3  mg/dL Final     Albumin   Date Value Ref Range Status   09/22/2017 4.30 3.40 - 4.80 g/dL Final     Globulin   Date Value Ref Range Status   09/22/2017 3.1 2.3 - 3.5 gm/dL Final     A/G Ratio   Date Value Ref Range Status   09/22/2017 1.4 1.1 - 1.8 g/dL Final     Lab Results   Component Value Date    WBC 5.65 09/22/2017    HGB 13.7 09/22/2017    HCT 40.5 09/22/2017    MCV 86.9 09/22/2017     09/22/2017     Lab Results   Component Value Date    NEUTROABS 3.49 09/22/2017    IRON 42 11/23/2016    TIBC 338 11/23/2016    LABIRON 12.4 (L) 11/23/2016     Lab Results   Component Value Date    LABCA2 22.9 01/10/2017            RADIOLOGY DATA :  Ultrasound of left breast done on September 12, 2017 showed:  IMPRESSION:  1.  Left breast subareolar benign fluid-filled ducts are seen.  2.  Otherwise negative ultrasound of the left breast.      Unilateral screening mammogram of left breast done on March 10, 2017 showed:  IMPRESSION:  CONCLUSION:    No mammographic evidence of malignancy.  In the absence of  suspicious clinical or physical findings, routine follow-up  mammography is recommended in one year.     BIRADS Category 2: Benign findings      DEXA scan done in September 2015 showed:  FINDINGS-      L1-L4 bone mineral density (BMD)-     1.131 grams per square centimeter    0.8 standard deviations (T score) above the mean compared to a young  normal.     T score is 0.8.      Mean of bilateral femoral necks bone mineral density-              0.985 grams per square centimeter   1.2 standard deviations (T score) above the mean compared to a young  normal.     T score is 1.2.      CONCLUSION-      1.  Bone mineral density measurements as above.  2.  Lumbar spine-  Normal   3.  Femoral necks-  Normal           ASSESSMENT AND PLAN:       1.  History of infiltrating ductal carcinoma of right breast, stage III, T2 N2, ER positive ER positive HER-2/ary negative by fish diagnosed on March 10, 2017.  Status post mastectomy on  right side with lymph node dissection.  Patient received adjuvant chemotherapy in form of Adriamycin and Cytoxan followed by Taxol.  Subsequently patient received adjuvant radiation therapy .  Patient initially received tamoxifen from 2008 until January 2013.  After that in view of 4 lymph node being positive she was changed over to Arimidex in January 2013 plan is to continue it for 5 years until January 2018.  Mammogram of left breast done in March 2017 was within normal limit and ultrasound of left breast and in September 2017 was negative for malignancy.  Last DEXA scan was done in September 2015.  Patient remains on calcium and vitamin D.  We'll get a DEXA scan done prior to next clinic visit in 4 months.  Continue with Arimidex until end of January to finish her 10 years of hormonal therapy which was discussed with patient.  See her back in beginning of February with repeat CBC, CMP and DEXA scan prior to that.    2.  Chronic abdominal pain: Patient is being followed by [facility] for same.  Recommend following with gastroenterology.    3.  Hypertension    4.  Health Maintenance: Patient does not smoke.  Her last colonoscopy was done in 2016.  Last mammogram done in March 2017.  Remains full code    5.  Prescriptions: Patient has enough prescription for Arimidex at this point.    Jim Nina MD  9/22/2017  10:28 AM        EMR Dragon/Transcription disclaimer:   Much of this encounter note is an electronic transcription/translation of spoken language to printed text. The electronic translation of spoken language may permit erroneous, or at times, nonsensical words or phrases to be inadvertently transcribed; Although I have reviewed the note for such errors, some may still exist.

## 2017-09-29 ENCOUNTER — OFFICE VISIT (OUTPATIENT)
Dept: OBSTETRICS AND GYNECOLOGY | Facility: CLINIC | Age: 62
End: 2017-09-29

## 2017-09-29 VITALS
WEIGHT: 177 LBS | HEIGHT: 66 IN | SYSTOLIC BLOOD PRESSURE: 142 MMHG | DIASTOLIC BLOOD PRESSURE: 80 MMHG | BODY MASS INDEX: 28.45 KG/M2

## 2017-09-29 DIAGNOSIS — R10.31 RLQ ABDOMINAL PAIN: Primary | ICD-10-CM

## 2017-09-29 PROCEDURE — 99213 OFFICE O/P EST LOW 20 MIN: CPT | Performed by: OBSTETRICS & GYNECOLOGY

## 2017-09-29 NOTE — PROGRESS NOTES
No chief complaint on file.    Humera Swartz is a 62 y.o. year old .  No LMP recorded. Patient is not currently having periods (Reason: Chemotherapy/radiation).  She presents with a chief complaint of   having right lower quadrant pain.  It's been present for more than a year.  It's constant but it does wax and wane.  Nothing seems to make it better, and nothing seems to make it worse.  There is no change in the pain with bowel movements, no change the pain with urination, and no change the pain with eating.  Patient does have a known history of cervical and uterine prolapse and she has bladder polyps.  There is no fever or chills with the pain.  The pain radiates to her back.    Past Medical History:   Diagnosis Date   • Acquired equinus deformity of foot     ANKLE   • Anxiety    • Breast cancer    • Cancer     BREAST   • Depression    • Diverticular disease of colon    • Drug therapy    • Esophagitis    • Fibrocystic breast    • GERD (gastroesophageal reflux disease)    • History of bone density study 2012    NORMAL   • History of echocardiogram 2016    Normal LV systolic function.Ef of 55-60%.Grade 1 diastolic dysfunciton of the LV myocardium.Mild left atiral enlargement.No evidence of pericardial effusion   • History of mammogram 2011    one breast (Benign finding.)   • History of Papanicolaou smear of cervix 2011    NEGATIVE   • Hx of radiation therapy    • Hyperlipidemia    • Injury of head and neck    • Minor head injury    • Personal history of malignant neoplasm of breast    • Plantar fasciitis    • Primary fibromyalgia syndrome    • Solitary pulmonary nodule present on computed tomography of lung      Past Surgical History:   Procedure Laterality Date   • BREAST SURGERY      Carcinoma of the right breast;Mastectomy   •  SECTION     • COLONOSCOPY  2016    Normal colon.No specimens collected   • DIAGNOSTIC LAPAROSCOPY  1977    (Amenorrhea, probable  polycystic ovaries. Polycystic ovaries   • ESOPHAGOSCOPY / EGD  02/22/2016    Mildly severe esophagitis.Gastritis.Normal examined duodenum.Medium sized hiatus hernia   • EXCISION BREAST LESION W/ PREOP NEEDLE LOC  06/17/1987    Bilateral excision of breast masses. Bilateral breast masses; probable fibrocystic disease.   • HYSTEROSCOPY  12/13/2011    Exam under anesthesia, diagnostic hysterectomy with fractional dilation and curettage. Thickened endometrial stripe, on Tamoxifen therapy.   • MASTECTOMY     • OTHER SURGICAL HISTORY  02/12/2016    NEEDLE BIOPSY LYMPH NODES; Ultrasound directed core needle biopsy of the left axilla.   • TUBAL ABDOMINAL LIGATION         Current Outpatient Prescriptions:   •  anastrozole (ARIMIDEX) 1 MG tablet, TAKE 1 TABLET BY MOUTH DAILY, Disp: 30 tablet, Rfl: 3  •  anastrozole (ARIMIDEX) 1 MG tablet, Take 1 tablet by mouth Daily., Disp: 30 tablet, Rfl: 3  •  busPIRone (BUSPAR) 5 MG tablet, TAKE 1 TABLET(S) BY MOUTH 2 TIMES PER DAY, Disp: 30 tablet, Rfl: 0  •  Cholecalciferol (VITAMIN D-3 PO), Take 1 tablet by mouth Daily., Disp: , Rfl:   •  clobetasol propionate (CLOBEX) 0.05 % shampoo, Apply to affected area bid  Limit 4 week intervals do not apply to face or folds, Disp: 30 g, Rfl: 1  •  DEXILANT 60 MG capsule, Take 1 capsule by mouth Daily., Disp: 30 capsule, Rfl: 0  •  Melatonin 3 MG tablet dispersible, Take 0.5 tablets by mouth Every Night., Disp: , Rfl:   •  nitrofurantoin, macrocrystal-monohydrate, (MACROBID) 100 MG capsule, Take 100 mg by mouth 2 (Two) Times a Day., Disp: , Rfl: 0  •  Probiotic Product (PROBIOTIC DAILY PO), Take 1 capsule by mouth Daily., Disp: , Rfl:   •  sertraline (ZOLOFT) 100 MG tablet, TAKE 1/2 TABLET BY MOUTH EVERY DAY, Disp: 7 tablet, Rfl: 0  •  BIOTIN PO, Take 1 tablet by mouth Daily., Disp: , Rfl:   Allergies   Allergen Reactions   • Erythromycin    • Phenergan [Promethazine Hcl]    • Promethazine    • Propofol      COUGH/WHEEZE   • Tetracyclines &  "Related      Smoking status: Former Smoker                                                              Packs/day: 0.00      Years: 0.00      Smokeless status: Never Used                      Comment: Ceased Smoking 12 Years Prior    Review of Systems   Constitutional: Negative for activity change, appetite change, chills and fever.   Gastrointestinal: Positive for abdominal pain. Negative for abdominal distention.   Genitourinary: Negative for difficulty urinating, dysuria, flank pain, genital sores, pelvic pain, vaginal bleeding, vaginal discharge and vaginal pain.        /80  Ht 66\" (167.6 cm)  Wt 177 lb (80.3 kg)  BMI 28.57 kg/m2    General:  well developed; well nourished  no acute distress   Thyroid: not examined   Lungs:  breathing is unlabored   Heart:  Not performed.   Breasts:  Not performed.   Abdomen: Soft, tender in the right lower quadrant, no guarding, and no rebound.  No masses were appreciated.   Pelvis: Clinical staff was present for exam  External genitalia:  normal appearance of the external genitalia including Bartholin's and [place]'s glands.  :  urethral meatus normal;  Vaginal:  normal pink mucosa without prolapse or lesions.  Cervix:  normal appearance. Prolapse of the cervix to the introitus is noted  Uterus:  normal size, shape and consistency.  Adnexa:  normal bimanual exam of the adnexa.  Rectal:  digital rectal exam not performed; anus visually normal appearing.  Uterine GRADE 2     Lab Review   No data reviewed      Imaging   No data reviewed    Assessment      Diagnosis Plan   1. RLQ abdominal pain  US Non-ob Transvaginal          Plan   1. we'll schedule pelvic ultrasound and see the patient back after the ultrasound         This note was electronically signed.    Ildefonso Bey MD  September 29, 2017  "

## 2017-10-16 ENCOUNTER — OFFICE VISIT (OUTPATIENT)
Dept: OBSTETRICS AND GYNECOLOGY | Facility: CLINIC | Age: 62
End: 2017-10-16

## 2017-10-16 VITALS
HEIGHT: 66 IN | SYSTOLIC BLOOD PRESSURE: 122 MMHG | WEIGHT: 177 LBS | DIASTOLIC BLOOD PRESSURE: 70 MMHG | BODY MASS INDEX: 28.45 KG/M2

## 2017-10-16 DIAGNOSIS — R10.31 RLQ ABDOMINAL PAIN: Primary | ICD-10-CM

## 2017-10-16 PROCEDURE — 99213 OFFICE O/P EST LOW 20 MIN: CPT | Performed by: OBSTETRICS & GYNECOLOGY

## 2017-10-16 NOTE — PROGRESS NOTES
"Subjective   Chief Complaint   Patient presents with   • Follow-up     Humera Swartz is a 62 y.o. year old  who comes to review her recent testing and discuss next steps.The patient was seen on 17 with a chief complaint of some right lower quadrant pain.  She underwent an ultrasound today.  The uterus is normal in size with a volume of 53.25 cm³.  Both the right left ovaries appear to be within normal limits as well.    Review of systems is otherwise negative today    e   /70  Ht 66\" (167.6 cm)  Wt 177 lb (80.3 kg)  BMI 28.57 kg/m2    Physical Exam      General:  well developed; well nourished  no acute distress     Thyroid: not examined     Heart:  Not performed.     Lungs:  breathing is unlabored     Breasts:  Not performed.     Abdomen: Not performed.     Pelvis: Not performed.         Lab Review   No data reviewed      Imaging   Pelvic ultrasound report           Assessment    right lower quadrant pain with normal pelvic ultrasound     Plan   1. Follow-up as needed          This note was electronically signed.    Ildefonso Bey M.D.  2017    "

## 2017-11-27 RX ORDER — DEXLANSOPRAZOLE 60 MG/1
CAPSULE, DELAYED RELEASE ORAL
Qty: 30 CAPSULE | Refills: 0 | Status: SHIPPED | OUTPATIENT
Start: 2017-11-27 | End: 2017-12-28 | Stop reason: SDUPTHER

## 2017-12-28 ENCOUNTER — OFFICE VISIT (OUTPATIENT)
Dept: GASTROENTEROLOGY | Facility: CLINIC | Age: 62
End: 2017-12-28

## 2017-12-28 VITALS
HEART RATE: 72 BPM | SYSTOLIC BLOOD PRESSURE: 133 MMHG | WEIGHT: 179.2 LBS | HEIGHT: 66 IN | BODY MASS INDEX: 28.8 KG/M2 | DIASTOLIC BLOOD PRESSURE: 76 MMHG

## 2017-12-28 DIAGNOSIS — K21.00 GASTROESOPHAGEAL REFLUX DISEASE WITH ESOPHAGITIS: ICD-10-CM

## 2017-12-28 DIAGNOSIS — K44.9 HIATAL HERNIA: ICD-10-CM

## 2017-12-28 DIAGNOSIS — R10.84 GENERALIZED ABDOMINAL PAIN: Primary | ICD-10-CM

## 2017-12-28 PROCEDURE — 99213 OFFICE O/P EST LOW 20 MIN: CPT | Performed by: NURSE PRACTITIONER

## 2017-12-28 RX ORDER — DEXLANSOPRAZOLE 60 MG/1
60 CAPSULE, DELAYED RELEASE ORAL DAILY
Qty: 90 CAPSULE | Refills: 3 | Status: SHIPPED | OUTPATIENT
Start: 2017-12-28 | End: 2018-03-28

## 2017-12-28 NOTE — PROGRESS NOTES
Chief Complaint   Patient presents with   • Abdominal Pain       Subjective    Humera Swartz is a 62 y.o. female. she is here today for follow-up.    Abdominal Pain   This is a chronic problem. The current episode started more than 1 year ago. The pain is located in the RLQ. The pain is mild (like a pinch/attributed to scar tissue. ). The quality of the pain is colicky. The abdominal pain radiates to the back and RUQ. Pertinent negatives include no anorexia, arthralgias, diarrhea, fever, flatus, hematochezia, melena, myalgias, nausea, vomiting or weight loss. The treatment provided mild relief. Prior diagnostic workup includes GI consult and upper endoscopy.   62 year old patient  Presents to discuss Gastro-esophageal reflux disease.  She was last seen January 12, 2017.  At that time Dexilant was started and states that has helped reflux greatly.  Reports intermittent epigastric pain.  States abdominal pain radiates to her right upper quadrant seems to be worsened around ingestion of gastric irritants.  In 2015 she had a negative ultrasound of her gallbladder.  Her EGD and colonoscopy were completed February 2016 EGD noted mildly severe esophagitis, gastritis and a medium-sized hiatal hernia.  Colonoscopy noted diverticulosis otherwise normal exam.  States she is actively trying to lose weight.  .   Plan; recommend ultrasound abdomen complete to further evaluate source of abdominal pain.  Continue Dexilant. Discussed with patient remaining to follow-up with this CT scan if this is negative or other workup depending results of the above.  We'll follow-up in one month return to office sooner if needed.    The following portions of the patient's history were reviewed and updated as appropriate:   Past Medical History:   Diagnosis Date   • Acquired equinus deformity of foot     ANKLE   • Anxiety    • Breast cancer    • Cancer     BREAST   • Depression    • Diverticular disease of colon    • Drug therapy    •  Esophagitis    • Fibrocystic breast    • GERD (gastroesophageal reflux disease)    • History of bone density study 2012    NORMAL   • History of echocardiogram 2016    Normal LV systolic function.Ef of 55-60%.Grade 1 diastolic dysfunciton of the LV myocardium.Mild left atiral enlargement.No evidence of pericardial effusion   • History of mammogram 2011    one breast (Benign finding.)   • History of Papanicolaou smear of cervix 2011    NEGATIVE   • Hx of radiation therapy    • Hyperlipidemia    • Injury of head and neck    • Minor head injury    • Personal history of malignant neoplasm of breast    • Plantar fasciitis    • Primary fibromyalgia syndrome    • Solitary pulmonary nodule present on computed tomography of lung      Past Surgical History:   Procedure Laterality Date   • BREAST SURGERY      Carcinoma of the right breast;Mastectomy   •  SECTION     • COLONOSCOPY  2016    Normal colon.No specimens collected   • DIAGNOSTIC LAPAROSCOPY  1977    (Amenorrhea, probable polycystic ovaries. Polycystic ovaries   • ESOPHAGOSCOPY / EGD  2016    Mildly severe esophagitis.Gastritis.Normal examined duodenum.Medium sized hiatus hernia   • EXCISION BREAST LESION W/ PREOP NEEDLE LOC  1987    Bilateral excision of breast masses. Bilateral breast masses; probable fibrocystic disease.   • HYSTEROSCOPY  2011    Exam under anesthesia, diagnostic hysterectomy with fractional dilation and curettage. Thickened endometrial stripe, on Tamoxifen therapy.   • MASTECTOMY     • OTHER SURGICAL HISTORY  2016    NEEDLE BIOPSY LYMPH NODES; Ultrasound directed core needle biopsy of the left axilla.   • TUBAL ABDOMINAL LIGATION       Family History   Problem Relation Age of Onset   • Diabetes Other    • Arthritis Other    • Breast cancer Maternal Aunt      OB History      Para Term  AB Living    3 2 2  1 2    SAB TAB Ectopic Multiple Live Births    1         "    Current Outpatient Prescriptions   Medication Sig Dispense Refill   • anastrozole (ARIMIDEX) 1 MG tablet Take 1 tablet by mouth Daily. 30 tablet 3   • BIOTIN PO Take 1 tablet by mouth Daily.     • busPIRone (BUSPAR) 5 MG tablet TAKE 1 TABLET(S) BY MOUTH 2 TIMES PER DAY 30 tablet 0   • Cholecalciferol (VITAMIN D-3 PO) Take 1 tablet by mouth Daily.     • nitrofurantoin, macrocrystal-monohydrate, (MACROBID) 100 MG capsule Take 100 mg by mouth 2 (Two) Times a Day.  0   • Probiotic Product (PROBIOTIC DAILY PO) Take 1 capsule by mouth Daily.     • sertraline (ZOLOFT) 100 MG tablet TAKE 1/2 TABLET BY MOUTH EVERY DAY 7 tablet 0   • dexlansoprazole (DEXILANT) 60 MG capsule Take 1 capsule by mouth Daily for 90 days. 90 capsule 3     No current facility-administered medications for this visit.      Allergies   Allergen Reactions   • Erythromycin    • Phenergan [Promethazine Hcl]    • Promethazine    • Propofol      COUGH/WHEEZE   • Tetracyclines & Related      Social History     Social History   • Marital status: Single     Spouse name: N/A   • Number of children: N/A   • Years of education: N/A     Social History Main Topics   • Smoking status: Former Smoker   • Smokeless tobacco: Never Used      Comment: Ceased Smoking 12 Years Prior   • Alcohol use No   • Drug use: No   • Sexual activity: Defer     Other Topics Concern   • None     Social History Narrative       Review of Systems  Review of Systems   Constitutional: Negative for fever and weight loss.   Gastrointestinal: Positive for abdominal pain. Negative for anorexia, diarrhea, flatus, hematochezia, melena, nausea and vomiting.   Musculoskeletal: Negative for arthralgias and myalgias.        /76 (BP Location: Left arm, Patient Position: Sitting, Cuff Size: Adult)  Pulse 72  Ht 167.7 cm (66.02\")  Wt 81.3 kg (179 lb 3.2 oz)  BMI 28.9 kg/m2    Objective    Physical Exam   Constitutional: She is oriented to person, place, and time. She appears well-developed " and well-nourished. She is cooperative. No distress.   HENT:   Head: Normocephalic and atraumatic.   Neck: Normal range of motion. Neck supple. No thyromegaly present.   Cardiovascular: Normal rate, regular rhythm and normal heart sounds.    Pulmonary/Chest: Effort normal and breath sounds normal. She has no wheezes. She has no rhonchi. She has no rales.   Abdominal: Soft. Normal appearance and bowel sounds are normal. She exhibits no shifting dullness, no distension and no fluid wave. There is no hepatosplenomegaly. There is generalized tenderness. There is no rigidity and no guarding. No hernia.   Lymphadenopathy:     She has no cervical adenopathy.   Neurological: She is alert and oriented to person, place, and time.   Skin: Skin is warm, dry and intact. No rash noted. No pallor.   Psychiatric: She has a normal mood and affect. Her speech is normal.     Lab on 09/22/2017   Component Date Value Ref Range Status   • Glucose 09/22/2017 107* 60 - 100 mg/dL Final   • BUN 09/22/2017 17  7 - 21 mg/dL Final   • Creatinine 09/22/2017 0.70  0.50 - 1.00 mg/dL Final   • Sodium 09/22/2017 141  137 - 145 mmol/L Final   • Potassium 09/22/2017 4.0  3.5 - 5.1 mmol/L Final   • Chloride 09/22/2017 104  95 - 110 mmol/L Final   • CO2 09/22/2017 24.0  22.0 - 31.0 mmol/L Final   • Calcium 09/22/2017 9.9  8.4 - 10.2 mg/dL Final   • Total Protein 09/22/2017 7.4  6.3 - 8.6 g/dL Final   • Albumin 09/22/2017 4.30  3.40 - 4.80 g/dL Final   • ALT (SGPT) 09/22/2017 30  9 - 52 U/L Final   • AST (SGOT) 09/22/2017 24  14 - 36 U/L Final   • Alkaline Phosphatase 09/22/2017 118  38 - 126 U/L Final   • Total Bilirubin 09/22/2017 0.9  0.2 - 1.3 mg/dL Final   • eGFR Non African Amer 09/22/2017 85  >60 mL/min/1.73 Final   • Globulin 09/22/2017 3.1  2.3 - 3.5 gm/dL Final   • A/G Ratio 09/22/2017 1.4  1.1 - 1.8 g/dL Final   • BUN/Creatinine Ratio 09/22/2017 24.3  7.0 - 25.0 Final   • Anion Gap 09/22/2017 13.0  5.0 - 15.0 mmol/L Final   • WBC 09/22/2017  5.65  3.20 - 9.80 10*3/mm3 Final   • RBC 09/22/2017 4.66  3.77 - 5.16 10*6/mm3 Final   • Hemoglobin 09/22/2017 13.7  12.0 - 15.5 g/dL Final   • Hematocrit 09/22/2017 40.5  35.0 - 45.0 % Final   • MCV 09/22/2017 86.9  80.0 - 98.0 fL Final   • MCH 09/22/2017 29.4  26.5 - 34.0 pg Final   • MCHC 09/22/2017 33.8  31.4 - 36.0 g/dL Final   • RDW 09/22/2017 13.5  11.5 - 14.5 % Final   • RDW-SD 09/22/2017 42.5  36.4 - 46.3 fl Final   • MPV 09/22/2017 10.1  8.0 - 12.0 fL Final   • Platelets 09/22/2017 192  150 - 450 10*3/mm3 Final   • Neutrophil % 09/22/2017 61.7  37.0 - 80.0 % Final   • Lymphocyte % 09/22/2017 29.2  10.0 - 50.0 % Final   • Monocyte % 09/22/2017 6.0  0.0 - 12.0 % Final   • Eosinophil % 09/22/2017 2.5  0.0 - 7.0 % Final   • Basophil % 09/22/2017 0.4  0.0 - 2.0 % Final   • Immature Grans % 09/22/2017 0.2  0.0 - 0.5 % Final   • Neutrophils, Absolute 09/22/2017 3.49  2.00 - 8.60 10*3/mm3 Final   • Lymphocytes, Absolute 09/22/2017 1.65  0.60 - 4.20 10*3/mm3 Final   • Monocytes, Absolute 09/22/2017 0.34  0.00 - 0.90 10*3/mm3 Final   • Eosinophils, Absolute 09/22/2017 0.14  0.00 - 0.70 10*3/mm3 Final   • Basophils, Absolute 09/22/2017 0.02  0.00 - 0.20 10*3/mm3 Final   • Immature Grans, Absolute 09/22/2017 0.01  0.00 - 0.02 10*3/mm3 Final     Assessment/Plan      1. Generalized abdominal pain    2. Hiatal hernia    3. Gastroesophageal reflux disease with esophagitis    .     Review and/or summary of lab tests, radiology, procedures, medications. Review and summary of old records and obtaining of history. The risks and benefits of my recommendations, as well as other treatment options were discussed with the patient today. Questions were answered.    New Medications Ordered This Visit   Medications   • dexlansoprazole (DEXILANT) 60 MG capsule     Sig: Take 1 capsule by mouth Daily for 90 days.     Dispense:  90 capsule     Refill:  3       Follow-up: Return in about 4 weeks (around 1/25/2018) for Recheck after US  .          This document has been electronically signed by DAVID Ho on December 29, 2017 1:55 PM             Results for orders placed or performed in visit on 09/22/17   CBC Auto Differential   Result Value Ref Range    WBC 5.65 3.20 - 9.80 10*3/mm3    RBC 4.66 3.77 - 5.16 10*6/mm3    Hemoglobin 13.7 12.0 - 15.5 g/dL    Hematocrit 40.5 35.0 - 45.0 %    MCV 86.9 80.0 - 98.0 fL    MCH 29.4 26.5 - 34.0 pg    MCHC 33.8 31.4 - 36.0 g/dL    RDW 13.5 11.5 - 14.5 %    RDW-SD 42.5 36.4 - 46.3 fl    MPV 10.1 8.0 - 12.0 fL    Platelets 192 150 - 450 10*3/mm3    Neutrophil % 61.7 37.0 - 80.0 %    Lymphocyte % 29.2 10.0 - 50.0 %    Monocyte % 6.0 0.0 - 12.0 %    Eosinophil % 2.5 0.0 - 7.0 %    Basophil % 0.4 0.0 - 2.0 %    Immature Grans % 0.2 0.0 - 0.5 %    Neutrophils, Absolute 3.49 2.00 - 8.60 10*3/mm3    Lymphocytes, Absolute 1.65 0.60 - 4.20 10*3/mm3    Monocytes, Absolute 0.34 0.00 - 0.90 10*3/mm3    Eosinophils, Absolute 0.14 0.00 - 0.70 10*3/mm3    Basophils, Absolute 0.02 0.00 - 0.20 10*3/mm3    Immature Grans, Absolute 0.01 0.00 - 0.02 10*3/mm3   Comprehensive Metabolic Panel   Result Value Ref Range    Glucose 107 (H) 60 - 100 mg/dL    BUN 17 7 - 21 mg/dL    Creatinine 0.70 0.50 - 1.00 mg/dL    Sodium 141 137 - 145 mmol/L    Potassium 4.0 3.5 - 5.1 mmol/L    Chloride 104 95 - 110 mmol/L    CO2 24.0 22.0 - 31.0 mmol/L    Calcium 9.9 8.4 - 10.2 mg/dL    Total Protein 7.4 6.3 - 8.6 g/dL    Albumin 4.30 3.40 - 4.80 g/dL    ALT (SGPT) 30 9 - 52 U/L    AST (SGOT) 24 14 - 36 U/L    Alkaline Phosphatase 118 38 - 126 U/L    Total Bilirubin 0.9 0.2 - 1.3 mg/dL    eGFR Non African Amer 85 >60 mL/min/1.73    Globulin 3.1 2.3 - 3.5 gm/dL    A/G Ratio 1.4 1.1 - 1.8 g/dL    BUN/Creatinine Ratio 24.3 7.0 - 25.0    Anion Gap 13.0 5.0 - 15.0 mmol/L   Results for orders placed or performed during the hospital encounter of 06/17/17   Gold Top - SST   Result Value Ref Range    Extra Tube Hold for add-ons.    Green Top (Gel)    Result Value Ref Range    Extra Tube Hold for add-ons.    Urinalysis, Microscopic Only   Result Value Ref Range    RBC, UA 0-2 (A) None Seen /HPF    WBC, UA 13-20 (A) None Seen, 0-2, 3-5 /HPF    Bacteria, UA None Seen None Seen /HPF    Squamous Epithelial Cells, UA 6-12 (A) None Seen, 0-2 /HPF    Hyaline Casts, UA 3-6 None Seen /LPF    Methodology Automated Microscopy    Urinalysis With / Culture If Indicated   Result Value Ref Range    Color, UA Yellow Yellow, Straw, Dark Yellow, Cathie    Appearance, UA Clear Clear    pH, UA 6.5 5.0 - 9.0    Specific Gravity, UA 1.019 1.003 - 1.030    Glucose, UA Negative Negative    Ketones, UA Negative Negative    Bilirubin, UA Negative Negative    Blood, UA Trace (A) Negative    Protein, UA Negative Negative    Leuk Esterase, UA Large (3+) (A) Negative    Nitrite, UA Negative Negative    Urobilinogen, UA 0.2 E.U./dL 0.2 - 1.0 E.U./dL   CBC Auto Differential   Result Value Ref Range    WBC 11.41 (H) 3.20 - 9.80 10*3/mm3    RBC 4.25 3.77 - 5.16 10*6/mm3    Hemoglobin 12.6 12.0 - 15.5 g/dL    Hematocrit 37.4 35.0 - 45.0 %    MCV 88.0 80.0 - 98.0 fL    MCH 29.6 26.5 - 34.0 pg    MCHC 33.7 31.4 - 36.0 g/dL    RDW 13.5 11.5 - 14.5 %    RDW-SD 43.1 36.4 - 46.3 fl    MPV 10.3 8.0 - 12.0 fL    Platelets 217 150 - 450 10*3/mm3    Neutrophil % 74.4 37.0 - 80.0 %    Lymphocyte % 15.4 10.0 - 50.0 %    Monocyte % 9.4 0.0 - 12.0 %    Eosinophil % 0.1 0.0 - 7.0 %    Basophil % 0.1 0.0 - 2.0 %    Immature Grans % 0.6 (H) 0.0 - 0.5 %    Neutrophils, Absolute 8.49 2.00 - 8.60 10*3/mm3    Lymphocytes, Absolute 1.76 0.60 - 4.20 10*3/mm3    Monocytes, Absolute 1.07 (H) 0.00 - 0.90 10*3/mm3    Eosinophils, Absolute 0.01 0.00 - 0.70 10*3/mm3    Basophils, Absolute 0.01 0.00 - 0.20 10*3/mm3    Immature Grans, Absolute 0.07 (H) 0.00 - 0.02 10*3/mm3   Lavender Top   Result Value Ref Range    Extra Tube hold for add-on    Light Blue Top   Result Value Ref Range    Extra Tube hold for add-on    Rapid  Strep A Screen   Result Value Ref Range    Strep A Ag Negative Negative   D-dimer, Quantitative   Result Value Ref Range    D-Dimer, Quantitative 360 0 - 470 ng/mL (FEU)   Influenza Antigen   Result Value Ref Range    Influenza A Ag, EIA Negative Negative    Influenza B Ag, EIA Negative Negative   Urine Culture   Result Value Ref Range    Urine Culture Mixed Culture    Beta Strep Culture, Throat   Result Value Ref Range    Throat Culture, Beta Strep No Group A, C, or G Strep Isolated at 48 hours    BNP   Result Value Ref Range    proBNP 212.0 0.0 - 900.0 pg/mL   Comprehensive Metabolic Panel   Result Value Ref Range    Glucose 102 (H) 60 - 100 mg/dL    BUN 23 (H) 7 - 21 mg/dL    Creatinine 0.78 0.50 - 1.00 mg/dL    Sodium 141 137 - 145 mmol/L    Potassium 3.9 3.5 - 5.1 mmol/L    Chloride 104 95 - 110 mmol/L    CO2 26.0 22.0 - 31.0 mmol/L    Calcium 9.0 8.4 - 10.2 mg/dL    Total Protein 6.6 6.3 - 8.6 g/dL    Albumin 3.80 3.40 - 4.80 g/dL    ALT (SGPT) 33 9 - 52 U/L    AST (SGOT) 23 14 - 36 U/L    Alkaline Phosphatase 112 38 - 126 U/L    Total Bilirubin 0.5 0.2 - 1.3 mg/dL    eGFR Non  Amer 75 45 - 104 mL/min/1.73    Globulin 2.8 2.3 - 3.5 gm/dL    A/G Ratio 1.4 1.1 - 1.8 g/dL    BUN/Creatinine Ratio 29.5 (H) 7.0 - 25.0    Anion Gap 11.0 5.0 - 15.0 mmol/L     *Note: Due to a large number of results and/or encounters for the requested time period, some results have not been displayed. A complete set of results can be found in Results Review.

## 2018-01-03 ENCOUNTER — HOSPITAL ENCOUNTER (OUTPATIENT)
Dept: ULTRASOUND IMAGING | Facility: HOSPITAL | Age: 63
Discharge: HOME | End: 2018-01-03
Admitting: NURSE PRACTITIONER

## 2018-01-03 PROCEDURE — 76700 US EXAM ABDOM COMPLETE: CPT

## 2018-01-05 ENCOUNTER — TELEPHONE (OUTPATIENT)
Dept: GASTROENTEROLOGY | Facility: CLINIC | Age: 63
End: 2018-01-05

## 2018-01-05 DIAGNOSIS — K80.20 GALLSTONES: Primary | ICD-10-CM

## 2018-01-05 DIAGNOSIS — K80.41 CALCULUS OF BILE DUCT WITH CHOLECYSTITIS AND OBSTRUCTION, UNSPECIFIED CHOLECYSTITIS ACUITY: ICD-10-CM

## 2018-01-05 DIAGNOSIS — R10.11 RUQ PAIN: Primary | ICD-10-CM

## 2018-01-05 DIAGNOSIS — R10.11 RUQ ABDOMINAL PAIN: ICD-10-CM

## 2018-01-05 NOTE — TELEPHONE ENCOUNTER
----- Message from DAVID Mazariegos sent at 1/5/2018 10:31 AM CST -----  Please call patient with results. If she is still having pain and would like to discuss gallbladder removal I can put in  a referral in to a surgeon.

## 2018-01-05 NOTE — TELEPHONE ENCOUNTER
Attempted to call patient per Dorita HERNANDEZ in regards to patient lab result patient and to also see if patient was still having abdominal pain. To also inform patient that we could refer her to a surgeon to remove her gallbladder. Patient  did not answer therefore I left a call back number. I also left a message identifying who I was and where I was calling from.

## 2018-01-15 ENCOUNTER — HOSPITAL ENCOUNTER (OUTPATIENT)
Facility: HOSPITAL | Age: 63
Setting detail: HOSPITAL OUTPATIENT SURGERY
End: 2018-01-15
Attending: SURGERY | Admitting: SURGERY

## 2018-01-15 ENCOUNTER — OFFICE VISIT (OUTPATIENT)
Dept: SURGERY | Facility: CLINIC | Age: 63
End: 2018-01-15

## 2018-01-15 VITALS
BODY MASS INDEX: 29.22 KG/M2 | DIASTOLIC BLOOD PRESSURE: 80 MMHG | HEIGHT: 66 IN | WEIGHT: 181.8 LBS | SYSTOLIC BLOOD PRESSURE: 130 MMHG

## 2018-01-15 DIAGNOSIS — K80.20 GALLSTONES: Primary | ICD-10-CM

## 2018-01-15 PROCEDURE — 99213 OFFICE O/P EST LOW 20 MIN: CPT | Performed by: SURGERY

## 2018-01-15 RX ORDER — SODIUM CHLORIDE, SODIUM LACTATE, POTASSIUM CHLORIDE, CALCIUM CHLORIDE 600; 310; 30; 20 MG/100ML; MG/100ML; MG/100ML; MG/100ML
100 INJECTION, SOLUTION INTRAVENOUS CONTINUOUS
Status: CANCELLED | OUTPATIENT
Start: 2018-01-15

## 2018-01-15 RX ORDER — ACETAMINOPHEN 325 MG/1
650 TABLET ORAL ONCE
Status: CANCELLED | OUTPATIENT
Start: 2018-01-15 | End: 2018-01-15

## 2018-01-15 RX ORDER — NITROFURANTOIN MACROCRYSTALS 100 MG/1
CAPSULE ORAL
Refills: 3 | COMMUNITY
Start: 2018-01-11 | End: 2018-09-03

## 2018-01-15 NOTE — PROGRESS NOTES
Chief Complaint   Patient presents with   • Follow-up     Recheck left breast and gallstones        HPI    62-year-old female with a past medical history significant for right-sided breast cancer, stage III diagnosed in 2008, status post mastectomy followed by chemotherapy and radiation.  Currently on Arimidex since January 2013 is here for follow-up visit today.  Denies any abnormal swollen and anywhere in the body.  Complains of generalized bone pain.      Additionally, the patient notes right upper quadrant abdominal pain intermittently and occurring after eating.  She had no fever, chills, nausea or vomiting, or diarrhea.  The pain has been persistent and has become worse.  Ultrasound of the abdomen performed on 1/3/2018 demonstrates the following:    ULTRASOUND OF THE ABDOMEN     HISTORY: Abdominal pain. Generalized abdominal pain.     Ultrasound examination of the abdomen was performed.     COMPARISON: Gallbladder ultrasound November 3, 2015     Suggestion of minimal degree of fatty infiltration of the liver.  No focal intrahepatic lesion.  The gallbladder contains at least one 6 mm calculus.  Right upper quadrant tenderness.  No wall thickening or pericholecystic fluid.  Common bile duct is within normal limits at 6 mm.  Normal pancreas.  Normal kidneys.  Unremarkable spleen.  Unremarkable IVC.  No abdominal aortic aneurysm.  No free fluid.     IMPRESSION:  CONCLUSION:  Cholelithiasis.  Right upper quadrant tenderness.  Suggestion of minimal degree of fatty infiltration of the liver.     [id number]     Electronically signed by:  Justyn Suero MD  1/3/2018 12:58 PM [employer]  Workstation: [employer]    Past Medical History:   Diagnosis Date   • Acquired equinus deformity of foot     ANKLE   • Anxiety    • Breast cancer    • Cancer     BREAST   • Depression    • Diverticular disease of colon    • Drug therapy    • Esophagitis    • Fibrocystic breast    • GERD (gastroesophageal reflux disease)    • History of bone  density study 2012    NORMAL   • History of echocardiogram 2016    Normal LV systolic function.Ef of 55-60%.Grade 1 diastolic dysfunciton of the LV myocardium.Mild left atiral enlargement.No evidence of pericardial effusion   • History of mammogram 2011    one breast (Benign finding.)   • History of Papanicolaou smear of cervix 2011    NEGATIVE   • Hx of radiation therapy    • Hyperlipidemia    • Injury of head and neck    • Minor head injury    • Personal history of malignant neoplasm of breast    • Plantar fasciitis    • Primary fibromyalgia syndrome    • Solitary pulmonary nodule present on computed tomography of lung        Past Surgical History:   Procedure Laterality Date   • BREAST SURGERY      Carcinoma of the right breast;Mastectomy   •  SECTION     • COLONOSCOPY  2016    Normal colon.No specimens collected   • DIAGNOSTIC LAPAROSCOPY  1977    (Amenorrhea, probable polycystic ovaries. Polycystic ovaries   • ESOPHAGOSCOPY / EGD  2016    Mildly severe esophagitis.Gastritis.Normal examined duodenum.Medium sized hiatus hernia   • EXCISION BREAST LESION W/ PREOP NEEDLE LOC  1987    Bilateral excision of breast masses. Bilateral breast masses; probable fibrocystic disease.   • HYSTEROSCOPY  2011    Exam under anesthesia, diagnostic hysterectomy with fractional dilation and curettage. Thickened endometrial stripe, on Tamoxifen therapy.   • MASTECTOMY     • OTHER SURGICAL HISTORY  2016    NEEDLE BIOPSY LYMPH NODES; Ultrasound directed core needle biopsy of the left axilla.   • TUBAL ABDOMINAL LIGATION           Current Outpatient Prescriptions:   •  anastrozole (ARIMIDEX) 1 MG tablet, Take 1 tablet by mouth Daily., Disp: 30 tablet, Rfl: 3  •  BIOTIN PO, Take 1 tablet by mouth Daily., Disp: , Rfl:   •  busPIRone (BUSPAR) 5 MG tablet, TAKE 1 TABLET(S) BY MOUTH 2 TIMES PER DAY, Disp: 30 tablet, Rfl: 0  •  Cholecalciferol (VITAMIN D-3 PO), Take 1 tablet  by mouth Daily., Disp: , Rfl:   •  dexlansoprazole (DEXILANT) 60 MG capsule, Take 1 capsule by mouth Daily for 90 days., Disp: 90 capsule, Rfl: 3  •  nitrofurantoin (MACRODANTIN) 100 MG capsule, TAKE 1 CAPSULE BY MOUTH DAILY, Disp: , Rfl: 3  •  Probiotic Product (PROBIOTIC DAILY PO), Take 1 capsule by mouth Daily., Disp: , Rfl:   •  sertraline (ZOLOFT) 100 MG tablet, TAKE 1/2 TABLET BY MOUTH EVERY DAY, Disp: 7 tablet, Rfl: 0    Allergies   Allergen Reactions   • Erythromycin    • Phenergan [Promethazine Hcl]    • Promethazine    • Propofol      COUGH/WHEEZE   • Tetracyclines & Related        Family History   Problem Relation Age of Onset   • Diabetes Other    • Arthritis Other    • Breast cancer Maternal Aunt        Social History     Social History   • Marital status: Single     Social History Main Topics   • Smoking status: Former Smoker   • Smokeless tobacco: Never Used      Comment: Ceased Smoking 12 Years Prior   • Alcohol use No   • Drug use: No       Review of Systems   Constitutional: Negative for appetite change, chills, fever and unexpected weight change.   HENT: Negative for hearing loss, nosebleeds and trouble swallowing.    Eyes: Negative for visual disturbance.   Respiratory: Negative for apnea, cough, choking, chest tightness, shortness of breath, wheezing and stridor.    Cardiovascular: Negative for chest pain, palpitations and leg swelling.   Gastrointestinal: Positive for abdominal pain. Negative for abdominal distention, blood in stool, constipation, diarrhea, nausea and vomiting.   Endocrine: Negative for cold intolerance, heat intolerance, polydipsia, polyphagia and polyuria.   Genitourinary: Negative for difficulty urinating, dysuria, frequency, hematuria and urgency.   Musculoskeletal: Negative for arthralgias, back pain, myalgias and neck pain.   Skin: Negative for color change, pallor and rash.   Allergic/Immunologic: Negative for immunocompromised state.   Neurological: Negative for  dizziness, seizures, syncope, light-headedness, numbness and headaches.   Hematological: Negative for adenopathy.   Psychiatric/Behavioral: Negative for suicidal ideas. The patient is not nervous/anxious.        Physical Exam   Constitutional: She is oriented to person, place, and time. She appears well-developed and well-nourished. No distress.   HENT:   Head: Normocephalic and atraumatic.   Eyes: EOM are normal. Pupils are equal, round, and reactive to light. No scleral icterus.   Neck: Normal range of motion. Neck supple. No JVD present. No tracheal deviation present. No thyromegaly present.   Cardiovascular: Normal rate and regular rhythm.    No murmur heard.  Pulmonary/Chest: Effort normal and breath sounds normal. No stridor. No respiratory distress. She has no wheezes. Left breast exhibits no inverted nipple, no mass, no nipple discharge, no skin change and no tenderness.       Abdominal: Soft. Bowel sounds are normal. She exhibits no distension and no mass. There is tenderness. There is no rebound and no guarding. No hernia.   Lymphadenopathy:     She has no cervical adenopathy.   Neurological: She is alert and oriented to person, place, and time.   Skin: Skin is warm and dry. No rash noted. She is not diaphoretic. No erythema. No pallor.   Psychiatric: She has a normal mood and affect. Her behavior is normal. Judgment and thought content normal.   Vitals reviewed.        ASSESSMENT    Humera was seen today for follow-up.    Diagnoses and all orders for this visit:    Gallstones  -     Comprehensive Metabolic Panel; Future  -     ECG 12 Lead; Future  -     lactated ringers infusion; Infuse 100 mL/hr into a venous catheter Continuous.  -     ceFAZolin (ANCEF) 2 g in sodium chloride 0.9 % 100 mL IVPB; Infuse 2 g into a venous catheter 1 (One) Time.  -     acetaminophen (TYLENOL) tablet 650 mg; Take 2 tablets by mouth 1 (One) Time.  -     Case Request; Standing    Other orders  -     Follow anesthesia standing  orders.  -     Provide instructions to patient on NPO status  -     Follow anesthesia standing orders.; Standing  -     Verify NPO Status; Standing  -     Obtain informed consent; Standing  -     SCD (sequential compression device)- to be placed on patient in Pre-op; Standing      PLAN    1.  Schedule patient for laparoscopic possible open cholecystectomy with cholangiogram.  The following were discussed.:    What are the indications that have led your doctor to the opinion that an operation is necessary?    * Symptoms and studies indicate that there is gallbladder disease causing pain the abdomen.    What, if any, alternative treatments are available for your condition?    * Watching and waiting with no surgery  * Removing the gallbladder through one large incision made in the abdomen.    What will be the likely result if you don't have the operation?    * Pain, infection, inflammation, and/or stones may continue or get worse    What are the basic procedures involved in the operation?    * The surgery may be done laparoscopically. Laparoscopic surgery is done using a scope and hollow tube(s) called ports. These are inserted through small cuts in the abdomen. A scope is a thin, lighted instrument with a camera attached. Tools are passed through the ports. Carbon dioxide gas is pumped into the abdomen to create workspace.   If the surgery cannot be performed with a scope, it may be done through a larger cut. This occurs 2% of the time.  The gall bladder will be removed after it is  from the liver, bile duct, and surrounding arteries. An x-ray of the bile ducts is usually performed with contrast dye injected into the ducts.  If stones are found, another procedure may be required to remove them.  A drain may rarely be inserted to keep fluid from building up in the treatment area.     What are the risks?    * Exposure to radiation. Pregnant women and women of childbearing age should talk with their doctor about  this.  * Pain, numbness, swelling, weakness or scarring where tissue is cut.  * The gas used in laparoscopic procedures to inflate the abdomen can become trapped in tissues. Gas in the bloodstream can dangerously affect blood flow and  heart function.  * The procedure may not cure or relieve your condition or symptoms. They may come back and even worsen.  * You may need additional tests or treatment.  * Bleeding. You may need blood transfusions or other treatments. This may be discovered during the procedure, or later.  * Embolism. An embolism is an object that moves through your body in your bloodstream. It can be an air bubble, a blood clot, a piece of fat or other material. It can block a blood vessel. This can lead to stroke, pulmonary embolism (blockage of the main artery of the lung), or injury to organs or extremities.  * Reactions to dye used for imaging. These may include hives, swelling of the face and/or throat, difficulty breathing, and kidney failure.  * Retained stones in bile ducts.  * Wound infection, poor healing or reopening. Blood or clear fluid can also collect at the wound site(s).  * Damage to the bile ducts or nearby structures. This may be discovered during the procedure, or later.  * Incisional hernia. Weak scar tissue may allow tissue to bulge through the cut.  * The instrument(s) placed in your abdomen can cause injuries to nearby structures.  * Death.    How is the operation expected to improve your health or quality of life?    * Operation is expected to decrease pain and nausea/vomiting associated with gallstones.  * This procedure may relieve or prevent infection, inflammation, and/or pain from stones or blockage of bile ducts.    Is hospitalization necessary and, if so, how long can you expect to be hospitalized?    * Most often, the operation is performed on an outpatient basis. Occasionally hospitalization is necessary for pain or nausea control    What can you expect during your  recovery period?    * The gas used in laparoscopic procedures to inflate the abdomen can become trapped    in tissues. Shoulder pain may occur for a few days after surgery.   * Nausea and pain control are obtained with oral medication.  * If you are on metformin, it will need to be held for 48 hours after surgery    When can you expect to resume normal activities?    * Normal activity can be resumed in 10-14 days if the procedure is performed laparoscopically. Open operations require no lifting or straining for 6 weeks.     Are there likely to be residual effects from the operation?    * Diarrhea often occurs, mostly temporary. Occasionally there is still minor food intolerance.    All questions were answered. The patient agrees to operation.                This document has been electronically signed by Dirk Leigh MD on January 15, 2018 10:59 AM

## 2018-01-29 ENCOUNTER — APPOINTMENT (OUTPATIENT)
Dept: ONCOLOGY | Facility: HOSPITAL | Age: 63
End: 2018-01-29

## 2018-01-29 ENCOUNTER — OFFICE VISIT (OUTPATIENT)
Dept: ONCOLOGY | Facility: CLINIC | Age: 63
End: 2018-01-29

## 2018-01-29 DIAGNOSIS — Z85.3 HX: BREAST CANCER: Primary | ICD-10-CM

## 2018-02-06 ENCOUNTER — OFFICE VISIT (OUTPATIENT)
Dept: GASTROENTEROLOGY | Facility: CLINIC | Age: 63
End: 2018-02-06

## 2018-02-06 ENCOUNTER — APPOINTMENT (OUTPATIENT)
Dept: ONCOLOGY | Facility: HOSPITAL | Age: 63
End: 2018-02-06

## 2018-02-06 VITALS
SYSTOLIC BLOOD PRESSURE: 124 MMHG | WEIGHT: 178.6 LBS | BODY MASS INDEX: 28.7 KG/M2 | HEART RATE: 74 BPM | HEIGHT: 66 IN | DIASTOLIC BLOOD PRESSURE: 78 MMHG

## 2018-02-06 DIAGNOSIS — K21.00 GASTROESOPHAGEAL REFLUX DISEASE WITH ESOPHAGITIS: Primary | ICD-10-CM

## 2018-02-06 DIAGNOSIS — R10.13 EPIGASTRIC PAIN: ICD-10-CM

## 2018-02-06 PROCEDURE — 99213 OFFICE O/P EST LOW 20 MIN: CPT | Performed by: NURSE PRACTITIONER

## 2018-02-06 NOTE — PROGRESS NOTES
Chief Complaint   Patient presents with   • Abdominal Pain       Subjective    Humera Swartz is a 63 y.o. female. she is here today for follow-up.    Abdominal Pain   This is a chronic problem. The current episode started more than 1 year ago. The pain is located in the RLQ. The pain is mild (like a pinch/attributed to scar tissue. ). The quality of the pain is colicky. The abdominal pain radiates to the back and RUQ. Pertinent negatives include no anorexia, arthralgias, diarrhea, fever, flatus, hematochezia, melena, myalgias, nausea, vomiting or weight loss. The treatment provided mild relief. Prior diagnostic workup includes GI consult and upper endoscopy.   62 year old patient  Presents for follow-up regarding gastroesophageal reflux disease.  She was last seen 12-28-17.  At that time Dexilant was started and states that has helped reflux greatly.  Reports intermittent epigastric and right upper quadrant pain.  She had ultrasound which noted gallstone and she is followed up with Dr. Leigh.  Cholecystectomy is scheduled for February 28.  She is anxious about procedure due to other upcoming life events.  She has follow-up with Dr. Leigh February 21 and will discuss this further with him.    Her EGD and colonoscopy were completed February 2016 EGD noted mildly severe esophagitis, gastritis and a medium-sized hiatal hernia.  Colonoscopy noted diverticulosis otherwise normal exam.  States she is actively trying to lose weight, states it has been more difficult to lose weight on Arimidex.   Plan; Continue Dexilant.  Follow-up in 6 months regarding GERD return to office sooner if needed.    The following portions of the patient's history were reviewed and updated as appropriate:   Past Medical History:   Diagnosis Date   • Acquired equinus deformity of foot     ANKLE   • Anxiety    • Breast cancer    • Cancer     BREAST   • Depression    • Diverticular disease of colon    • Drug therapy    • Esophagitis    • Fibrocystic  breast    • GERD (gastroesophageal reflux disease)    • History of bone density study 2012    NORMAL   • History of echocardiogram 2016    Normal LV systolic function.Ef of 55-60%.Grade 1 diastolic dysfunciton of the LV myocardium.Mild left atiral enlargement.No evidence of pericardial effusion   • History of mammogram 2011    one breast (Benign finding.)   • History of Papanicolaou smear of cervix 2011    NEGATIVE   • Hx of radiation therapy    • Hyperlipidemia    • Injury of head and neck    • Minor head injury    • Personal history of malignant neoplasm of breast    • Plantar fasciitis    • Primary fibromyalgia syndrome    • Solitary pulmonary nodule present on computed tomography of lung      Past Surgical History:   Procedure Laterality Date   • BREAST SURGERY      Carcinoma of the right breast;Mastectomy   •  SECTION     • COLONOSCOPY  2016    Normal colon.No specimens collected   • DIAGNOSTIC LAPAROSCOPY  1977    (Amenorrhea, probable polycystic ovaries. Polycystic ovaries   • ESOPHAGOSCOPY / EGD  2016    Mildly severe esophagitis.Gastritis.Normal examined duodenum.Medium sized hiatus hernia   • EXCISION BREAST LESION W/ PREOP NEEDLE LOC  1987    Bilateral excision of breast masses. Bilateral breast masses; probable fibrocystic disease.   • HYSTEROSCOPY  2011    Exam under anesthesia, diagnostic hysterectomy with fractional dilation and curettage. Thickened endometrial stripe, on Tamoxifen therapy.   • MASTECTOMY     • OTHER SURGICAL HISTORY  2016    NEEDLE BIOPSY LYMPH NODES; Ultrasound directed core needle biopsy of the left axilla.   • TUBAL ABDOMINAL LIGATION       Family History   Problem Relation Age of Onset   • Diabetes Other    • Arthritis Other    • Breast cancer Maternal Aunt      OB History      Para Term  AB Living    3 2 2  1 2    SAB TAB Ectopic Multiple Live Births    1            Current Outpatient Prescriptions  "  Medication Sig Dispense Refill   • anastrozole (ARIMIDEX) 1 MG tablet Take 1 tablet by mouth Daily. 30 tablet 3   • BIOTIN PO Take 1 tablet by mouth Daily.     • busPIRone (BUSPAR) 5 MG tablet TAKE 1 TABLET(S) BY MOUTH 2 TIMES PER DAY 30 tablet 0   • Cholecalciferol (VITAMIN D-3 PO) Take 1 tablet by mouth Daily.     • dexlansoprazole (DEXILANT) 60 MG capsule Take 1 capsule by mouth Daily for 90 days. 90 capsule 3   • nitrofurantoin (MACRODANTIN) 100 MG capsule TAKE 1 CAPSULE BY MOUTH DAILY  3   • Probiotic Product (PROBIOTIC DAILY PO) Take 1 capsule by mouth Daily.     • sertraline (ZOLOFT) 100 MG tablet TAKE 1/2 TABLET BY MOUTH EVERY DAY 7 tablet 0     No current facility-administered medications for this visit.      Allergies   Allergen Reactions   • Erythromycin    • Phenergan [Promethazine Hcl]    • Promethazine    • Propofol      COUGH/WHEEZE   • Tetracyclines & Related      Social History     Social History   • Marital status: Single     Spouse name: N/A   • Number of children: N/A   • Years of education: N/A     Social History Main Topics   • Smoking status: Former Smoker   • Smokeless tobacco: Never Used      Comment: Ceased Smoking 12 Years Prior   • Alcohol use No   • Drug use: No   • Sexual activity: Defer     Other Topics Concern   • None     Social History Narrative       Review of Systems  Review of Systems   Constitutional: Negative for fever and weight loss.   Gastrointestinal: Positive for abdominal pain. Negative for anorexia, diarrhea, flatus, hematochezia, melena, nausea and vomiting.   Musculoskeletal: Negative for arthralgias and myalgias.        /78 (BP Location: Left arm, Patient Position: Sitting, Cuff Size: Adult)  Pulse 74  Ht 167.6 cm (65.98\")  Wt 81 kg (178 lb 9.6 oz)  BMI 28.84 kg/m2    Objective    Physical Exam   Constitutional: She is oriented to person, place, and time. She appears well-developed and well-nourished. She is cooperative. No distress.   HENT:   Head: " Normocephalic and atraumatic.   Neck: Normal range of motion. Neck supple. No thyromegaly present.   Cardiovascular: Normal rate, regular rhythm and normal heart sounds.    Pulmonary/Chest: Effort normal and breath sounds normal. She has no wheezes. She has no rhonchi. She has no rales.   Abdominal: Soft. Normal appearance and bowel sounds are normal. She exhibits no shifting dullness, no distension and no fluid wave. There is no hepatosplenomegaly. There is tenderness in the right upper quadrant and right lower quadrant. There is no rigidity and no guarding. No hernia.   Lymphadenopathy:     She has no cervical adenopathy.   Neurological: She is alert and oriented to person, place, and time.   Skin: Skin is warm, dry and intact. No rash noted. No pallor.   Psychiatric: She has a normal mood and affect. Her speech is normal.     Admission on 01/27/2018, Discharged on 01/27/2018   Component Date Value Ref Range Status   • Rapid Influenza A Ag 01/27/2018 neg   Final   • Rapid Influenza B Ag 01/27/2018 neg   Final   • Internal Control 01/27/2018 Passed  Passed Final   • Lot Number 01/27/2018 [id number]   Final   • Expiration Date 01/27/2018 10/19   Final     Assessment/Plan      1. Gastroesophageal reflux disease with esophagitis    2. Epigastric pain    .     Review and/or summary of lab tests, radiology, procedures, medications. Review and summary of old records and obtaining of history. The risks and benefits of my recommendations, as well as other treatment options were discussed with the patient today. Questions were answered.    No orders of the defined types were placed in this encounter.      Follow-up: Return in about 6 months (around 8/6/2018).          This document has been electronically signed by DAVID Ho on February 6, 2018 3:15 PM             Results for orders placed or performed during the hospital encounter of 01/27/18   POCT Influenza A/B   Result Value Ref Range    Rapid Influenza A Ag neg      Rapid Influenza B Ag neg     Internal Control Passed Passed    Lot Number [id number]     Expiration Date 10/19    Results for orders placed or performed in visit on 09/22/17   CBC Auto Differential   Result Value Ref Range    WBC 5.65 3.20 - 9.80 10*3/mm3    RBC 4.66 3.77 - 5.16 10*6/mm3    Hemoglobin 13.7 12.0 - 15.5 g/dL    Hematocrit 40.5 35.0 - 45.0 %    MCV 86.9 80.0 - 98.0 fL    MCH 29.4 26.5 - 34.0 pg    MCHC 33.8 31.4 - 36.0 g/dL    RDW 13.5 11.5 - 14.5 %    RDW-SD 42.5 36.4 - 46.3 fl    MPV 10.1 8.0 - 12.0 fL    Platelets 192 150 - 450 10*3/mm3    Neutrophil % 61.7 37.0 - 80.0 %    Lymphocyte % 29.2 10.0 - 50.0 %    Monocyte % 6.0 0.0 - 12.0 %    Eosinophil % 2.5 0.0 - 7.0 %    Basophil % 0.4 0.0 - 2.0 %    Immature Grans % 0.2 0.0 - 0.5 %    Neutrophils, Absolute 3.49 2.00 - 8.60 10*3/mm3    Lymphocytes, Absolute 1.65 0.60 - 4.20 10*3/mm3    Monocytes, Absolute 0.34 0.00 - 0.90 10*3/mm3    Eosinophils, Absolute 0.14 0.00 - 0.70 10*3/mm3    Basophils, Absolute 0.02 0.00 - 0.20 10*3/mm3    Immature Grans, Absolute 0.01 0.00 - 0.02 10*3/mm3   Comprehensive Metabolic Panel   Result Value Ref Range    Glucose 107 (H) 60 - 100 mg/dL    BUN 17 7 - 21 mg/dL    Creatinine 0.70 0.50 - 1.00 mg/dL    Sodium 141 137 - 145 mmol/L    Potassium 4.0 3.5 - 5.1 mmol/L    Chloride 104 95 - 110 mmol/L    CO2 24.0 22.0 - 31.0 mmol/L    Calcium 9.9 8.4 - 10.2 mg/dL    Total Protein 7.4 6.3 - 8.6 g/dL    Albumin 4.30 3.40 - 4.80 g/dL    ALT (SGPT) 30 9 - 52 U/L    AST (SGOT) 24 14 - 36 U/L    Alkaline Phosphatase 118 38 - 126 U/L    Total Bilirubin 0.9 0.2 - 1.3 mg/dL    eGFR Non African Amer 85 >60 mL/min/1.73    Globulin 3.1 2.3 - 3.5 gm/dL    A/G Ratio 1.4 1.1 - 1.8 g/dL    BUN/Creatinine Ratio 24.3 7.0 - 25.0    Anion Gap 13.0 5.0 - 15.0 mmol/L   Results for orders placed or performed during the hospital encounter of 06/17/17   Gold Top - SST   Result Value Ref Range    Extra Tube Hold for add-ons.    Green Top (Gel)   Result  Value Ref Range    Extra Tube Hold for add-ons.    Urinalysis, Microscopic Only   Result Value Ref Range    RBC, UA 0-2 (A) None Seen /HPF    WBC, UA 13-20 (A) None Seen, 0-2, 3-5 /HPF    Bacteria, UA None Seen None Seen /HPF    Squamous Epithelial Cells, UA 6-12 (A) None Seen, 0-2 /HPF    Hyaline Casts, UA 3-6 None Seen /LPF    Methodology Automated Microscopy    Urinalysis With / Culture If Indicated   Result Value Ref Range    Color, UA Yellow Yellow, Straw, Dark Yellow, Cathie    Appearance, UA Clear Clear    pH, UA 6.5 5.0 - 9.0    Specific Gravity, UA 1.019 1.003 - 1.030    Glucose, UA Negative Negative    Ketones, UA Negative Negative    Bilirubin, UA Negative Negative    Blood, UA Trace (A) Negative    Protein, UA Negative Negative    Leuk Esterase, UA Large (3+) (A) Negative    Nitrite, UA Negative Negative    Urobilinogen, UA 0.2 E.U./dL 0.2 - 1.0 E.U./dL   CBC Auto Differential   Result Value Ref Range    WBC 11.41 (H) 3.20 - 9.80 10*3/mm3    RBC 4.25 3.77 - 5.16 10*6/mm3    Hemoglobin 12.6 12.0 - 15.5 g/dL    Hematocrit 37.4 35.0 - 45.0 %    MCV 88.0 80.0 - 98.0 fL    MCH 29.6 26.5 - 34.0 pg    MCHC 33.7 31.4 - 36.0 g/dL    RDW 13.5 11.5 - 14.5 %    RDW-SD 43.1 36.4 - 46.3 fl    MPV 10.3 8.0 - 12.0 fL    Platelets 217 150 - 450 10*3/mm3    Neutrophil % 74.4 37.0 - 80.0 %    Lymphocyte % 15.4 10.0 - 50.0 %    Monocyte % 9.4 0.0 - 12.0 %    Eosinophil % 0.1 0.0 - 7.0 %    Basophil % 0.1 0.0 - 2.0 %    Immature Grans % 0.6 (H) 0.0 - 0.5 %    Neutrophils, Absolute 8.49 2.00 - 8.60 10*3/mm3    Lymphocytes, Absolute 1.76 0.60 - 4.20 10*3/mm3    Monocytes, Absolute 1.07 (H) 0.00 - 0.90 10*3/mm3    Eosinophils, Absolute 0.01 0.00 - 0.70 10*3/mm3    Basophils, Absolute 0.01 0.00 - 0.20 10*3/mm3    Immature Grans, Absolute 0.07 (H) 0.00 - 0.02 10*3/mm3   Lavender Top   Result Value Ref Range    Extra Tube hold for add-on    Light Blue Top   Result Value Ref Range    Extra Tube hold for add-on    Rapid Strep A  Screen   Result Value Ref Range    Strep A Ag Negative Negative   D-dimer, Quantitative   Result Value Ref Range    D-Dimer, Quantitative 360 0 - 470 ng/mL (FEU)   Influenza Antigen   Result Value Ref Range    Influenza A Ag, EIA Negative Negative    Influenza B Ag, EIA Negative Negative   Urine Culture   Result Value Ref Range    Urine Culture Mixed Culture    Beta Strep Culture, Throat   Result Value Ref Range    Throat Culture, Beta Strep No Group A, C, or G Strep Isolated at 48 hours    BNP   Result Value Ref Range    proBNP 212.0 0.0 - 900.0 pg/mL   Comprehensive Metabolic Panel   Result Value Ref Range    Glucose 102 (H) 60 - 100 mg/dL    BUN 23 (H) 7 - 21 mg/dL    Creatinine 0.78 0.50 - 1.00 mg/dL    Sodium 141 137 - 145 mmol/L    Potassium 3.9 3.5 - 5.1 mmol/L    Chloride 104 95 - 110 mmol/L    CO2 26.0 22.0 - 31.0 mmol/L    Calcium 9.0 8.4 - 10.2 mg/dL    Total Protein 6.6 6.3 - 8.6 g/dL    Albumin 3.80 3.40 - 4.80 g/dL    ALT (SGPT) 33 9 - 52 U/L    AST (SGOT) 23 14 - 36 U/L    Alkaline Phosphatase 112 38 - 126 U/L    Total Bilirubin 0.5 0.2 - 1.3 mg/dL    eGFR Non  Amer 75 45 - 104 mL/min/1.73    Globulin 2.8 2.3 - 3.5 gm/dL    A/G Ratio 1.4 1.1 - 1.8 g/dL    BUN/Creatinine Ratio 29.5 (H) 7.0 - 25.0    Anion Gap 11.0 5.0 - 15.0 mmol/L     *Note: Due to a large number of results and/or encounters for the requested time period, some results have not been displayed. A complete set of results can be found in Results Review.

## 2018-02-13 ENCOUNTER — APPOINTMENT (OUTPATIENT)
Dept: ONCOLOGY | Facility: HOSPITAL | Age: 63
End: 2018-02-13

## 2018-02-13 ENCOUNTER — APPOINTMENT (OUTPATIENT)
Dept: ONCOLOGY | Facility: CLINIC | Age: 63
End: 2018-02-13

## 2018-02-16 ENCOUNTER — APPOINTMENT (OUTPATIENT)
Dept: ONCOLOGY | Facility: HOSPITAL | Age: 63
End: 2018-02-16

## 2018-02-19 ENCOUNTER — LAB (OUTPATIENT)
Dept: ONCOLOGY | Facility: HOSPITAL | Age: 63
End: 2018-02-19

## 2018-02-19 ENCOUNTER — OFFICE VISIT (OUTPATIENT)
Dept: ONCOLOGY | Facility: CLINIC | Age: 63
End: 2018-02-19

## 2018-02-19 VITALS
HEART RATE: 63 BPM | WEIGHT: 178.79 LBS | HEIGHT: 66 IN | BODY MASS INDEX: 28.73 KG/M2 | DIASTOLIC BLOOD PRESSURE: 78 MMHG | TEMPERATURE: 98.3 F | SYSTOLIC BLOOD PRESSURE: 156 MMHG | RESPIRATION RATE: 18 BRPM

## 2018-02-19 DIAGNOSIS — Z85.3 HX: BREAST CANCER: ICD-10-CM

## 2018-02-19 LAB
ALBUMIN SERPL-MCNC: 4.1 G/DL (ref 3.4–4.8)
ALBUMIN/GLOB SERPL: 1.4 G/DL (ref 1.1–1.8)
ALP SERPL-CCNC: 122 U/L (ref 38–126)
ALT SERPL W P-5'-P-CCNC: 27 U/L (ref 9–52)
ANION GAP SERPL CALCULATED.3IONS-SCNC: 13 MMOL/L (ref 5–15)
AST SERPL-CCNC: 23 U/L (ref 14–36)
BASOPHILS # BLD AUTO: 0.01 10*3/MM3 (ref 0–0.2)
BASOPHILS NFR BLD AUTO: 0.2 % (ref 0–2)
BILIRUB SERPL-MCNC: 0.6 MG/DL (ref 0.2–1.3)
BUN BLD-MCNC: 15 MG/DL (ref 7–21)
BUN/CREAT SERPL: 21.4 (ref 7–25)
CALCIUM SPEC-SCNC: 9.6 MG/DL (ref 8.4–10.2)
CHLORIDE SERPL-SCNC: 103 MMOL/L (ref 95–110)
CO2 SERPL-SCNC: 26 MMOL/L (ref 22–31)
CREAT BLD-MCNC: 0.7 MG/DL (ref 0.5–1)
DEPRECATED RDW RBC AUTO: 41.8 FL (ref 36.4–46.3)
EOSINOPHIL # BLD AUTO: 0.1 10*3/MM3 (ref 0–0.7)
EOSINOPHIL NFR BLD AUTO: 1.8 % (ref 0–7)
ERYTHROCYTE [DISTWIDTH] IN BLOOD BY AUTOMATED COUNT: 13.3 % (ref 11.5–14.5)
GFR SERPL CREATININE-BSD FRML MDRD: 85 ML/MIN/1.73 (ref 60–104)
GLOBULIN UR ELPH-MCNC: 3 GM/DL (ref 2.3–3.5)
GLUCOSE BLD-MCNC: 85 MG/DL (ref 60–100)
HCT VFR BLD AUTO: 37.9 % (ref 35–45)
HGB BLD-MCNC: 12.9 G/DL (ref 12–15.5)
IMM GRANULOCYTES # BLD: 0.01 10*3/MM3 (ref 0–0.02)
IMM GRANULOCYTES NFR BLD: 0.2 % (ref 0–0.5)
LYMPHOCYTES # BLD AUTO: 1.76 10*3/MM3 (ref 0.6–4.2)
LYMPHOCYTES NFR BLD AUTO: 32.5 % (ref 10–50)
MCH RBC QN AUTO: 29.2 PG (ref 26.5–34)
MCHC RBC AUTO-ENTMCNC: 34 G/DL (ref 31.4–36)
MCV RBC AUTO: 85.7 FL (ref 80–98)
MONOCYTES # BLD AUTO: 0.41 10*3/MM3 (ref 0–0.9)
MONOCYTES NFR BLD AUTO: 7.6 % (ref 0–12)
NEUTROPHILS # BLD AUTO: 3.13 10*3/MM3 (ref 2–8.6)
NEUTROPHILS NFR BLD AUTO: 57.7 % (ref 37–80)
NRBC BLD MANUAL-RTO: 0 /100 WBC (ref 0–0)
PLATELET # BLD AUTO: 213 10*3/MM3 (ref 150–450)
PMV BLD AUTO: 10 FL (ref 8–12)
POTASSIUM BLD-SCNC: 4 MMOL/L (ref 3.5–5.1)
PROT SERPL-MCNC: 7.1 G/DL (ref 6.3–8.6)
RBC # BLD AUTO: 4.42 10*6/MM3 (ref 3.77–5.16)
SODIUM BLD-SCNC: 142 MMOL/L (ref 137–145)
WBC NRBC COR # BLD: 5.42 10*3/MM3 (ref 3.2–9.8)

## 2018-02-19 PROCEDURE — 85025 COMPLETE CBC W/AUTO DIFF WBC: CPT

## 2018-02-19 PROCEDURE — G0463 HOSPITAL OUTPT CLINIC VISIT: HCPCS | Performed by: NURSE PRACTITIONER

## 2018-02-19 PROCEDURE — 99214 OFFICE O/P EST MOD 30 MIN: CPT | Performed by: NURSE PRACTITIONER

## 2018-02-19 PROCEDURE — 80053 COMPREHEN METABOLIC PANEL: CPT

## 2018-02-19 NOTE — PROGRESS NOTES
DATE OF VISIT: 2/19/2018    REASON FOR VISIT:  History of right breast cancer    HISTORY OF PRESENT ILLNESS:  63-year-old female with a past medical history significant for right-sided breast cancer, stage III diagnosed in 2008, status post mastectomy followed by chemotherapy and radiation.  Currently on Arimidex since January 2013 is here for follow-up visit today.  She is scheduled to see Dr. Leigh this week to discuss possible gall bladder surgery. She was referred to him by GI after workup for RUQ pain showed possible gall stone. Denies any blood in the stool or urine.  Denies any fever or chills or night sweats.    PAST MEDICAL HISTORY:    Past Medical History:   Diagnosis Date   • Acquired equinus deformity of foot     ANKLE   • Anxiety    • Breast cancer    • Cancer     BREAST   • Depression    • Diverticular disease of colon    • Drug therapy    • Esophagitis    • Fibrocystic breast    • GERD (gastroesophageal reflux disease)    • History of bone density study 01/01/2012    NORMAL   • History of echocardiogram 07/11/2016    Normal LV systolic function.Ef of 55-60%.Grade 1 diastolic dysfunciton of the LV myocardium.Mild left atiral enlargement.No evidence of pericardial effusion   • History of mammogram 07/18/2011    one breast (Benign finding.)   • History of Papanicolaou smear of cervix 06/30/2011    NEGATIVE   • Hx of radiation therapy    • Hyperlipidemia    • Injury of head and neck    • Minor head injury    • Personal history of malignant neoplasm of breast    • Plantar fasciitis    • Primary fibromyalgia syndrome    • Solitary pulmonary nodule present on computed tomography of lung        SOCIAL HISTORY:    Social History   Substance Use Topics   • Smoking status: Former Smoker   • Smokeless tobacco: Never Used      Comment: Ceased Smoking 12 Years Prior   • Alcohol use No       Surgical History :  Past Surgical History:   Procedure Laterality Date   • BREAST SURGERY      Carcinoma of the right  "breast;Mastectomy   •  SECTION     • COLONOSCOPY  2016    Normal colon.No specimens collected   • DIAGNOSTIC LAPAROSCOPY  1977    (Amenorrhea, probable polycystic ovaries. Polycystic ovaries   • ESOPHAGOSCOPY / EGD  2016    Mildly severe esophagitis.Gastritis.Normal examined duodenum.Medium sized hiatus hernia   • EXCISION BREAST LESION W/ PREOP NEEDLE LOC  1987    Bilateral excision of breast masses. Bilateral breast masses; probable fibrocystic disease.   • HYSTEROSCOPY  2011    Exam under anesthesia, diagnostic hysterectomy with fractional dilation and curettage. Thickened endometrial stripe, on Tamoxifen therapy.   • MASTECTOMY     • OTHER SURGICAL HISTORY  2016    NEEDLE BIOPSY LYMPH NODES; Ultrasound directed core needle biopsy of the left axilla.   • TUBAL ABDOMINAL LIGATION         ALLERGIES:    Allergies   Allergen Reactions   • Erythromycin    • Phenergan [Promethazine Hcl]    • Promethazine    • Propofol      COUGH/WHEEZE   • Tetracyclines & Related        REVIEW OF SYSTEMS:      CONSTITUTIONAL:  No fever, chills, or night sweats.     HEENT:  No epistaxis, mouth sores, or difficulty swallowing.    RESPIRATORY:  No new shortness of breath or cough at present.    CARDIOVASCULAR:  No chest pain or palpitations.    GASTROINTESTINAL:  No abdominal pain, nausea, vomiting, or blood in the stool.    GENITOURINARY:  No dysuria or hematuria.    MUSCULOSKELETAL:  No any new back pain or arthralgias.     NEUROLOGICAL:  No tingling or numbness. No new headache or dizziness.     LYMPHATICS:  Denies any abnormal swollen and anywhere in the body.    SKIN:  Denies any new skin rash.    PHYSICAL EXAMINATION:      VITAL SIGNS:  /78  Pulse 63  Temp 98.3 °F (36.8 °C) (Temporal Artery )   Resp 18  Ht 167.6 cm (65.98\")  Wt 81.1 kg (178 lb 12.7 oz)  BMI 28.87 kg/m2    GENERAL:  Not in any distress.    HEENT:  Normocephalic, Atraumatic.Mild Conjunctival pallor. No icterus.  " No Facial Asymmetry noted.    NECK:  No adenopathy. No JVD.    RESPIRATORY:  Fair air entry bilateral. No rhonchi or wheezing.    CARDIOVASCULAR:  S1, S2. Regular rate and rhythm. No murmur or gallop appreciated.    ABDOMEN:  Soft, obese, nontender. Bowel sounds present in all four quadrants.  No organomegaly appreciated.    EXTREMITIES:  No edema.No Calf Tenderness.    NEUROLOGIC:  Alert, awake and oriented ×3.    SKIN : No new skin lesion identified  Breast; left breast examined no masses or lesions palpated, no axillary adenopathy    DIAGNOSTIC DATA:    Glucose   Date Value Ref Range Status   02/19/2018 85 60 - 100 mg/dL Final     Sodium   Date Value Ref Range Status   02/19/2018 142 137 - 145 mmol/L Final     Potassium   Date Value Ref Range Status   02/19/2018 4.0 3.5 - 5.1 mmol/L Final     CO2   Date Value Ref Range Status   02/19/2018 26.0 22.0 - 31.0 mmol/L Final     Chloride   Date Value Ref Range Status   02/19/2018 103 95 - 110 mmol/L Final     Anion Gap   Date Value Ref Range Status   02/19/2018 13.0 5.0 - 15.0 mmol/L Final     Creatinine   Date Value Ref Range Status   02/19/2018 0.70 0.50 - 1.00 mg/dL Final     BUN   Date Value Ref Range Status   02/19/2018 15 7 - 21 mg/dL Final     BUN/Creatinine Ratio   Date Value Ref Range Status   02/19/2018 21.4 7.0 - 25.0 Final     Calcium   Date Value Ref Range Status   02/19/2018 9.6 8.4 - 10.2 mg/dL Final     eGFR Non  Amer   Date Value Ref Range Status   02/19/2018 85 >60 mL/min/1.73 Final     Alkaline Phosphatase   Date Value Ref Range Status   02/19/2018 122 38 - 126 U/L Final     Total Protein   Date Value Ref Range Status   02/19/2018 7.1 6.3 - 8.6 g/dL Final     ALT (SGPT)   Date Value Ref Range Status   02/19/2018 27 9 - 52 U/L Final     AST (SGOT)   Date Value Ref Range Status   02/19/2018 23 14 - 36 U/L Final     Total Bilirubin   Date Value Ref Range Status   02/19/2018 0.6 0.2 - 1.3 mg/dL Final     Albumin   Date Value Ref Range Status    02/19/2018 4.10 3.40 - 4.80 g/dL Final     Globulin   Date Value Ref Range Status   02/19/2018 3.0 2.3 - 3.5 gm/dL Final     A/G Ratio   Date Value Ref Range Status   02/19/2018 1.4 1.1 - 1.8 g/dL Final     Lab Results   Component Value Date    WBC 5.42 02/19/2018    HGB 12.9 02/19/2018    HCT 37.9 02/19/2018    MCV 85.7 02/19/2018     02/19/2018     Lab Results   Component Value Date    NEUTROABS 3.13 02/19/2018    IRON 42 11/23/2016    TIBC 338 11/23/2016    LABIRON 12.4 (L) 11/23/2016     Lab Results   Component Value Date    LABCA2 22.9 01/10/2017   ]        RADIOLOGY DATA : DEXA scan 10/2017:    IMPRESSION:  CONCLUSION: The patient has normal bone mineral density. In  comparison to prior study, the patient has experienced a negative  bone mineral density change of 0.4%.      Mammogram 3/10/2017:    IMPRESSION:  CONCLUSION:    No mammographic evidence of malignancy.  In the absence of  suspicious clinical or physical findings, routine follow-up  mammography is recommended in one year.     BIRADS Category 2: Benign findings    ASSESSMENT AND PLAN:      1. History of infiltrating ductal carcinoma of right breast, stage III, T2 N2, ER positive ER positive HER-2/ary negative by fish diagnosed on March 10, 2017.  Status post mastectomy on right side with lymph node dissection.  Patient received adjuvant chemotherapy in form of Adriamycin and Cytoxan followed by Taxol.  Subsequently patient received adjuvant radiation therapy .  Patient initially received tamoxifen from 2008 until January 2013.  After that in view of 4 lymph node being positive she was changed over to Arimidex in January 2013 plan is to continue it for 5 years until January 2018.  Mammogram of left breast done in March 2017 was within normal limit. At this time she will discontinue her Arimidex tablet as she has had maximum benefit of a total of 10 yrs of adjuvant hormonal therapy in addition to her chemotherapy and radiation therapy. Will  plan to see her back in one year. She is seeing Dr. Leigh on Thursday for an unrelated issue and will see if he order her mammogram, I informed her that if he does not she needs to call me and let me know that we can place order for mammogram that is due next month, She voices understanding.     2.  Chronic abdominal pain: has been deferred to GI clinic.     3.  Hypertension     4.  Health Maintenance: Patient does not smoke.  Her last colonoscopy was done in 2016.  Last        This document has been signed by DAVID Ferguson on February 19, 2018 1:09 PM

## 2018-02-21 ENCOUNTER — APPOINTMENT (OUTPATIENT)
Dept: PREADMISSION TESTING | Facility: HOSPITAL | Age: 63
End: 2018-02-21

## 2018-04-25 ENCOUNTER — APPOINTMENT (OUTPATIENT)
Dept: ONCOLOGY | Facility: HOSPITAL | Age: 63
End: 2018-04-25

## 2018-04-25 ENCOUNTER — OFFICE VISIT (OUTPATIENT)
Dept: ONCOLOGY | Facility: CLINIC | Age: 63
End: 2018-04-25

## 2018-04-25 VITALS
WEIGHT: 181.2 LBS | HEART RATE: 64 BPM | TEMPERATURE: 98.5 F | SYSTOLIC BLOOD PRESSURE: 151 MMHG | BODY MASS INDEX: 29.12 KG/M2 | RESPIRATION RATE: 18 BRPM | DIASTOLIC BLOOD PRESSURE: 75 MMHG | HEIGHT: 66 IN

## 2018-04-25 DIAGNOSIS — Z85.3 HISTORY OF BREAST CANCER: Primary | ICD-10-CM

## 2018-04-25 DIAGNOSIS — Z12.31 ENCOUNTER FOR SCREENING MAMMOGRAM FOR MALIGNANT NEOPLASM OF BREAST: ICD-10-CM

## 2018-04-25 PROCEDURE — G0463 HOSPITAL OUTPT CLINIC VISIT: HCPCS | Performed by: NURSE PRACTITIONER

## 2018-04-25 PROCEDURE — 99214 OFFICE O/P EST MOD 30 MIN: CPT | Performed by: NURSE PRACTITIONER

## 2018-04-25 RX ORDER — ANASTROZOLE 1 MG/1
1 TABLET ORAL DAILY
Refills: 3 | COMMUNITY
Start: 2018-04-15 | End: 2018-05-11

## 2018-04-25 RX ORDER — DEXLANSOPRAZOLE 60 MG/1
CAPSULE, DELAYED RELEASE ORAL
Refills: 3 | Status: ON HOLD | COMMUNITY
Start: 2018-04-15 | End: 2018-09-11

## 2018-04-25 RX ORDER — FLUTICASONE PROPIONATE 50 MCG
SPRAY, SUSPENSION (ML) NASAL
Refills: 3 | COMMUNITY
Start: 2018-04-06 | End: 2018-09-10

## 2018-04-25 NOTE — PATIENT INSTRUCTIONS
Mammogram  A mammogram is an X-ray of the breasts that is done to check for abnormal changes. This procedure can screen for and detect any changes that may suggest breast cancer. A mammogram can also identify other changes and variations in the breast, such as:  · Inflammation of the breast tissue (mastitis).  · An infected area that contains a collection of pus (abscess).  · A fluid-filled sac (cyst).  · Fibrocystic changes. This is when breast tissue becomes denser, which can make the tissue feel rope-like or uneven under the skin.  · Tumors that are not cancerous (benign).  Tell a health care provider about:  · Any allergies you have.  · If you have breast implants.  · If you have had previous breast disease, biopsy, or surgery.  · If you are breastfeeding.  · Any possibility that you could be pregnant, if this applies.  · If you are younger than age 25.  · If you have a family history of breast cancer.  What are the risks?  Generally, this is a safe procedure. However, problems may occur, including:  · Exposure to radiation. Radiation levels are very low with this test.  · The results being misinterpreted.  · The need for further tests.  · The inability of the mammogram to detect certain cancers.  What happens before the procedure?  · Schedule your test about 1-2 weeks after your menstrual period. This is usually when your breasts are the least tender.  · If you have had a mammogram done at a different facility in the past, get the mammogram X-rays or have them sent to your current exam facility in order to compare them.  · Wash your breasts and under your arms the day of the test.  · Do not wear deodorants, perfumes, lotions, or powders anywhere on your body on the day of the test.  · Remove any jewelry from your neck.  · Wear clothes that you can change into and out of easily.  What happens during the procedure?  · You will undress from the waist up and put on a gown.  · You will  front of the X-ray  machine.  · Each breast will be placed between two plastic or glass plates. The plates will compress your breast for a few seconds. Try to stay as relaxed as possible during the procedure. This does not cause any harm to your breasts and any discomfort you feel will be very brief.  · X-rays will be taken from different angles of each breast.  The procedure may vary among health care providers and hospitals.  What happens after the procedure?  · The mammogram will be examined by a specialist (radiologist).  · You may need to repeat certain parts of the test, depending on the quality of the images. This is commonly done if the radiologist needs a better view of the breast tissue.  · Ask when your test results will be ready. Make sure you get your test results.  · You may resume your normal activities.  This information is not intended to replace advice given to you by your health care provider. Make sure you discuss any questions you have with your health care provider.  Document Released: 12/15/2001 Document Revised: 05/22/2017 Document Reviewed: 02/26/2016  Elsevier Interactive Patient Education © 2017 [employer]

## 2018-04-25 NOTE — PROGRESS NOTES
DATE OF VISIT: 4/25/2018    REASON FOR VISIT:  History of right breast cancer     HISTORY OF PRESENT ILLNESS:  63-year-old female with a past medical history significant for right-sided breast cancer, stage III diagnosed in 2008, status post mastectomy followed by chemotherapy and radiation. She completed 10 yrs of adjuvant hormonal therapy with Tamoxifen followed by Arimidex. She stopped Arimidex in January 2018. She was last seen here in February 2018 and was placed on yearly follow-up. She was suppose to see Dr. Leigh last month with mammogram but appt was canceled.     Pt is here today for questionable area in her left breast. She states area in left upper quadrant is sore.    PAST MEDICAL HISTORY:    Past Medical History:   Diagnosis Date   • Acquired equinus deformity of foot     ANKLE   • Anxiety    • Breast cancer    • Cancer     BREAST   • Depression    • Diverticular disease of colon    • Drug therapy    • Esophagitis    • Fibrocystic breast    • GERD (gastroesophageal reflux disease)    • History of bone density study 01/01/2012    NORMAL   • History of echocardiogram 07/11/2016    Normal LV systolic function.Ef of 55-60%.Grade 1 diastolic dysfunciton of the LV myocardium.Mild left atiral enlargement.No evidence of pericardial effusion   • History of mammogram 07/18/2011    one breast (Benign finding.)   • History of Papanicolaou smear of cervix 06/30/2011    NEGATIVE   • Hx of radiation therapy    • Hyperlipidemia    • Injury of head and neck    • Minor head injury    • Personal history of malignant neoplasm of breast    • Plantar fasciitis    • Primary fibromyalgia syndrome    • Solitary pulmonary nodule present on computed tomography of lung        SOCIAL HISTORY:    Social History   Substance Use Topics   • Smoking status: Former Smoker   • Smokeless tobacco: Never Used      Comment: Ceased Smoking 12 Years Prior   • Alcohol use No       Surgical History :  Past Surgical History:   Procedure Laterality  "Date   • BREAST SURGERY      Carcinoma of the right breast;Mastectomy   •  SECTION     • COLONOSCOPY  2016    Normal colon.No specimens collected   • DIAGNOSTIC LAPAROSCOPY  1977    (Amenorrhea, probable polycystic ovaries. Polycystic ovaries   • ESOPHAGOSCOPY / EGD  2016    Mildly severe esophagitis.Gastritis.Normal examined duodenum.Medium sized hiatus hernia   • EXCISION BREAST LESION W/ PREOP NEEDLE LOC  1987    Bilateral excision of breast masses. Bilateral breast masses; probable fibrocystic disease.   • HYSTEROSCOPY  2011    Exam under anesthesia, diagnostic hysterectomy with fractional dilation and curettage. Thickened endometrial stripe, on Tamoxifen therapy.   • MASTECTOMY     • OTHER SURGICAL HISTORY  2016    NEEDLE BIOPSY LYMPH NODES; Ultrasound directed core needle biopsy of the left axilla.   • TUBAL ABDOMINAL LIGATION         ALLERGIES:    Allergies   Allergen Reactions   • Erythromycin    • Phenergan [Promethazine Hcl]    • Promethazine    • Propofol      COUGH/WHEEZE   • Tetracyclines & Related        REVIEW OF SYSTEMS:      CONSTITUTIONAL:  No fever, chills, or night sweats.     HEENT:  No epistaxis, mouth sores, or difficulty swallowing.    RESPIRATORY:  No new shortness of breath or cough at present.    CARDIOVASCULAR:  No chest pain or palpitations.    GASTROINTESTINAL:  No abdominal pain, nausea, vomiting, or blood in the stool.    GENITOURINARY:  No dysuria or hematuria.    MUSCULOSKELETAL:  No any new back pain or arthralgias.     NEUROLOGICAL:  No tingling or numbness. No new headache or dizziness.     LYMPHATICS:  Denies any abnormal swollen and anywhere in the body.    SKIN:  Denies any new skin rash.    PHYSICAL EXAMINATION:      VITAL SIGNS:  /75   Pulse 64   Temp 98.5 °F (36.9 °C) (Temporal Artery )   Resp 18   Ht 167.6 cm (65.98\")   Wt 82.2 kg (181 lb 3.2 oz)   BMI 29.26 kg/m²     GENERAL:  Not in any distress.    HEENT:  " Normocephalic, Atraumatic.Mild Conjunctival pallor. No icterus.  No Facial Asymmetry noted.    NECK:  No adenopathy. No JVD.    RESPIRATORY:  Fair air entry bilateral. No rhonchi or wheezing.    CARDIOVASCULAR:  S1, S2. Regular rate and rhythm. No murmur or gallop appreciated.    ABDOMEN:  Soft, obese, nontender. Bowel sounds present in all four quadrants.  No organomegaly appreciated.    EXTREMITIES:  No edema.No Calf Tenderness.    NEUROLOGIC:  Alert, awake and oriented ×3.  No  Motor or sensory deficit appreciated. Cranial Nerves 2-12 grossly intact.    SKIN : No new skin lesion identified    Breast: right mastectomy, no palpable chest wall concern, no axillary adenopathy; left breast examined, no palpable area in breast but pt does have tenderness in left upper outer quadrant, no axillary adenopathy and no nipple discharge.  DIAGNOSTIC DATA:    Glucose   Date Value Ref Range Status   02/19/2018 85 60 - 100 mg/dL Final     Sodium   Date Value Ref Range Status   02/19/2018 142 137 - 145 mmol/L Final     Potassium   Date Value Ref Range Status   02/19/2018 4.0 3.5 - 5.1 mmol/L Final     CO2   Date Value Ref Range Status   02/19/2018 26.0 22.0 - 31.0 mmol/L Final     Chloride   Date Value Ref Range Status   02/19/2018 103 95 - 110 mmol/L Final     Anion Gap   Date Value Ref Range Status   02/19/2018 13.0 5.0 - 15.0 mmol/L Final     Creatinine   Date Value Ref Range Status   02/19/2018 0.70 0.50 - 1.00 mg/dL Final     BUN   Date Value Ref Range Status   02/19/2018 15 7 - 21 mg/dL Final     BUN/Creatinine Ratio   Date Value Ref Range Status   02/19/2018 21.4 7.0 - 25.0 Final     Calcium   Date Value Ref Range Status   02/19/2018 9.6 8.4 - 10.2 mg/dL Final     eGFR Non  Amer   Date Value Ref Range Status   02/19/2018 85 >60 mL/min/1.73 Final     Alkaline Phosphatase   Date Value Ref Range Status   02/19/2018 122 38 - 126 U/L Final     Total Protein   Date Value Ref Range Status   02/19/2018 7.1 6.3 - 8.6 g/dL  Final     ALT (SGPT)   Date Value Ref Range Status   02/19/2018 27 9 - 52 U/L Final     AST (SGOT)   Date Value Ref Range Status   02/19/2018 23 14 - 36 U/L Final     Total Bilirubin   Date Value Ref Range Status   02/19/2018 0.6 0.2 - 1.3 mg/dL Final     Albumin   Date Value Ref Range Status   02/19/2018 4.10 3.40 - 4.80 g/dL Final     Globulin   Date Value Ref Range Status   02/19/2018 3.0 2.3 - 3.5 gm/dL Final     A/G Ratio   Date Value Ref Range Status   02/19/2018 1.4 1.1 - 1.8 g/dL Final     Lab Results   Component Value Date    WBC 5.42 02/19/2018    HGB 12.9 02/19/2018    HCT 37.9 02/19/2018    MCV 85.7 02/19/2018     02/19/2018     Lab Results   Component Value Date    NEUTROABS 3.13 02/19/2018    IRON 42 11/23/2016    TIBC 338 11/23/2016    LABIRON 12.4 (L) 11/23/2016     Lab Results   Component Value Date    LABCA2 22.9 01/10/2017   ]      RADIOLOGY DATA :     Ultrasound left breast September 2017:  IMPRESSION:  1.  Left breast subareolar benign fluid-filled ducts are seen.  2.  Otherwise negative ultrasound of the left breast.       Left breast mammogram March 2017:  IMPRESSION:  CONCLUSION:    No mammographic evidence of malignancy.  In the absence of  suspicious clinical or physical findings, routine follow-up  mammography is recommended in one year.     BIRADS Category 2: Benign findings        ASSESSMENT AND PLAN:    1.  History of infiltrating ductal carcinoma of right breast, stage III, T2 N2, ER positive ER positive HER-2/ary negative by fish diagnosed on March 10, 2017.  Status post mastectomy on right side with lymph node dissection.  Patient received adjuvant chemotherapy in form of Adriamycin and Cytoxan followed by Taxol.  Subsequently patient received adjuvant radiation therapy .  Patient initially received tamoxifen from 2008 until January 2013.  After that in view of 4 lymph node being positive she was changed over to Arimidex in January 2013 plan is to continue it for 5 years until  January 2018.  Mammogram of left breast done in March 2017 was within normal limit. She presents today with new soreness in left breast, she is past due for mammogram of left breast; will place order for this to be done. She will keep her next follow-up appointment with us.     2. Hypertension, /75    3. Health maintenance, she does not smoke and colonoscopy was in 2016.            This document has been signed by DAVID Ferguson on April 25, 2018 12:51 PM

## 2018-05-11 RX ORDER — ANASTROZOLE 1 MG/1
TABLET ORAL
Qty: 30 TABLET | Refills: 3 | OUTPATIENT
Start: 2018-05-11

## 2018-09-03 ENCOUNTER — HOSPITAL ENCOUNTER (EMERGENCY)
Facility: HOSPITAL | Age: 63
Discharge: HOME | End: 2018-09-03
Attending: EMERGENCY MEDICINE | Admitting: EMERGENCY MEDICINE

## 2018-09-03 ENCOUNTER — APPOINTMENT (OUTPATIENT)
Dept: ULTRASOUND IMAGING | Facility: HOSPITAL | Age: 63
End: 2018-09-03

## 2018-09-03 ENCOUNTER — APPOINTMENT (OUTPATIENT)
Dept: GENERAL RADIOLOGY | Facility: HOSPITAL | Age: 63
End: 2018-09-03

## 2018-09-03 VITALS
RESPIRATION RATE: 18 BRPM | HEART RATE: 65 BPM | BODY MASS INDEX: 28.12 KG/M2 | WEIGHT: 175 LBS | DIASTOLIC BLOOD PRESSURE: 74 MMHG | OXYGEN SATURATION: 95 % | HEIGHT: 66 IN | TEMPERATURE: 97.8 F | SYSTOLIC BLOOD PRESSURE: 176 MMHG

## 2018-09-03 DIAGNOSIS — R79.89 ELEVATED LFTS: ICD-10-CM

## 2018-09-03 DIAGNOSIS — K80.50 BILIARY COLIC: Primary | ICD-10-CM

## 2018-09-03 DIAGNOSIS — I10 ESSENTIAL HYPERTENSION: ICD-10-CM

## 2018-09-03 LAB
ALBUMIN SERPL-MCNC: 4 G/DL (ref 3.4–4.8)
ALBUMIN/GLOB SERPL: 1.3 G/DL (ref 1.1–1.8)
ALP SERPL-CCNC: 115 U/L (ref 38–126)
ALT SERPL W P-5'-P-CCNC: 53 U/L (ref 9–52)
AMYLASE SERPL-CCNC: 72 U/L (ref 50–130)
ANION GAP SERPL CALCULATED.3IONS-SCNC: 10 MMOL/L (ref 5–15)
AST SERPL-CCNC: 84 U/L (ref 14–36)
BACTERIA UR QL AUTO: ABNORMAL /HPF
BASOPHILS # BLD AUTO: 0.02 10*3/MM3 (ref 0–0.2)
BASOPHILS NFR BLD AUTO: 0.2 % (ref 0–2)
BILIRUB SERPL-MCNC: 0.7 MG/DL (ref 0.2–1.3)
BILIRUB UR QL STRIP: NEGATIVE
BUN BLD-MCNC: 20 MG/DL (ref 7–21)
BUN/CREAT SERPL: 23.5 (ref 7–25)
CALCIUM SPEC-SCNC: 9.8 MG/DL (ref 8.4–10.2)
CHLORIDE SERPL-SCNC: 103 MMOL/L (ref 95–110)
CLARITY UR: ABNORMAL
CO2 SERPL-SCNC: 28 MMOL/L (ref 22–31)
COLOR UR: YELLOW
CREAT BLD-MCNC: 0.85 MG/DL (ref 0.5–1)
DEPRECATED RDW RBC AUTO: 43.1 FL (ref 36.4–46.3)
EOSINOPHIL # BLD AUTO: 0.19 10*3/MM3 (ref 0–0.7)
EOSINOPHIL NFR BLD AUTO: 2.1 % (ref 0–7)
ERYTHROCYTE [DISTWIDTH] IN BLOOD BY AUTOMATED COUNT: 13.7 % (ref 11.5–14.5)
GFR SERPL CREATININE-BSD FRML MDRD: 68 ML/MIN/1.73 (ref 45–104)
GLOBULIN UR ELPH-MCNC: 3.2 GM/DL (ref 2.3–3.5)
GLUCOSE BLD-MCNC: 142 MG/DL (ref 60–100)
GLUCOSE UR STRIP-MCNC: NEGATIVE MG/DL
HCT VFR BLD AUTO: 40.1 % (ref 35–45)
HGB BLD-MCNC: 13.9 G/DL (ref 12–15.5)
HGB UR QL STRIP.AUTO: ABNORMAL
HYALINE CASTS UR QL AUTO: ABNORMAL /LPF
IMM GRANULOCYTES # BLD: 0.03 10*3/MM3 (ref 0–0.02)
IMM GRANULOCYTES NFR BLD: 0.3 % (ref 0–0.5)
KETONES UR QL STRIP: NEGATIVE
LEUKOCYTE ESTERASE UR QL STRIP.AUTO: NEGATIVE
LIPASE SERPL-CCNC: 147 U/L (ref 23–300)
LYMPHOCYTES # BLD AUTO: 1.7 10*3/MM3 (ref 0.6–4.2)
LYMPHOCYTES NFR BLD AUTO: 18.8 % (ref 10–50)
MCH RBC QN AUTO: 30 PG (ref 26.5–34)
MCHC RBC AUTO-ENTMCNC: 34.7 G/DL (ref 31.4–36)
MCV RBC AUTO: 86.4 FL (ref 80–98)
MONOCYTES # BLD AUTO: 0.7 10*3/MM3 (ref 0–0.9)
MONOCYTES NFR BLD AUTO: 7.8 % (ref 0–12)
NEUTROPHILS # BLD AUTO: 6.38 10*3/MM3 (ref 2–8.6)
NEUTROPHILS NFR BLD AUTO: 70.8 % (ref 37–80)
NITRITE UR QL STRIP: NEGATIVE
PH UR STRIP.AUTO: 6.5 [PH] (ref 5–9)
PLATELET # BLD AUTO: 220 10*3/MM3 (ref 150–450)
PMV BLD AUTO: 10.3 FL (ref 8–12)
POTASSIUM BLD-SCNC: 3.8 MMOL/L (ref 3.5–5.1)
PROT SERPL-MCNC: 7.2 G/DL (ref 6.3–8.6)
PROT UR QL STRIP: NEGATIVE
RBC # BLD AUTO: 4.64 10*6/MM3 (ref 3.77–5.16)
RBC # UR: ABNORMAL /HPF
REF LAB TEST METHOD: ABNORMAL
SODIUM BLD-SCNC: 141 MMOL/L (ref 137–145)
SP GR UR STRIP: 1.02 (ref 1–1.03)
SQUAMOUS #/AREA URNS HPF: ABNORMAL /HPF
UROBILINOGEN UR QL STRIP: ABNORMAL
WBC NRBC COR # BLD: 9.02 10*3/MM3 (ref 3.2–9.8)
WBC UR QL AUTO: ABNORMAL /HPF

## 2018-09-03 PROCEDURE — 96374 THER/PROPH/DIAG INJ IV PUSH: CPT

## 2018-09-03 PROCEDURE — 81001 URINALYSIS AUTO W/SCOPE: CPT | Performed by: EMERGENCY MEDICINE

## 2018-09-03 PROCEDURE — 93005 ELECTROCARDIOGRAM TRACING: CPT | Performed by: EMERGENCY MEDICINE

## 2018-09-03 PROCEDURE — 80053 COMPREHEN METABOLIC PANEL: CPT | Performed by: EMERGENCY MEDICINE

## 2018-09-03 PROCEDURE — 93005 ELECTROCARDIOGRAM TRACING: CPT

## 2018-09-03 PROCEDURE — 76705 ECHO EXAM OF ABDOMEN: CPT

## 2018-09-03 PROCEDURE — 83690 ASSAY OF LIPASE: CPT | Performed by: EMERGENCY MEDICINE

## 2018-09-03 PROCEDURE — 99284 EMERGENCY DEPT VISIT MOD MDM: CPT

## 2018-09-03 PROCEDURE — 96375 TX/PRO/DX INJ NEW DRUG ADDON: CPT

## 2018-09-03 PROCEDURE — 82150 ASSAY OF AMYLASE: CPT | Performed by: EMERGENCY MEDICINE

## 2018-09-03 PROCEDURE — 93010 ELECTROCARDIOGRAM REPORT: CPT | Performed by: INTERNAL MEDICINE

## 2018-09-03 PROCEDURE — 74022 RADEX COMPL AQT ABD SERIES: CPT

## 2018-09-03 PROCEDURE — 85025 COMPLETE CBC W/AUTO DIFF WBC: CPT | Performed by: EMERGENCY MEDICINE

## 2018-09-03 PROCEDURE — 96361 HYDRATE IV INFUSION ADD-ON: CPT

## 2018-09-03 RX ORDER — SODIUM CHLORIDE 0.9 % (FLUSH) 0.9 %
10 SYRINGE (ML) INJECTION AS NEEDED
Status: DISCONTINUED | OUTPATIENT
Start: 2018-09-03 | End: 2018-09-03 | Stop reason: HOSPADM

## 2018-09-03 RX ORDER — SODIUM CHLORIDE 9 MG/ML
125 INJECTION, SOLUTION INTRAVENOUS CONTINUOUS
Status: DISCONTINUED | OUTPATIENT
Start: 2018-09-03 | End: 2018-09-03 | Stop reason: HOSPADM

## 2018-09-03 RX ORDER — KETOROLAC TROMETHAMINE 30 MG/ML
30 INJECTION, SOLUTION INTRAMUSCULAR; INTRAVENOUS ONCE
Status: DISCONTINUED | OUTPATIENT
Start: 2018-09-03 | End: 2018-09-03 | Stop reason: HOSPADM

## 2018-09-03 RX ORDER — ONDANSETRON 4 MG/1
4 TABLET, ORALLY DISINTEGRATING ORAL EVERY 8 HOURS PRN
Qty: 10 TABLET | Refills: 0 | Status: SHIPPED | OUTPATIENT
Start: 2018-09-03 | End: 2019-02-19

## 2018-09-03 RX ORDER — NAPROXEN SODIUM 550 MG/1
550 TABLET ORAL 2 TIMES DAILY PRN
Qty: 20 TABLET | Refills: 0 | Status: SHIPPED | OUTPATIENT
Start: 2018-09-03 | End: 2018-09-10

## 2018-09-03 RX ORDER — FAMOTIDINE 10 MG/ML
20 INJECTION, SOLUTION INTRAVENOUS ONCE
Status: COMPLETED | OUTPATIENT
Start: 2018-09-03 | End: 2018-09-03

## 2018-09-03 RX ORDER — ONDANSETRON 2 MG/ML
4 INJECTION INTRAMUSCULAR; INTRAVENOUS ONCE
Status: DISCONTINUED | OUTPATIENT
Start: 2018-09-03 | End: 2018-09-03 | Stop reason: HOSPADM

## 2018-09-03 RX ADMIN — Medication 10 ML: at 03:22

## 2018-09-03 RX ADMIN — SODIUM CHLORIDE 125 ML/HR: 9 INJECTION, SOLUTION INTRAVENOUS at 03:21

## 2018-09-03 RX ADMIN — FAMOTIDINE 20 MG: 10 INJECTION INTRAVENOUS at 03:21

## 2018-09-03 NOTE — DISCHARGE INSTRUCTIONS
Clear liquid diet x24hrs  Avoid fatty foods  Return ED fever, jaundice, vomiting, dehydration, abdominal pain, bleeding, worse condition, other concerns

## 2018-09-03 NOTE — ED PROVIDER NOTES
Subjective   64yo female pmh significant breast ca/htn/cholelithiasis presents ED c/o acute onset postprandial RUQ abdominal pain after eating eggs/negrete.  Pt reports pain radiation right scapula, upper back/associated nausea, lasting approx 1.5hrs with resolution.  ROS neg fever/chills/chest pain/soa/vomiting/dysuria.  ROS (+) diarrhea.        History provided by:  Patient  Abdominal Pain   Pain location:  RUQ  Pain quality: aching and sharp    Pain radiates to:  Back  Pain severity:  Severe  Onset quality:  Sudden  Progression:  Resolved  Chronicity:  New  Associated symptoms: diarrhea and nausea    Associated symptoms: no vomiting        Review of Systems   Constitutional: Negative.    HENT: Negative.    Respiratory: Negative.    Cardiovascular: Negative.    Gastrointestinal: Positive for abdominal pain, diarrhea and nausea. Negative for blood in stool and vomiting.   Genitourinary: Negative.    Musculoskeletal: Negative.    Skin: Negative.    All other systems reviewed and are negative.      Past Medical History:   Diagnosis Date   • Acquired equinus deformity of foot     ANKLE   • Anxiety    • Breast cancer (CMS/HCC)    • Cancer (CMS/HCC)     BREAST   • Depression    • Diverticular disease of colon    • Drug therapy    • Esophagitis    • Fibrocystic breast    • GERD (gastroesophageal reflux disease)    • History of bone density study 01/01/2012    NORMAL   • History of echocardiogram 07/11/2016    Normal LV systolic function.Ef of 55-60%.Grade 1 diastolic dysfunciton of the LV myocardium.Mild left atiral enlargement.No evidence of pericardial effusion   • History of mammogram 07/18/2011    one breast (Benign finding.)   • History of Papanicolaou smear of cervix 06/30/2011    NEGATIVE   • Hx of radiation therapy    • Hyperlipidemia    • Injury of head and neck    • Minor head injury    • Personal history of malignant neoplasm of breast    • Plantar fasciitis    • Primary fibromyalgia syndrome    • Solitary  pulmonary nodule present on computed tomography of lung        Allergies   Allergen Reactions   • Erythromycin    • Phenergan [Promethazine Hcl]    • Promethazine    • Propofol      COUGH/WHEEZE   • Tetracyclines & Related        Past Surgical History:   Procedure Laterality Date   • BREAST SURGERY      Carcinoma of the right breast;Mastectomy   •  SECTION     • COLONOSCOPY  2016    Normal colon.No specimens collected   • DIAGNOSTIC LAPAROSCOPY  1977    (Amenorrhea, probable polycystic ovaries. Polycystic ovaries   • ESOPHAGOSCOPY / EGD  2016    Mildly severe esophagitis.Gastritis.Normal examined duodenum.Medium sized hiatus hernia   • EXCISION BREAST LESION W/ PREOP NEEDLE LOC  1987    Bilateral excision of breast masses. Bilateral breast masses; probable fibrocystic disease.   • HYSTEROSCOPY  2011    Exam under anesthesia, diagnostic hysterectomy with fractional dilation and curettage. Thickened endometrial stripe, on Tamoxifen therapy.   • MASTECTOMY     • OTHER SURGICAL HISTORY  2016    NEEDLE BIOPSY LYMPH NODES; Ultrasound directed core needle biopsy of the left axilla.   • TUBAL ABDOMINAL LIGATION         Family History   Problem Relation Age of Onset   • Diabetes Other    • Arthritis Other    • Breast cancer Maternal Aunt        Social History     Social History   • Marital status: Single     Social History Main Topics   • Smoking status: Former Smoker   • Smokeless tobacco: Never Used      Comment: Ceased Smoking 12 Years Prior   • Alcohol use No   • Drug use: No   • Sexual activity: Defer     Other Topics Concern   • Not on file           Objective   Physical Exam   Constitutional: She is oriented to person, place, and time. She appears well-developed and well-nourished.   HENT:   Head: Normocephalic and atraumatic.   Mouth/Throat: Oropharynx is clear and moist.   Eyes: Pupils are equal, round, and reactive to light.   Neck: Neck supple. No JVD present. No  tracheal deviation present.   Cardiovascular: Normal rate, regular rhythm, normal heart sounds and intact distal pulses.  Exam reveals no gallop and no friction rub.    No murmur heard.  Pulmonary/Chest: Effort normal and breath sounds normal. She has no wheezes. She has no rales.   Abdominal: Soft. Normal appearance and bowel sounds are normal. There is tenderness in the right upper quadrant and epigastric area. There is no rigidity, no rebound, no guarding, no tenderness at McBurney's point and negative Kirk's sign.       Musculoskeletal: She exhibits no edema.   Lymphadenopathy:     She has no cervical adenopathy.   Neurological: She is alert and oriented to person, place, and time.   Nursing note and vitals reviewed.      ECG 12 Lead    Date/Time: 9/3/2018 3:21 AM  Performed by: TENZIN DUMONT  Authorized by: TENZIN DUMONT   Interpreted by physician  Rhythm: sinus rhythm  Rate: normal  BPM: 68  QRS axis: normal  Conduction: conduction normal  ST Segments: ST segments normal  T Waves: T waves normal  Other: no other findings  Clinical impression: normal ECG                 ED Course  ED Course as of Sep 03 0439   Mon Sep 03, 2018   0437 Pt denies abdominal pain at this time. Neg kirk sign. Afebrile/neg leukocytosis/sirs. No clinical evidence cholecystitis. Pt offered admission et declined. Pt recommended outpatient HIDA scan et prompt surgical consult for cholecystectomy. Precautions provided. Stable discharge.  [SD]      ED Course User Index  [SD] Tenzin Dumont MD      Labs Reviewed   COMPREHENSIVE METABOLIC PANEL - Abnormal; Notable for the following:        Result Value    Glucose 142 (*)     ALT (SGPT) 53 (*)     AST (SGOT) 84 (*)     All other components within normal limits   URINALYSIS W/ MICROSCOPIC IF INDICATED (NO CULTURE) - Abnormal; Notable for the following:     Appearance, UA Cloudy (*)     Blood, UA Trace (*)     All other components within normal limits   CBC WITH AUTO DIFFERENTIAL -  Abnormal; Notable for the following:     Immature Grans, Absolute 0.03 (*)     All other components within normal limits   URINALYSIS, MICROSCOPIC ONLY - Abnormal; Notable for the following:     RBC, UA 3-5 (*)     Squamous Epithelial Cells, UA 6-12 (*)     All other components within normal limits   AMYLASE - Normal   LIPASE - Normal   CBC AND DIFFERENTIAL    Narrative:     The following orders were created for panel order CBC & Differential.  Procedure                               Abnormality         Status                     ---------                               -----------         ------                     CBC Auto Differential[[id number]]        Abnormal            Final result                 Please view results for these tests on the individual orders.     Xr Abdomen 2 View With Chest 1 View    Result Date: 9/3/2018  Narrative: Acute abdominal series on 9/3/2018 CLINICAL INDICATION: Right upper quadrant pain COMPARISON: Chest x-ray from 6/17/2017 FINDINGS: CHEST: The lungs are clear. Cardiac, hilar and mediastinal contours are within normal limits. Pulmonary vascularity is within normal limits. ABDOMEN: There is no free air. Calcifications in the pelvis are consistent with phleboliths. No other abnormal calcification or mass effect is noted. Bowel gas pattern is unremarkable. Mild degenerative changes are noted in the lower lumbar spine.     Impression: 1. No acute cardiopulmonary disease. 2. Nonspecific abdomen. Electronically signed by:  Thuan Barney  9/3/2018 3:51 AM CDT Workstation: [id number]    Us Gallbladder    Result Date: 9/3/2018  Narrative: Ultrasound gallbladder on 9/3/2018 CLINICAL INDICATION: Generalized abdominal pain COMPARISON: CT from 11/12/2015 FINDINGS: Multiple sonographic images are obtained throughout the right upper quadrant, both transverse and sagittal images are obtained. Visualized pancreas is unremarkable. Visualized liver is homogeneous without focal lesion or  evidence of intrahepatic biliary ductal dilatation. Visualized hepatic vasculature is patent and with a normal directional flow. Right kidney shows no hydronephrosis. There is a moderate size echogenic focus with posterior shadowing in the gallbladder consistent with gallstone. Borderline gallbladder wall thickening is noted. No pericholecystic fluid is noted. The common duct measures 6 mm which is within normal limits mitigating against obstruction of the biliary tree.     Impression: Cholelithiasis with borderline gallbladder wall thickening. If there is high clinical concern for acute cholecystitis consider follow-up hepatobiliary scan. Electronically signed by:  Thuan Barney  9/3/2018 4:21 AM CDT Workstation: [id number]                MDM      Final diagnoses:   Biliary colic   Elevated LFTs   Essential hypertension            Henri Mariee MD  09/03/18 0439

## 2018-09-10 ENCOUNTER — APPOINTMENT (OUTPATIENT)
Dept: PREADMISSION TESTING | Facility: HOSPITAL | Age: 63
End: 2018-09-10

## 2018-09-10 ENCOUNTER — OFFICE VISIT (OUTPATIENT)
Dept: SURGERY | Facility: CLINIC | Age: 63
End: 2018-09-10

## 2018-09-10 ENCOUNTER — ANESTHESIA EVENT (OUTPATIENT)
Dept: PERIOP | Facility: HOSPITAL | Age: 63
End: 2018-09-10

## 2018-09-10 VITALS
SYSTOLIC BLOOD PRESSURE: 122 MMHG | HEART RATE: 92 BPM | WEIGHT: 178.6 LBS | HEIGHT: 66 IN | TEMPERATURE: 97.7 F | BODY MASS INDEX: 28.7 KG/M2 | DIASTOLIC BLOOD PRESSURE: 80 MMHG

## 2018-09-10 DIAGNOSIS — K80.00 GALLSTONES AND INFLAMMATION OF GALLBLADDER WITHOUT OBSTRUCTION: Primary | ICD-10-CM

## 2018-09-10 PROCEDURE — 99214 OFFICE O/P EST MOD 30 MIN: CPT | Performed by: SURGERY

## 2018-09-10 RX ORDER — BUSPIRONE HYDROCHLORIDE 5 MG/1
2.5 TABLET ORAL NIGHTLY
COMMUNITY
End: 2020-10-21

## 2018-09-10 RX ORDER — NITROFURANTOIN MACROCRYSTALS 100 MG/1
100 CAPSULE ORAL DAILY
COMMUNITY
End: 2018-09-10

## 2018-09-10 RX ORDER — FLUTICASONE PROPIONATE 50 MCG
2 SPRAY, SUSPENSION (ML) NASAL DAILY PRN
COMMUNITY
End: 2023-02-15

## 2018-09-10 RX ORDER — NITROFURANTOIN MACROCRYSTALS 100 MG/1
100 CAPSULE ORAL DAILY
COMMUNITY
End: 2019-02-27

## 2018-09-10 RX ORDER — BUPIVACAINE HCL/0.9 % NACL/PF 0.1 %
2 PLASTIC BAG, INJECTION (ML) EPIDURAL ONCE
Status: CANCELLED | OUTPATIENT
Start: 2018-09-11 | End: 2018-09-11

## 2018-09-10 RX ORDER — SODIUM CHLORIDE, SODIUM LACTATE, POTASSIUM CHLORIDE, CALCIUM CHLORIDE 600; 310; 30; 20 MG/100ML; MG/100ML; MG/100ML; MG/100ML
100 INJECTION, SOLUTION INTRAVENOUS CONTINUOUS
Status: CANCELLED | OUTPATIENT
Start: 2018-09-11

## 2018-09-10 RX ORDER — DEXLANSOPRAZOLE 60 MG/1
60 CAPSULE, DELAYED RELEASE ORAL NIGHTLY
COMMUNITY
End: 2019-01-28 | Stop reason: SDUPTHER

## 2018-09-10 NOTE — PATIENT INSTRUCTIONS

## 2018-09-11 ENCOUNTER — APPOINTMENT (OUTPATIENT)
Dept: GENERAL RADIOLOGY | Facility: HOSPITAL | Age: 63
End: 2018-09-11

## 2018-09-11 ENCOUNTER — ANESTHESIA (OUTPATIENT)
Dept: PERIOP | Facility: HOSPITAL | Age: 63
End: 2018-09-11

## 2018-09-11 ENCOUNTER — HOSPITAL ENCOUNTER (OUTPATIENT)
Facility: HOSPITAL | Age: 63
Setting detail: HOSPITAL OUTPATIENT SURGERY
Discharge: HOME | End: 2018-09-11
Attending: SURGERY | Admitting: SURGERY

## 2018-09-11 VITALS
TEMPERATURE: 98.6 F | SYSTOLIC BLOOD PRESSURE: 96 MMHG | DIASTOLIC BLOOD PRESSURE: 48 MMHG | BODY MASS INDEX: 28.73 KG/M2 | HEIGHT: 66 IN | OXYGEN SATURATION: 95 % | HEART RATE: 52 BPM | RESPIRATION RATE: 18 BRPM | WEIGHT: 178.79 LBS

## 2018-09-11 DIAGNOSIS — K80.00 GALLSTONES AND INFLAMMATION OF GALLBLADDER WITHOUT OBSTRUCTION: ICD-10-CM

## 2018-09-11 PROCEDURE — 25010000002 IOPAMIDOL 61 % SOLUTION: Performed by: SURGERY

## 2018-09-11 PROCEDURE — 25010000002 SUCCINYLCHOLINE PER 20 MG: Performed by: NURSE ANESTHETIST, CERTIFIED REGISTERED

## 2018-09-11 PROCEDURE — 25010000002 NEOSTIGMINE 4 MG/4ML SOLUTION PREFILLED SYRINGE: Performed by: NURSE ANESTHETIST, CERTIFIED REGISTERED

## 2018-09-11 PROCEDURE — 76000 FLUOROSCOPY <1 HR PHYS/QHP: CPT

## 2018-09-11 PROCEDURE — 47563 LAPARO CHOLECYSTECTOMY/GRAPH: CPT | Performed by: SURGERY

## 2018-09-11 PROCEDURE — 88304 TISSUE EXAM BY PATHOLOGIST: CPT | Performed by: SURGERY

## 2018-09-11 PROCEDURE — 25010000002 ONDANSETRON PER 1 MG: Performed by: NURSE ANESTHETIST, CERTIFIED REGISTERED

## 2018-09-11 PROCEDURE — 25010000002 DEXAMETHASONE PER 1 MG: Performed by: NURSE ANESTHETIST, CERTIFIED REGISTERED

## 2018-09-11 PROCEDURE — 88304 TISSUE EXAM BY PATHOLOGIST: CPT | Performed by: PATHOLOGY

## 2018-09-11 PROCEDURE — 74300 X-RAY BILE DUCTS/PANCREAS: CPT | Performed by: SURGERY

## 2018-09-11 PROCEDURE — 25010000002 MIDAZOLAM PER 1 MG: Performed by: NURSE ANESTHETIST, CERTIFIED REGISTERED

## 2018-09-11 PROCEDURE — 25010000002 HYDROMORPHONE PER 4 MG: Performed by: NURSE ANESTHETIST, CERTIFIED REGISTERED

## 2018-09-11 PROCEDURE — 25010000002 FENTANYL CITRATE (PF) 100 MCG/2ML SOLUTION: Performed by: NURSE ANESTHETIST, CERTIFIED REGISTERED

## 2018-09-11 PROCEDURE — 25010000002 PROPOFOL 10 MG/ML EMULSION: Performed by: NURSE ANESTHETIST, CERTIFIED REGISTERED

## 2018-09-11 RX ORDER — MEPERIDINE HYDROCHLORIDE 50 MG/ML
12.5 INJECTION INTRAMUSCULAR; INTRAVENOUS; SUBCUTANEOUS
Status: DISCONTINUED | OUTPATIENT
Start: 2018-09-11 | End: 2018-09-11 | Stop reason: HOSPADM

## 2018-09-11 RX ORDER — ACETAMINOPHEN 325 MG/1
650 TABLET ORAL ONCE AS NEEDED
Status: DISCONTINUED | OUTPATIENT
Start: 2018-09-11 | End: 2018-09-11 | Stop reason: HOSPADM

## 2018-09-11 RX ORDER — NALOXONE HCL 0.4 MG/ML
0.2 VIAL (ML) INJECTION AS NEEDED
Status: DISCONTINUED | OUTPATIENT
Start: 2018-09-11 | End: 2018-09-11 | Stop reason: HOSPADM

## 2018-09-11 RX ORDER — DIPHENHYDRAMINE HYDROCHLORIDE 50 MG/ML
12.5 INJECTION INTRAMUSCULAR; INTRAVENOUS
Status: DISCONTINUED | OUTPATIENT
Start: 2018-09-11 | End: 2018-09-11 | Stop reason: HOSPADM

## 2018-09-11 RX ORDER — LIDOCAINE HYDROCHLORIDE 20 MG/ML
INJECTION, SOLUTION INFILTRATION; PERINEURAL AS NEEDED
Status: DISCONTINUED | OUTPATIENT
Start: 2018-09-11 | End: 2018-09-11 | Stop reason: SURG

## 2018-09-11 RX ORDER — MIDAZOLAM HYDROCHLORIDE 1 MG/ML
INJECTION INTRAMUSCULAR; INTRAVENOUS AS NEEDED
Status: DISCONTINUED | OUTPATIENT
Start: 2018-09-11 | End: 2018-09-11 | Stop reason: SURG

## 2018-09-11 RX ORDER — BUPIVACAINE HYDROCHLORIDE AND EPINEPHRINE 5; 5 MG/ML; UG/ML
INJECTION, SOLUTION EPIDURAL; INTRACAUDAL; PERINEURAL AS NEEDED
Status: DISCONTINUED | OUTPATIENT
Start: 2018-09-11 | End: 2018-09-11 | Stop reason: HOSPADM

## 2018-09-11 RX ORDER — SUCCINYLCHOLINE CHLORIDE 20 MG/ML
INJECTION INTRAMUSCULAR; INTRAVENOUS AS NEEDED
Status: DISCONTINUED | OUTPATIENT
Start: 2018-09-11 | End: 2018-09-11 | Stop reason: SURG

## 2018-09-11 RX ORDER — ACETAMINOPHEN 650 MG/1
650 SUPPOSITORY RECTAL ONCE AS NEEDED
Status: DISCONTINUED | OUTPATIENT
Start: 2018-09-11 | End: 2018-09-11 | Stop reason: HOSPADM

## 2018-09-11 RX ORDER — BUPIVACAINE HCL/0.9 % NACL/PF 0.1 %
2 PLASTIC BAG, INJECTION (ML) EPIDURAL ONCE
Status: COMPLETED | OUTPATIENT
Start: 2018-09-11 | End: 2018-09-11

## 2018-09-11 RX ORDER — FENTANYL CITRATE 50 UG/ML
INJECTION, SOLUTION INTRAMUSCULAR; INTRAVENOUS AS NEEDED
Status: DISCONTINUED | OUTPATIENT
Start: 2018-09-11 | End: 2018-09-11 | Stop reason: SURG

## 2018-09-11 RX ORDER — FLUMAZENIL 0.1 MG/ML
0.2 INJECTION INTRAVENOUS AS NEEDED
Status: DISCONTINUED | OUTPATIENT
Start: 2018-09-11 | End: 2018-09-11 | Stop reason: HOSPADM

## 2018-09-11 RX ORDER — PROPOFOL 10 MG/ML
VIAL (ML) INTRAVENOUS AS NEEDED
Status: DISCONTINUED | OUTPATIENT
Start: 2018-09-11 | End: 2018-09-11 | Stop reason: SURG

## 2018-09-11 RX ORDER — ONDANSETRON 2 MG/ML
4 INJECTION INTRAMUSCULAR; INTRAVENOUS ONCE AS NEEDED
Status: COMPLETED | OUTPATIENT
Start: 2018-09-11 | End: 2018-09-11

## 2018-09-11 RX ORDER — EPHEDRINE SULFATE 50 MG/ML
5 INJECTION, SOLUTION INTRAVENOUS ONCE AS NEEDED
Status: DISCONTINUED | OUTPATIENT
Start: 2018-09-11 | End: 2018-09-11 | Stop reason: HOSPADM

## 2018-09-11 RX ORDER — ROCURONIUM BROMIDE 10 MG/ML
INJECTION, SOLUTION INTRAVENOUS AS NEEDED
Status: DISCONTINUED | OUTPATIENT
Start: 2018-09-11 | End: 2018-09-11 | Stop reason: SURG

## 2018-09-11 RX ORDER — HYDROCODONE BITARTRATE AND ACETAMINOPHEN 7.5; 325 MG/1; MG/1
1 TABLET ORAL EVERY 4 HOURS PRN
Qty: 20 TABLET | Refills: 0 | Status: SHIPPED | OUTPATIENT
Start: 2018-09-11 | End: 2019-02-19

## 2018-09-11 RX ORDER — NEOSTIGMINE METHYLSULFATE 4 MG/4 ML
SYRINGE (ML) INTRAVENOUS AS NEEDED
Status: DISCONTINUED | OUTPATIENT
Start: 2018-09-11 | End: 2018-09-11 | Stop reason: SURG

## 2018-09-11 RX ORDER — LABETALOL HYDROCHLORIDE 5 MG/ML
5 INJECTION, SOLUTION INTRAVENOUS
Status: DISCONTINUED | OUTPATIENT
Start: 2018-09-11 | End: 2018-09-11 | Stop reason: HOSPADM

## 2018-09-11 RX ORDER — ONDANSETRON 2 MG/ML
INJECTION INTRAMUSCULAR; INTRAVENOUS AS NEEDED
Status: DISCONTINUED | OUTPATIENT
Start: 2018-09-11 | End: 2018-09-11 | Stop reason: SURG

## 2018-09-11 RX ORDER — SCOLOPAMINE TRANSDERMAL SYSTEM 1 MG/1
1 PATCH, EXTENDED RELEASE TRANSDERMAL ONCE
Status: DISCONTINUED | OUTPATIENT
Start: 2018-09-11 | End: 2018-09-11 | Stop reason: HOSPADM

## 2018-09-11 RX ORDER — SODIUM CHLORIDE, SODIUM LACTATE, POTASSIUM CHLORIDE, CALCIUM CHLORIDE 600; 310; 30; 20 MG/100ML; MG/100ML; MG/100ML; MG/100ML
100 INJECTION, SOLUTION INTRAVENOUS CONTINUOUS
Status: DISCONTINUED | OUTPATIENT
Start: 2018-09-11 | End: 2018-09-11 | Stop reason: HOSPADM

## 2018-09-11 RX ORDER — GLYCOPYRROLATE 0.2 MG/ML
INJECTION INTRAMUSCULAR; INTRAVENOUS AS NEEDED
Status: DISCONTINUED | OUTPATIENT
Start: 2018-09-11 | End: 2018-09-11 | Stop reason: SURG

## 2018-09-11 RX ORDER — 0.9 % SODIUM CHLORIDE 0.9 %
VIAL (ML) INJECTION AS NEEDED
Status: DISCONTINUED | OUTPATIENT
Start: 2018-09-11 | End: 2018-09-11 | Stop reason: HOSPADM

## 2018-09-11 RX ORDER — EPHEDRINE SULFATE 50 MG/ML
INJECTION, SOLUTION INTRAVENOUS AS NEEDED
Status: DISCONTINUED | OUTPATIENT
Start: 2018-09-11 | End: 2018-09-11 | Stop reason: SURG

## 2018-09-11 RX ORDER — DEXAMETHASONE SODIUM PHOSPHATE 4 MG/ML
INJECTION, SOLUTION INTRA-ARTICULAR; INTRALESIONAL; INTRAMUSCULAR; INTRAVENOUS; SOFT TISSUE AS NEEDED
Status: DISCONTINUED | OUTPATIENT
Start: 2018-09-11 | End: 2018-09-11 | Stop reason: SURG

## 2018-09-11 RX ADMIN — GLYCOPYRROLATE 0.1 MG: 0.2 INJECTION, SOLUTION INTRAMUSCULAR; INTRAVENOUS at 07:40

## 2018-09-11 RX ADMIN — EPHEDRINE SULFATE 5 MG: 50 INJECTION INTRAVENOUS at 08:20

## 2018-09-11 RX ADMIN — ONDANSETRON 4 MG: 2 INJECTION INTRAMUSCULAR; INTRAVENOUS at 08:17

## 2018-09-11 RX ADMIN — SODIUM CHLORIDE, POTASSIUM CHLORIDE, SODIUM LACTATE AND CALCIUM CHLORIDE 100 ML/HR: 600; 310; 30; 20 INJECTION, SOLUTION INTRAVENOUS at 06:34

## 2018-09-11 RX ADMIN — FENTANYL CITRATE 50 MCG: 50 INJECTION, SOLUTION INTRAMUSCULAR; INTRAVENOUS at 07:23

## 2018-09-11 RX ADMIN — ROCURONIUM BROMIDE 5 MG: 10 INJECTION INTRAVENOUS at 07:23

## 2018-09-11 RX ADMIN — SODIUM CHLORIDE, POTASSIUM CHLORIDE, SODIUM LACTATE AND CALCIUM CHLORIDE 100 ML/HR: 600; 310; 30; 20 INJECTION, SOLUTION INTRAVENOUS at 10:13

## 2018-09-11 RX ADMIN — HYDROMORPHONE HYDROCHLORIDE 0.5 MG: 1 INJECTION, SOLUTION INTRAMUSCULAR; INTRAVENOUS; SUBCUTANEOUS at 09:11

## 2018-09-11 RX ADMIN — SCOPALAMINE 1 PATCH: 1 PATCH, EXTENDED RELEASE TRANSDERMAL at 06:39

## 2018-09-11 RX ADMIN — FENTANYL CITRATE 50 MCG: 50 INJECTION, SOLUTION INTRAMUSCULAR; INTRAVENOUS at 07:22

## 2018-09-11 RX ADMIN — GLYCOPYRROLATE 0.8 MG: 0.2 INJECTION, SOLUTION INTRAMUSCULAR; INTRAVENOUS at 08:34

## 2018-09-11 RX ADMIN — Medication 4 MG: at 08:34

## 2018-09-11 RX ADMIN — MIDAZOLAM 2 MG: 1 INJECTION INTRAMUSCULAR; INTRAVENOUS at 07:12

## 2018-09-11 RX ADMIN — PROPOFOL 160 MG: 10 INJECTION, EMULSION INTRAVENOUS at 07:23

## 2018-09-11 RX ADMIN — Medication 2 G: at 07:27

## 2018-09-11 RX ADMIN — DEXAMETHASONE SODIUM PHOSPHATE 4 MG: 4 INJECTION, SOLUTION INTRAMUSCULAR; INTRAVENOUS at 08:18

## 2018-09-11 RX ADMIN — LIDOCAINE HYDROCHLORIDE 100 MG: 20 INJECTION, SOLUTION INFILTRATION; PERINEURAL at 07:23

## 2018-09-11 RX ADMIN — ONDANSETRON 4 MG: 2 INJECTION INTRAMUSCULAR; INTRAVENOUS at 09:30

## 2018-09-11 RX ADMIN — SUCCINYLCHOLINE CHLORIDE 80 MG: 20 INJECTION, SOLUTION INTRAMUSCULAR; INTRAVENOUS at 07:23

## 2018-09-11 RX ADMIN — ROCURONIUM BROMIDE 45 MG: 10 INJECTION INTRAVENOUS at 07:44

## 2018-09-11 RX ADMIN — GLYCOPYRROLATE 0.1 MG: 0.2 INJECTION, SOLUTION INTRAMUSCULAR; INTRAVENOUS at 07:44

## 2018-09-11 NOTE — PROGRESS NOTES
Chief Complaint   Patient presents with   • Abdominal Pain     Right upper quadrant pain. Possible gallbladder problems.        Abdominal Pain   This is a new problem. The current episode started in the past 7 days. The onset quality is sudden. The problem occurs constantly. The problem has been gradually worsening. The pain is located in the RUQ and epigastric region. The pain is moderate. The quality of the pain is colicky and sharp. The abdominal pain radiates to the back. Associated symptoms include anorexia, nausea and vomiting. Pertinent negatives include no arthralgias, belching, constipation, diarrhea, dysuria, fever, flatus, frequency, headaches, hematochezia, hematuria, melena, myalgias or weight loss. Nothing aggravates the pain. The pain is relieved by nothing. She has tried nothing for the symptoms. Prior diagnostic workup includes ultrasound. Her past medical history is significant for gallstones. There is no history of abdominal surgery, colon cancer, Crohn's disease, GERD, irritable bowel syndrome, pancreatitis, PUD or ulcerative colitis.   Referred from the ER with moderate to severe RUQ abdominal pain. US shows:  Study Result     Ultrasound gallbladder on 9/3/2018     CLINICAL INDICATION: Generalized abdominal pain     COMPARISON: CT from 11/12/2015     FINDINGS: Multiple sonographic images are obtained throughout the  right upper quadrant, both transverse and sagittal images are  obtained. Visualized pancreas is unremarkable. Visualized liver  is homogeneous without focal lesion or evidence of intrahepatic  biliary ductal dilatation. Visualized hepatic vasculature is  patent and with a normal directional flow. Right kidney shows no  hydronephrosis. There is a moderate size echogenic focus with  posterior shadowing in the gallbladder consistent with gallstone.  Borderline gallbladder wall thickening is noted. No  pericholecystic fluid is noted. The common duct measures 6 mm  which is within normal  limits mitigating against obstruction of  the biliary tree.     IMPRESSION:  Cholelithiasis with borderline gallbladder wall  thickening. If there is high clinical concern for acute  cholecystitis consider follow-up hepatobiliary scan.     Electronically signed by:  Thuan Barney  9/3/2018 4:21 AM CDT  Workstation: [id number]       Past Medical History:   Diagnosis Date   • Acquired equinus deformity of foot     ANKLE   • Anxiety    • Breast cancer (CMS/HCC)    • Cancer (CMS/HCC)     BREAST   • Depression    • Diverticular disease of colon    • Drug therapy    • Esophagitis    • Fibrocystic breast    • Gall stone    • GERD (gastroesophageal reflux disease)    • History of bone density study 2012    NORMAL   • History of echocardiogram 2016    Normal LV systolic function.Ef of 55-60%.Grade 1 diastolic dysfunciton of the LV myocardium.Mild left atiral enlargement.No evidence of pericardial effusion   • History of mammogram 2011    one breast (Benign finding.)   • History of Papanicolaou smear of cervix 2011    NEGATIVE   • Hx of radiation therapy    • Hyperlipidemia    • Injury of head and neck    • Minor head injury    • Personal history of malignant neoplasm of breast    • Plantar fasciitis    • Primary fibromyalgia syndrome    • Solitary pulmonary nodule present on computed tomography of lung        Past Surgical History:   Procedure Laterality Date   • BREAST SURGERY      Carcinoma of the right breast;Mastectomy   •  SECTION     • COLONOSCOPY  2016    Normal colon.No specimens collected   • DIAGNOSTIC LAPAROSCOPY  1977    (Amenorrhea, probable polycystic ovaries. Polycystic ovaries   • ESOPHAGOSCOPY / EGD  2016    Mildly severe esophagitis.Gastritis.Normal examined duodenum.Medium sized hiatus hernia   • EXCISION BREAST LESION W/ PREOP NEEDLE LOC  1987    Bilateral excision of breast masses. Bilateral breast masses; probable fibrocystic disease.   •  HYSTEROSCOPY  12/13/2011    Exam under anesthesia, diagnostic hysterectomy with fractional dilation and curettage. Thickened endometrial stripe, on Tamoxifen therapy.   • MASTECTOMY Right    • OTHER SURGICAL HISTORY  02/12/2016    NEEDLE BIOPSY LYMPH NODES; Ultrasound directed core needle biopsy of the left axilla.   • TUBAL ABDOMINAL LIGATION           Current Outpatient Prescriptions:   •  B Complex-C-E-Zn (B COMPLEX-C-E-ZINC) tablet, Take 1 tablet by mouth Daily., Disp: , Rfl:   •  Cholecalciferol (VITAMIN D-3 PO), Take 1 tablet by mouth Daily., Disp: , Rfl:   •  DEXILANT 60 MG capsule, TAKE 1 CAPSULE BY MOUTH DAILY FOR 90 DAYS., Disp: , Rfl: 3  •  Probiotic Product (PROBIOTIC DAILY PO), Take 1 capsule by mouth Daily., Disp: , Rfl:   •  vitamin A 70826 UNIT capsule, Take 10,000 Units by mouth Daily., Disp: , Rfl:   •  busPIRone (BUSPAR) 5 MG tablet, Take 5 mg by mouth Every Night., Disp: , Rfl:   •  dexlansoprazole (DEXILANT) 60 MG capsule, Take 60 mg by mouth Every Night., Disp: , Rfl:   •  fluticasone (FLONASE) 50 MCG/ACT nasal spray, 2 sprays into the nostril(s) as directed by provider Daily As Needed for Rhinitis., Disp: , Rfl:   •  nitrofurantoin (MACRODANTIN) 100 MG capsule, Take 100 mg by mouth Daily., Disp: , Rfl:   •  ondansetron ODT (ZOFRAN-ODT) 4 MG disintegrating tablet, Take 1 tablet by mouth Every 8 (Eight) Hours As Needed for Nausea or Vomiting., Disp: 10 tablet, Rfl: 0  •  sertraline (ZOLOFT) 50 MG tablet, Take 50 mg by mouth Every Night., Disp: , Rfl:     Allergies   Allergen Reactions   • Erythromycin Other (See Comments)     SEVERE WEAKNESS   • Phenergan [Promethazine Hcl] Other (See Comments)     UNKNOWN   • Promethazine Other (See Comments)     UNKNOWN   • Propofol      COUGH/WHEEZE   • Tetracyclines & Related GI Intolerance       Family History   Problem Relation Age of Onset   • Diabetes Other    • Arthritis Other    • Breast cancer Maternal Aunt        Social History     Social History   •  Marital status: Single     Spouse name: N/A   • Number of children: N/A   • Years of education: N/A     Occupational History   • Not on file.     Social History Main Topics   • Smoking status: Former Smoker   • Smokeless tobacco: Never Used      Comment: Ceased Smoking 12 Years Prior   • Alcohol use No   • Drug use: No   • Sexual activity: Defer     Other Topics Concern   • Not on file     Social History Narrative   • No narrative on file       Review of Systems   Constitutional: Negative for appetite change, chills, fever, unexpected weight change and weight loss.   HENT: Negative for hearing loss, nosebleeds and trouble swallowing.    Eyes: Negative for visual disturbance.   Respiratory: Negative for apnea, cough, choking, chest tightness, shortness of breath, wheezing and stridor.    Cardiovascular: Negative for chest pain, palpitations and leg swelling.   Gastrointestinal: Positive for abdominal pain, anorexia, nausea and vomiting. Negative for abdominal distention, blood in stool, constipation, diarrhea, flatus, hematochezia and melena.   Endocrine: Negative for cold intolerance, heat intolerance, polydipsia, polyphagia and polyuria.   Genitourinary: Negative for difficulty urinating, dysuria, frequency, hematuria and urgency.   Musculoskeletal: Negative for arthralgias, back pain, myalgias and neck pain.   Skin: Negative for color change, pallor and rash.   Allergic/Immunologic: Negative for immunocompromised state.   Neurological: Negative for dizziness, seizures, syncope, light-headedness, numbness and headaches.   Hematological: Negative for adenopathy.   Psychiatric/Behavioral: Negative for suicidal ideas. The patient is not nervous/anxious.        Physical Exam   Constitutional: She is oriented to person, place, and time. She appears well-developed and well-nourished. No distress.   HENT:   Head: Normocephalic and atraumatic.   Mouth/Throat: No oropharyngeal exudate.   Eyes: Pupils are equal, round, and  reactive to light. EOM are normal. No scleral icterus.   Neck: Normal range of motion. Neck supple. No JVD present. No tracheal deviation present. No thyromegaly present.   Cardiovascular: Normal rate, regular rhythm and normal heart sounds.    Pulmonary/Chest: Effort normal and breath sounds normal. No stridor.   Abdominal: Soft. Bowel sounds are normal. She exhibits no distension and no mass. There is tenderness. There is no rebound and no guarding. No hernia.   Musculoskeletal: Normal range of motion. She exhibits no tenderness.   Lymphadenopathy:     She has no cervical adenopathy.   Neurological: She is alert and oriented to person, place, and time.   Skin: Skin is warm and dry. She is not diaphoretic.   Psychiatric: She has a normal mood and affect. Her behavior is normal. Judgment and thought content normal.   Vitals reviewed.        ASSESSMENT    Humera was seen today for abdominal pain.    Diagnoses and all orders for this visit:    Gallstones and inflammation of gallbladder without obstruction  -     lactated ringers infusion; Infuse 100 mL/hr into a venous catheter Continuous.  -     ceFAZolin in 0.9% normal saline (ANCEF) IVPB solution 2 g; Infuse 100 mL into a venous catheter 1 (One) Time.  -     Case Request; Standing  -     Case Request    Other orders  -     Follow anesthesia standing orders.  -     Provide instructions to patient on NPO status  -     Follow anesthesia standing orders.; Standing  -     Verify NPO Status; Standing  -     Obtain informed consent; Standing  -     SCD (sequential compression device)- to be placed on patient in Pre-op; Standing        PLAN    1. Laparoscopic possible open cholecystectomy    What are the indications that have led your doctor to the opinion that an operation is necessary?    * Symptoms and studies indicate that there is gallbladder disease causing pain the abdomen.    What, if any, alternative treatments are available for your condition?    * Watching and  waiting with no surgery  * Removing the gallbladder through one large incision made in the abdomen.    What will be the likely result if you don't have the operation?    * Pain, infection, inflammation, and/or stones may continue or get worse    What are the basic procedures involved in the operation?    * The surgery may be done laparoscopically. Laparoscopic surgery is done using a scope and hollow tube(s) called ports. These are inserted through small cuts in the abdomen. A scope is a thin, lighted instrument with a camera attached. Tools are passed through the ports. Carbon dioxide gas is pumped into the abdomen to create workspace.   If the surgery cannot be performed with a scope, it may be done through a larger cut. This occurs 2% of the time.  The gall bladder will be removed after it is  from the liver, bile duct, and surrounding arteries. An x-ray of the bile ducts is usually performed with contrast dye injected into the ducts.  If stones are found, another procedure may be required to remove them.  A drain may rarely be inserted to keep fluid from building up in the treatment area.     What are the risks?    * Exposure to radiation. Pregnant women and women of childbearing age should talk with their doctor about this.  * Pain, numbness, swelling, weakness or scarring where tissue is cut.  * The gas used in laparoscopic procedures to inflate the abdomen can become trapped in tissues. Gas in the bloodstream can dangerously affect blood flow and  heart function.  * The procedure may not cure or relieve your condition or symptoms. They may come back and even worsen.  * You may need additional tests or treatment.  * Bleeding. You may need blood transfusions or other treatments. This may be discovered during the procedure, or later.  * Embolism. An embolism is an object that moves through your body in your bloodstream. It can be an air bubble, a blood clot, a piece of fat or other material. It can  block a blood vessel. This can lead to stroke, pulmonary embolism (blockage of the main artery of the lung), or injury to organs or extremities.  * Reactions to dye used for imaging. These may include hives, swelling of the face and/or throat, difficulty breathing, and kidney failure.  * Retained stones in bile ducts.  * Wound infection, poor healing or reopening. Blood or clear fluid can also collect at the wound site(s).  * Damage to the bile ducts or nearby structures. This may be discovered during the procedure, or later.  * Incisional hernia. Weak scar tissue may allow tissue to bulge through the cut.  * The instrument(s) placed in your abdomen can cause injuries to nearby structures.  * Death.    How is the operation expected to improve your health or quality of life?    * Operation is expected to decrease pain and nausea/vomiting associated with gallstones.  * This procedure may relieve or prevent infection, inflammation, and/or pain from stones or blockage of bile ducts.    Is hospitalization necessary and, if so, how long can you expect to be hospitalized?    * Most often, the operation is performed on an outpatient basis. Occasionally hospitalization is necessary for pain or nausea control    What can you expect during your recovery period?    * The gas used in laparoscopic procedures to inflate the abdomen can become trapped    in tissues. Shoulder pain may occur for a few days after surgery.   * Nausea and pain control are obtained with oral medication.  * If you are on metformin, it will need to be held for 48 hours after surgery    When can you expect to resume normal activities?    * Normal activity can be resumed in 10-14 days if the procedure is performed laparoscopically. Open operations require no lifting or straining for 6 weeks.     Are there likely to be residual effects from the operation?    * Diarrhea often occurs, mostly temporary. Occasionally there is still minor food intolerance.    All  questions were answered. The patient agrees to operation.                    This document has been electronically signed by Dirk Leigh MD on September 10, 2018 8:51 PM

## 2018-09-11 NOTE — ANESTHESIA PROCEDURE NOTES
Airway  Urgency: elective    Airway not difficult    General Information and Staff    Patient location during procedure: OR    Indications and Patient Condition  Indications for airway management: airway protection    Preoxygenated: yes  MILS maintained throughout  Mask difficulty assessment: 0 - not attempted    Final Airway Details  Final airway type: endotracheal airway      Successful airway: ETT  Cuffed: yes   Successful intubation technique: video laryngoscopy and RSI  Facilitating devices/methods: cricoid pressure  Endotracheal tube insertion site: oral  Blade: Robert  Blade size: 3  ETT size: 7.0 mm  Cormack-Lehane Classification: grade I - full view of glottis  Placement verified by: chest auscultation   Measured from: gums  ETT to gums (cm): 20  Number of attempts at approach: 1

## 2018-09-11 NOTE — NURSING NOTE
Dr. Leigh on floor and informed that pt had been back for 4 hours . Bladder scan done and only showed 195cc, informed okay to bolus the current IV fluids hanging.

## 2018-09-11 NOTE — DISCHARGE INSTRUCTIONS
Dr. Dirk Leigh  [address] [place] [address]  [phone] (office)  [phone] (hospital)  [phone] (cell)  Discharge Instructions for Gall Bladder Surgery      1. Go home, rest and take it easy today; however, you should get up and move about several times today to reduce the risk of developing a clot in your legs.    2. You may experience some dizziness or memory loss from the anesthesia.  This may last for the next 24 hours.  Someone should plan on staying with you for the first 24 hours for your safety.  3. Do not make any important legal decisions or sign any legal papers for the next 24 hours.    4. Eat and drink lightly today.  Start off with liquids, jello, soup, crackers or other bland foods at first. You may advance your diet tomorrow as tolerated as long as you do not experience any nausea or vomiting.   5. You may remove your outer dressings in 3 days.  The white tapes called steri-strips should stay in place.  They will fall off on their own in 1-2 weeks.  Do not worry if they come off sooner.    6. You may notice some bleeding/drainage on your outer dressings. A little bloody drainage is normal. If the bleeding/drainage is such that the bandage cannot absorb it, remove the dressing, apply clean gauze and apply firm pressure for a full 15 minutes.  If the bleeding continues, please call me.  7. You may shower tomorrow.  No tub baths for at least 1 week until your incisions are completely healed.       8. You have received a prescription for a narcotic pain medicine, as you will have some pain following surgery.   You will not be totally pain free, but your pain medicine should make the pain tolerable.  Please take your pain medicine as prescribed and always take your pills with food to prevent nausea. If you are having severe pain that cannot be controlled by the pain medicine, please contact me.    9. If needed, you may receive a prescription for an anti-nausea medicine.  Please take  this as prescribed for any nausea or vomiting.  Nausea could be a result of the anesthesia or a result of the narcotic pain medicine.  If you experience severe nausea and vomiting that cannot be controlled by the nausea medicine, please call me.    10. It is not unusual to experience pain/discomfort in your shoulders or under your ribs after surgery.  It is from the gas used during the laparoscopic procedure and usually lasts 1-3 days.  The prescription pain medicine is used to treat the surgical pain and does not typically alleviate this “gassy” pain.   11. No driving for 24 hours and for as long as you are taking your prescription pain medicine.    12. If an appointment is not given to you before discharge, you will need to call the office       at [phone] to schedule a follow-up appointment in 5-7 days.   13. Remember to contact me for any of the following:    • Fever > 101 degrees  • Severe pain that cannot be controlled by taking your pain pills  • Severe nausea or vomiting that cannot be controlled by taking your nausea pills  • Significant bleeding of your incisions  • Drainage that has a bad smell or is yellow or green in appearance  • Any other questions or concerns

## 2018-09-11 NOTE — H&P (VIEW-ONLY)
Chief Complaint   Patient presents with   • Abdominal Pain     Right upper quadrant pain. Possible gallbladder problems.        Abdominal Pain   This is a new problem. The current episode started in the past 7 days. The onset quality is sudden. The problem occurs constantly. The problem has been gradually worsening. The pain is located in the RUQ and epigastric region. The pain is moderate. The quality of the pain is colicky and sharp. The abdominal pain radiates to the back. Associated symptoms include anorexia, nausea and vomiting. Pertinent negatives include no arthralgias, belching, constipation, diarrhea, dysuria, fever, flatus, frequency, headaches, hematochezia, hematuria, melena, myalgias or weight loss. Nothing aggravates the pain. The pain is relieved by nothing. She has tried nothing for the symptoms. Prior diagnostic workup includes ultrasound. Her past medical history is significant for gallstones. There is no history of abdominal surgery, colon cancer, Crohn's disease, GERD, irritable bowel syndrome, pancreatitis, PUD or ulcerative colitis.   Referred from the ER with moderate to severe RUQ abdominal pain. US shows:  Study Result     Ultrasound gallbladder on 9/3/2018     CLINICAL INDICATION: Generalized abdominal pain     COMPARISON: CT from 11/12/2015     FINDINGS: Multiple sonographic images are obtained throughout the  right upper quadrant, both transverse and sagittal images are  obtained. Visualized pancreas is unremarkable. Visualized liver  is homogeneous without focal lesion or evidence of intrahepatic  biliary ductal dilatation. Visualized hepatic vasculature is  patent and with a normal directional flow. Right kidney shows no  hydronephrosis. There is a moderate size echogenic focus with  posterior shadowing in the gallbladder consistent with gallstone.  Borderline gallbladder wall thickening is noted. No  pericholecystic fluid is noted. The common duct measures 6 mm  which is within normal  limits mitigating against obstruction of  the biliary tree.     IMPRESSION:  Cholelithiasis with borderline gallbladder wall  thickening. If there is high clinical concern for acute  cholecystitis consider follow-up hepatobiliary scan.     Electronically signed by:  Thuan Barney  9/3/2018 4:21 AM CDT  Workstation: [id number]       Past Medical History:   Diagnosis Date   • Acquired equinus deformity of foot     ANKLE   • Anxiety    • Breast cancer (CMS/HCC)    • Cancer (CMS/HCC)     BREAST   • Depression    • Diverticular disease of colon    • Drug therapy    • Esophagitis    • Fibrocystic breast    • Gall stone    • GERD (gastroesophageal reflux disease)    • History of bone density study 2012    NORMAL   • History of echocardiogram 2016    Normal LV systolic function.Ef of 55-60%.Grade 1 diastolic dysfunciton of the LV myocardium.Mild left atiral enlargement.No evidence of pericardial effusion   • History of mammogram 2011    one breast (Benign finding.)   • History of Papanicolaou smear of cervix 2011    NEGATIVE   • Hx of radiation therapy    • Hyperlipidemia    • Injury of head and neck    • Minor head injury    • Personal history of malignant neoplasm of breast    • Plantar fasciitis    • Primary fibromyalgia syndrome    • Solitary pulmonary nodule present on computed tomography of lung        Past Surgical History:   Procedure Laterality Date   • BREAST SURGERY      Carcinoma of the right breast;Mastectomy   •  SECTION     • COLONOSCOPY  2016    Normal colon.No specimens collected   • DIAGNOSTIC LAPAROSCOPY  1977    (Amenorrhea, probable polycystic ovaries. Polycystic ovaries   • ESOPHAGOSCOPY / EGD  2016    Mildly severe esophagitis.Gastritis.Normal examined duodenum.Medium sized hiatus hernia   • EXCISION BREAST LESION W/ PREOP NEEDLE LOC  1987    Bilateral excision of breast masses. Bilateral breast masses; probable fibrocystic disease.   •  HYSTEROSCOPY  12/13/2011    Exam under anesthesia, diagnostic hysterectomy with fractional dilation and curettage. Thickened endometrial stripe, on Tamoxifen therapy.   • MASTECTOMY Right    • OTHER SURGICAL HISTORY  02/12/2016    NEEDLE BIOPSY LYMPH NODES; Ultrasound directed core needle biopsy of the left axilla.   • TUBAL ABDOMINAL LIGATION           Current Outpatient Prescriptions:   •  B Complex-C-E-Zn (B COMPLEX-C-E-ZINC) tablet, Take 1 tablet by mouth Daily., Disp: , Rfl:   •  Cholecalciferol (VITAMIN D-3 PO), Take 1 tablet by mouth Daily., Disp: , Rfl:   •  DEXILANT 60 MG capsule, TAKE 1 CAPSULE BY MOUTH DAILY FOR 90 DAYS., Disp: , Rfl: 3  •  Probiotic Product (PROBIOTIC DAILY PO), Take 1 capsule by mouth Daily., Disp: , Rfl:   •  vitamin A 23189 UNIT capsule, Take 10,000 Units by mouth Daily., Disp: , Rfl:   •  busPIRone (BUSPAR) 5 MG tablet, Take 5 mg by mouth Every Night., Disp: , Rfl:   •  dexlansoprazole (DEXILANT) 60 MG capsule, Take 60 mg by mouth Every Night., Disp: , Rfl:   •  fluticasone (FLONASE) 50 MCG/ACT nasal spray, 2 sprays into the nostril(s) as directed by provider Daily As Needed for Rhinitis., Disp: , Rfl:   •  nitrofurantoin (MACRODANTIN) 100 MG capsule, Take 100 mg by mouth Daily., Disp: , Rfl:   •  ondansetron ODT (ZOFRAN-ODT) 4 MG disintegrating tablet, Take 1 tablet by mouth Every 8 (Eight) Hours As Needed for Nausea or Vomiting., Disp: 10 tablet, Rfl: 0  •  sertraline (ZOLOFT) 50 MG tablet, Take 50 mg by mouth Every Night., Disp: , Rfl:     Allergies   Allergen Reactions   • Erythromycin Other (See Comments)     SEVERE WEAKNESS   • Phenergan [Promethazine Hcl] Other (See Comments)     UNKNOWN   • Promethazine Other (See Comments)     UNKNOWN   • Propofol      COUGH/WHEEZE   • Tetracyclines & Related GI Intolerance       Family History   Problem Relation Age of Onset   • Diabetes Other    • Arthritis Other    • Breast cancer Maternal Aunt        Social History     Social History   •  Marital status: Single     Spouse name: N/A   • Number of children: N/A   • Years of education: N/A     Occupational History   • Not on file.     Social History Main Topics   • Smoking status: Former Smoker   • Smokeless tobacco: Never Used      Comment: Ceased Smoking 12 Years Prior   • Alcohol use No   • Drug use: No   • Sexual activity: Defer     Other Topics Concern   • Not on file     Social History Narrative   • No narrative on file       Review of Systems   Constitutional: Negative for appetite change, chills, fever, unexpected weight change and weight loss.   HENT: Negative for hearing loss, nosebleeds and trouble swallowing.    Eyes: Negative for visual disturbance.   Respiratory: Negative for apnea, cough, choking, chest tightness, shortness of breath, wheezing and stridor.    Cardiovascular: Negative for chest pain, palpitations and leg swelling.   Gastrointestinal: Positive for abdominal pain, anorexia, nausea and vomiting. Negative for abdominal distention, blood in stool, constipation, diarrhea, flatus, hematochezia and melena.   Endocrine: Negative for cold intolerance, heat intolerance, polydipsia, polyphagia and polyuria.   Genitourinary: Negative for difficulty urinating, dysuria, frequency, hematuria and urgency.   Musculoskeletal: Negative for arthralgias, back pain, myalgias and neck pain.   Skin: Negative for color change, pallor and rash.   Allergic/Immunologic: Negative for immunocompromised state.   Neurological: Negative for dizziness, seizures, syncope, light-headedness, numbness and headaches.   Hematological: Negative for adenopathy.   Psychiatric/Behavioral: Negative for suicidal ideas. The patient is not nervous/anxious.        Physical Exam   Constitutional: She is oriented to person, place, and time. She appears well-developed and well-nourished. No distress.   HENT:   Head: Normocephalic and atraumatic.   Mouth/Throat: No oropharyngeal exudate.   Eyes: Pupils are equal, round, and  reactive to light. EOM are normal. No scleral icterus.   Neck: Normal range of motion. Neck supple. No JVD present. No tracheal deviation present. No thyromegaly present.   Cardiovascular: Normal rate, regular rhythm and normal heart sounds.    Pulmonary/Chest: Effort normal and breath sounds normal. No stridor.   Abdominal: Soft. Bowel sounds are normal. She exhibits no distension and no mass. There is tenderness. There is no rebound and no guarding. No hernia.   Musculoskeletal: Normal range of motion. She exhibits no tenderness.   Lymphadenopathy:     She has no cervical adenopathy.   Neurological: She is alert and oriented to person, place, and time.   Skin: Skin is warm and dry. She is not diaphoretic.   Psychiatric: She has a normal mood and affect. Her behavior is normal. Judgment and thought content normal.   Vitals reviewed.        ASSESSMENT    Humera was seen today for abdominal pain.    Diagnoses and all orders for this visit:    Gallstones and inflammation of gallbladder without obstruction  -     lactated ringers infusion; Infuse 100 mL/hr into a venous catheter Continuous.  -     ceFAZolin in 0.9% normal saline (ANCEF) IVPB solution 2 g; Infuse 100 mL into a venous catheter 1 (One) Time.  -     Case Request; Standing  -     Case Request    Other orders  -     Follow anesthesia standing orders.  -     Provide instructions to patient on NPO status  -     Follow anesthesia standing orders.; Standing  -     Verify NPO Status; Standing  -     Obtain informed consent; Standing  -     SCD (sequential compression device)- to be placed on patient in Pre-op; Standing        PLAN    1. Laparoscopic possible open cholecystectomy    What are the indications that have led your doctor to the opinion that an operation is necessary?    * Symptoms and studies indicate that there is gallbladder disease causing pain the abdomen.    What, if any, alternative treatments are available for your condition?    * Watching and  waiting with no surgery  * Removing the gallbladder through one large incision made in the abdomen.    What will be the likely result if you don't have the operation?    * Pain, infection, inflammation, and/or stones may continue or get worse    What are the basic procedures involved in the operation?    * The surgery may be done laparoscopically. Laparoscopic surgery is done using a scope and hollow tube(s) called ports. These are inserted through small cuts in the abdomen. A scope is a thin, lighted instrument with a camera attached. Tools are passed through the ports. Carbon dioxide gas is pumped into the abdomen to create workspace.   If the surgery cannot be performed with a scope, it may be done through a larger cut. This occurs 2% of the time.  The gall bladder will be removed after it is  from the liver, bile duct, and surrounding arteries. An x-ray of the bile ducts is usually performed with contrast dye injected into the ducts.  If stones are found, another procedure may be required to remove them.  A drain may rarely be inserted to keep fluid from building up in the treatment area.     What are the risks?    * Exposure to radiation. Pregnant women and women of childbearing age should talk with their doctor about this.  * Pain, numbness, swelling, weakness or scarring where tissue is cut.  * The gas used in laparoscopic procedures to inflate the abdomen can become trapped in tissues. Gas in the bloodstream can dangerously affect blood flow and  heart function.  * The procedure may not cure or relieve your condition or symptoms. They may come back and even worsen.  * You may need additional tests or treatment.  * Bleeding. You may need blood transfusions or other treatments. This may be discovered during the procedure, or later.  * Embolism. An embolism is an object that moves through your body in your bloodstream. It can be an air bubble, a blood clot, a piece of fat or other material. It can  block a blood vessel. This can lead to stroke, pulmonary embolism (blockage of the main artery of the lung), or injury to organs or extremities.  * Reactions to dye used for imaging. These may include hives, swelling of the face and/or throat, difficulty breathing, and kidney failure.  * Retained stones in bile ducts.  * Wound infection, poor healing or reopening. Blood or clear fluid can also collect at the wound site(s).  * Damage to the bile ducts or nearby structures. This may be discovered during the procedure, or later.  * Incisional hernia. Weak scar tissue may allow tissue to bulge through the cut.  * The instrument(s) placed in your abdomen can cause injuries to nearby structures.  * Death.    How is the operation expected to improve your health or quality of life?    * Operation is expected to decrease pain and nausea/vomiting associated with gallstones.  * This procedure may relieve or prevent infection, inflammation, and/or pain from stones or blockage of bile ducts.    Is hospitalization necessary and, if so, how long can you expect to be hospitalized?    * Most often, the operation is performed on an outpatient basis. Occasionally hospitalization is necessary for pain or nausea control    What can you expect during your recovery period?    * The gas used in laparoscopic procedures to inflate the abdomen can become trapped    in tissues. Shoulder pain may occur for a few days after surgery.   * Nausea and pain control are obtained with oral medication.  * If you are on metformin, it will need to be held for 48 hours after surgery    When can you expect to resume normal activities?    * Normal activity can be resumed in 10-14 days if the procedure is performed laparoscopically. Open operations require no lifting or straining for 6 weeks.     Are there likely to be residual effects from the operation?    * Diarrhea often occurs, mostly temporary. Occasionally there is still minor food intolerance.    All  questions were answered. The patient agrees to operation.                    This document has been electronically signed by Dirk Leigh MD on September 10, 2018 8:51 PM

## 2018-09-11 NOTE — NURSING NOTE
Pt up and walked in hallway. Pt had been up and unable to void. Bladder scan done only showed 195cc in bladder, IV fluids dripping and pt is able able to drink fluid without any problems. Will monitor.

## 2018-09-11 NOTE — OP NOTE
CHOLECYSTECTOMY LAPAROSCOPIC INTRAOPERATIVE CHOLANGIOGRAM  Procedure Note    Humera Swartz  9/11/2018    Pre-op Diagnosis:   Gallstones and inflammation of gallbladder without obstruction [K80.00]    Post-op Diagnosis:     Post-Op Diagnosis Codes:     * Gallstones and inflammation of gallbladder without obstruction [K80.00]    Procedure:  LAPAROSCOPIC CHOLECYSTECTOMY WITH INTRAOPERATIVE CHOLANGIOGRAM    (C-Arm # 1)  INTERPRETATION OF IOC    Surgeon(s):  Dirk Leigh MD    Anesthesia: General    Staff:   Circulator: Iona Murguia RN; Dinah Polanco RN  Scrub Person: Aries Arechiga  Endo Technician: Skylar Oliveira CST  Assistant: Terese Bucio CSA  Other: Dago Call    Estimated Blood Loss: minimal    Specimens:                ID Type Source Tests Collected by Time   A : gallbladder Tissue Gallbladder TISSUE PATHOLOGY EXAM Dirk Leigh MD 9/11/2018 0746         Drains:  Minimal    Findings: Small stones, normal IOC    Complications: None    INDICATION:  This is a 63-year-old with epigastric and right upper quadrant abdominal pain. Positive  ultrasound for stones. Taken to the operating room for laparoscopic cholecystectomy.    DESCRIPTION:  The patient was brought to the operating room and placed supine on the operating table. After adequate general endotracheal anesthesia, the abdominal area was prepped and draped in a sterile manner. A briefing and timeout were performed and all parties were in agreement.     The abdominal area was prepped and draped in a sterile manner. A transverse incision was made slightly to the right of the midline in the epigastrium subcostally. A 5 mm XCEL trocar was uneventfully passed into the abdomen under direct vision with no visceral injury. The abdomen was insufflated to a total of 15 mmHg, 4.8 L of CO2. A 5 mm laparoscope revealed an excellent view of the upper and lower abdominal areas.. A longitudinal skin incision was made at the umbilicus and a 5 mm   XCEL trocar was placed under direct vision with no visceral injury. The camera was then moved to the umbilical port site and an excellent view of the upper abdomen was obtained. An incision was made at the tip of the 12th rib on the right side and carried into the subcutaneous tissue. A 5 mm  XCEL trocar was uneventfully passed into the abdomen under direct vision with no visceral injury.  A fourth 5 mm  XCEL trocar was placed in the midclavicular line subcostally on the right side under direct vision.  The bed was then tilted in reverse Trendelenburg and tipped to the left. The patient tolerated the positioning and insufflation without difficulty.    The gallbladder was retracted upwards and outwards by grasping the top and the infundibulum of the gallbladder with atraumatic graspers passed through the lateral ports. The peritoneum around the infundibulum was taken down for a distance of 1/3 of the length of the gallbladder on the anterior and posterior sides. The cystic duct and artery easily came into view as did the 5th segment of the liver behind these structures.     A Moy clamp was placed across the infundibulum and a fluoroscopic cholangiogram was performed by piercing the infundibulum with a needle and injecting contrast. This demonstrated good filling proximally and distally, intact bile ducts, no stones in the common duct, and good emptying into the duodenum.     The cholangiocath was removed and the cystic duct was doubly clipped and divided. The cystic artery was doubly clipped and divided in all branches. The gallbladder was then dissected out of the liver bed using electrocautery.  In the center of the liver bed well away from the hilum, there was a large plexus of vessels one of which appeared to go straight back into the liver.  We carefully avoided this dissection the vessels going to the gallbladder very carefully clipped and each one of these individually.  Once the gallbladder had been nearly  completely excised, pressure in the abdomen was reduced to 8 mmHg with no further bleeding being noted from the liver bed. The remainder of the gallbladder was dissected free.  It was placed into an Endobag and brought out intact through the epigastric port.     All areas were visualized for bleeding and bile leak. None was seen. The patient was then placed supine. Trocars were removed and the abdomen was allowed to flatten. All port sites were closed with 4-0 subcuticular on the skin.    The procedure was terminated. It was well tolerated. Sponge and needle counts were correct, and the patient was transferred to recovery in satisfactory condition.            This document has been electronically signed by Dirk Leigh MD on September 11, 2018 8:38 AM       Date: 9/11/2018  Time: 8:38 AM

## 2018-09-11 NOTE — ANESTHESIA POSTPROCEDURE EVALUATION
Patient: Humera Swartz    Procedure Summary     Date:  09/11/18 Room / Location:  [facility] OR 03 / [facility] OR    Anesthesia Start:  0713 Anesthesia Stop:  0858    Procedure:  LAPAROSCOPIC POSSIBLE OPEN CHOLECYSTECTOMY WITH INTRAOPERATIVE CHOLANGIOGRAM    (C-Arm # 1) (N/A Abdomen) Diagnosis:       Gallstones and inflammation of gallbladder without obstruction      (Gallstones and inflammation of gallbladder without obstruction [K80.00])    Surgeon:  Dirk Leigh MD Provider:  Reggie Pettit MD    Anesthesia Type:  general ASA Status:  3          Anesthesia Type: general  Last vitals  BP   149/60 (09/11/18 0852)   Temp   97.1 °F (36.2 °C) (09/11/18 0852)   Pulse   69 (09/11/18 0852)   Resp   20 (09/11/18 0852)     SpO2   94 % (09/11/18 0852)     Post Anesthesia Care and Evaluation    Patient location during evaluation: PACU  Patient participation: complete - patient cannot participate  Level of consciousness: obtunded/minimal responses  Pain score: 0  Pain management: adequate  Airway patency: patent  Anesthetic complications: No anesthetic complications  PONV Status: none  Cardiovascular status: acceptable  Respiratory status: acceptable  Hydration status: acceptable

## 2018-09-11 NOTE — ANESTHESIA PREPROCEDURE EVALUATION
Anesthesia Evaluation     Patient summary reviewed and Nursing notes reviewed   history of anesthetic complications (Scopalamine patch applied pre-op. ): PONV  NPO Solid Status: > 8 hours  NPO Liquid Status: > 4 hours           Airway   Mallampati: II  TM distance: >3 FB  Neck ROM: full  possible difficult intubation  Dental - normal exam     Pulmonary - normal exam    breath sounds clear to auscultation  (+) a smoker Former,     ROS comment: COMPARISON: 10/29/2016     FINDINGS: PA lateral chest. The bony structures are intact. The  cardiomediastinal silhouette is unremarkable. Lungs are clear. No  pneumothorax or pleural effusion.     IMPRESSION:  No acute cardiopulmonary abnormality.     Electronically signed by:  Hosea Adkins MD  6/17/2017 3:35 AM  Cardiovascular - normal exam    ECG reviewed  Rhythm: regular  Rate: normal    (+) hyperlipidemia,   (-) murmur    ROS comment: FINAL IMPRESSION:  1.   Normal LV systolic function. Ejection fraction of 55% to 60%.  2.   Grade 1 diastolic dysfunction of the left ventricular myocardium.  3.   Mild left atrial enlargement.  4.   No evidence of pericardial effusion.        VALDO YI M.D.  Electronically Signed  07/16/2016 14:50:39                                                                           Normal sinus rhythm  Normal ECG    Confirmed by ELIZABETH KHAN, QUETA ([id number]),  GWENDOLYN SMITH ([id number]) on  9/3/2018 6:24:34 PM    Neuro/Psych  (+) psychiatric history Anxiety and Depression,     GI/Hepatic/Renal/Endo    (+)  GERD well controlled,      Musculoskeletal     (+) myalgias (Fibromyalgia.),   Abdominal    Substance History - negative use     OB/GYN negative ob/gyn ROS         Other      history of cancer (Right breast cancer treated.) remission                  Anesthesia Plan    ASA 3     general     intravenous induction   Anesthetic plan, all risks, benefits, and alternatives have been provided, discussed and informed consent has been obtained with: patient  and spouse/significant other.

## 2018-09-12 LAB
LAB AP CASE REPORT: NORMAL
PATH REPORT.FINAL DX SPEC: NORMAL
PATH REPORT.GROSS SPEC: NORMAL

## 2018-09-19 ENCOUNTER — OFFICE VISIT (OUTPATIENT)
Dept: SURGERY | Facility: CLINIC | Age: 63
End: 2018-09-19

## 2018-09-19 VITALS
HEIGHT: 66 IN | DIASTOLIC BLOOD PRESSURE: 70 MMHG | WEIGHT: 175.8 LBS | BODY MASS INDEX: 28.25 KG/M2 | HEART RATE: 74 BPM | SYSTOLIC BLOOD PRESSURE: 122 MMHG | TEMPERATURE: 97.7 F

## 2018-09-19 DIAGNOSIS — Z90.49 STATUS POST LAPAROSCOPIC CHOLECYSTECTOMY: Primary | ICD-10-CM

## 2018-09-19 PROCEDURE — 99024 POSTOP FOLLOW-UP VISIT: CPT | Performed by: SURGERY

## 2018-09-19 NOTE — PATIENT INSTRUCTIONS

## 2018-09-20 PROBLEM — Z90.49 STATUS POST LAPAROSCOPIC CHOLECYSTECTOMY: Status: ACTIVE | Noted: 2018-09-20

## 2018-09-20 PROBLEM — K80.20 GALLSTONES: Status: RESOLVED | Noted: 2018-01-15 | Resolved: 2018-09-20

## 2018-09-21 NOTE — PROGRESS NOTES
CHIEF COMPLAINT:   Chief Complaint   Patient presents with   • Post-op Follow-up     Post operative laparoscopic cholecystectomy 9-11-18.       HPI: This patient presents for a post-operative visit after undergoing a laparoscopic  cholecystectomy.  Patient reports no problems. Eating well without any significant nausea. Having good bowel function. No problems with constipation or diarrhea. No urinary complaints. Denies fever. Ambulating well and slowly returning to normal activities.    PATHOLOGY:   Tissue Pathology Exam   Order: [id number]   Status:  Final result   Visible to patient:  No (Not Released)   Dx:  Gallstones and inflammation of gallbl...   Component 9d ago   Final Diagnosis   CHRONIC CHOLECYSTITIS WITH CHOLELITHIASIS AND CHOLESTEROLOSIS.   Electronically signed by Ashish Larson MD on 9/12/2018 at 1544   Gross Description    The specimen consists of a pear-shaped gallbladder measuring 5.9 x 2.6 x 2.0 cm.  Their external surfaces are smooth and on further examination, its wall is of the usual thickness.  The mucosa shows evidence of cholesterolosis and multiple minute stones measure up to 0.2 cm in greatest dimension are obtained from the lumen.  A representative section is embedded as 1A.             PHYSICAL EXAM:    ABD: Incisions are healing well without any erythema or signs of infection.    ASSESSMENT:    Humera was seen today for post-op follow-up.    Diagnoses and all orders for this visit:    Status post laparoscopic cholecystectomy        PLAN:    1.The patient will follow-up on a prn basis unless there are any problems.  2. May shower.   3. May return to normal activity without restrictions.          This document has been electronically signed by Dirk Leigh MD on September 20, 2018 10:53 PM

## 2019-01-28 RX ORDER — DEXLANSOPRAZOLE 60 MG/1
CAPSULE, DELAYED RELEASE ORAL
Qty: 90 CAPSULE | Refills: 2 | OUTPATIENT
Start: 2019-01-28

## 2019-01-28 RX ORDER — DEXLANSOPRAZOLE 60 MG/1
60 CAPSULE, DELAYED RELEASE ORAL NIGHTLY
Qty: 30 CAPSULE | Refills: 0 | Status: SHIPPED | OUTPATIENT
Start: 2019-01-28 | End: 2019-02-24 | Stop reason: SDUPTHER

## 2019-02-19 ENCOUNTER — OFFICE VISIT (OUTPATIENT)
Dept: ONCOLOGY | Facility: CLINIC | Age: 64
End: 2019-02-19

## 2019-02-19 VITALS
TEMPERATURE: 98.4 F | DIASTOLIC BLOOD PRESSURE: 71 MMHG | HEIGHT: 66 IN | HEART RATE: 64 BPM | SYSTOLIC BLOOD PRESSURE: 133 MMHG | WEIGHT: 175 LBS | RESPIRATION RATE: 18 BRPM | OXYGEN SATURATION: 98 % | BODY MASS INDEX: 28.12 KG/M2

## 2019-02-19 DIAGNOSIS — Z85.3 HX: BREAST CANCER: Primary | ICD-10-CM

## 2019-02-19 DIAGNOSIS — Z12.31 ENCOUNTER FOR SCREENING MAMMOGRAM FOR MALIGNANT NEOPLASM OF BREAST: ICD-10-CM

## 2019-02-19 PROCEDURE — 85025 COMPLETE CBC W/AUTO DIFF WBC: CPT | Performed by: NURSE PRACTITIONER

## 2019-02-19 PROCEDURE — 80053 COMPREHEN METABOLIC PANEL: CPT | Performed by: NURSE PRACTITIONER

## 2019-02-19 PROCEDURE — G0463 HOSPITAL OUTPT CLINIC VISIT: HCPCS | Performed by: NURSE PRACTITIONER

## 2019-02-19 PROCEDURE — 99214 OFFICE O/P EST MOD 30 MIN: CPT | Performed by: NURSE PRACTITIONER

## 2019-02-19 NOTE — PROGRESS NOTES
DATE OF VISIT: 2/19/2019    REASON FOR VISIT:  Yearly follow-up for breast cancer surveillance    HISTORY OF PRESENT ILLNESS:  64-year-old female with a past medical history significant for right-sided breast cancer, stage III diagnosed in 2008, status post mastectomy followed by chemotherapy and radiation. She completed 10 yrs of adjuvant hormonal therapy with Tamoxifen followed by Arimidex. She stopped Arimidex in January 2018. She was last seen here in February 2018 and was placed on yearly follow-up.     She recently had screening left mammogram and here today for results. She is requesting referral to Sheila Lee NP to establish new primary care provider.         PAST MEDICAL HISTORY:    Past Medical History:   Diagnosis Date   • Acquired equinus deformity of foot     ANKLE   • Anxiety    • Breast cancer (CMS/HCC)    • Cancer (CMS/HCC)     BREAST   • Depression    • Diverticular disease of colon    • Drug therapy    • Esophagitis    • Fibrocystic breast    • Gall stone    • GERD (gastroesophageal reflux disease)    • History of bone density study 01/01/2012    NORMAL   • History of echocardiogram 07/11/2016    Normal LV systolic function.Ef of 55-60%.Grade 1 diastolic dysfunciton of the LV myocardium.Mild left atiral enlargement.No evidence of pericardial effusion   • History of mammogram 07/18/2011    one breast (Benign finding.)   • History of Papanicolaou smear of cervix 06/30/2011    NEGATIVE   • Hx of radiation therapy    • Hyperlipidemia    • Injury of head and neck    • Minor head injury    • Personal history of malignant neoplasm of breast    • Plantar fasciitis    • Primary fibromyalgia syndrome    • Solitary pulmonary nodule present on computed tomography of lung        SOCIAL HISTORY:    Social History     Tobacco Use   • Smoking status: Former Smoker   • Smokeless tobacco: Never Used   • Tobacco comment: Ceased Smoking 12 Years Prior   Substance Use Topics   • Alcohol use: No   • Drug use: No        Surgical History :  Past Surgical History:   Procedure Laterality Date   • BREAST SURGERY      Carcinoma of the right breast;Mastectomy   •  SECTION     • CHOLECYSTECTOMY WITH INTRAOPERATIVE CHOLANGIOGRAM N/A 2018    Procedure: LAPAROSCOPIC POSSIBLE OPEN CHOLECYSTECTOMY WITH INTRAOPERATIVE CHOLANGIOGRAM    (C-Arm # 1);  Surgeon: Dirk Leigh MD;  Location: [facility];  Service: General   • COLONOSCOPY  2016    Normal colon.No specimens collected   • DIAGNOSTIC LAPAROSCOPY  1977    (Amenorrhea, probable polycystic ovaries. Polycystic ovaries   • ESOPHAGOSCOPY / EGD  2016    Mildly severe esophagitis.Gastritis.Normal examined duodenum.Medium sized hiatus hernia   • EXCISION BREAST LESION W/ PREOP NEEDLE LOC  1987    Bilateral excision of breast masses. Bilateral breast masses; probable fibrocystic disease.   • HYSTEROSCOPY  2011    Exam under anesthesia, diagnostic hysterectomy with fractional dilation and curettage. Thickened endometrial stripe, on Tamoxifen therapy.   • MASTECTOMY Right    • OTHER SURGICAL HISTORY  2016    NEEDLE BIOPSY LYMPH NODES; Ultrasound directed core needle biopsy of the left axilla.   • TUBAL ABDOMINAL LIGATION         ALLERGIES:    Allergies   Allergen Reactions   • Erythromycin Other (See Comments)     SEVERE WEAKNESS   • Phenergan [Promethazine Hcl] Other (See Comments)     weakness   • Propofol Other (See Comments)     COUGH/WHEEZE  Runny nose   • Tetracyclines & Related GI Intolerance       REVIEW OF SYSTEMS:      CONSTITUTIONAL:  No fever, chills, or night sweats.     HEENT:  No epistaxis, mouth sores, or difficulty swallowing.    RESPIRATORY:  No new shortness of breath or cough at present.    CARDIOVASCULAR:  No chest pain or palpitations.    GASTROINTESTINAL:  No abdominal pain, nausea, vomiting, or blood in the stool.    GENITOURINARY:  No dysuria or hematuria.    MUSCULOSKELETAL:  No any new back pain or arthralgias.  "    NEUROLOGICAL:  No tingling or numbness. No new headache or dizziness.     LYMPHATICS:  Denies any abnormal swollen and anywhere in the body.    SKIN:  Denies any new skin rash.    PHYSICAL EXAMINATION:      VITAL SIGNS:  /71   Pulse 64   Temp 98.4 °F (36.9 °C) (Temporal)   Resp 18   Ht 167.6 cm (65.98\")   Wt 79.4 kg (175 lb)   SpO2 98%   BMI 28.26 kg/m²     GENERAL:  Not in any distress.    HEENT:  Normocephalic, Atraumatic.Mild Conjunctival pallor. No icterus.  No Facial Asymmetry noted.    NECK:  No adenopathy. No JVD.    RESPIRATORY:  Fair air entry bilateral. No rhonchi or wheezing.    CARDIOVASCULAR:  S1, S2. Regular rate and rhythm. No murmur or gallop appreciated.    ABDOMEN:  Soft, obese, nontender. Bowel sounds present in all four quadrants.  No organomegaly appreciated.    EXTREMITIES:  No edema.No Calf Tenderness.    NEUROLOGIC:  Alert, awake and oriented ×3.    SKIN : No new skin lesion identified  DIAGNOSTIC DATA:    Glucose   Date Value Ref Range Status   09/03/2018 142 (H) 60 - 100 mg/dL Final     Sodium   Date Value Ref Range Status   09/03/2018 141 137 - 145 mmol/L Final     Potassium   Date Value Ref Range Status   09/03/2018 3.8 3.5 - 5.1 mmol/L Final     CO2   Date Value Ref Range Status   09/03/2018 28.0 22.0 - 31.0 mmol/L Final     Chloride   Date Value Ref Range Status   09/03/2018 103 95 - 110 mmol/L Final     Anion Gap   Date Value Ref Range Status   09/03/2018 10.0 5.0 - 15.0 mmol/L Final     Creatinine   Date Value Ref Range Status   09/03/2018 0.85 0.50 - 1.00 mg/dL Final     BUN   Date Value Ref Range Status   09/03/2018 20 7 - 21 mg/dL Final     BUN/Creatinine Ratio   Date Value Ref Range Status   09/03/2018 23.5 7.0 - 25.0 Final     Calcium   Date Value Ref Range Status   09/03/2018 9.8 8.4 - 10.2 mg/dL Final     eGFR Non  Amer   Date Value Ref Range Status   09/03/2018 68 45 - 104 mL/min/1.73 Final     Alkaline Phosphatase   Date Value Ref Range Status "   09/03/2018 115 38 - 126 U/L Final     Total Protein   Date Value Ref Range Status   09/03/2018 7.2 6.3 - 8.6 g/dL Final     ALT (SGPT)   Date Value Ref Range Status   09/03/2018 53 (H) 9 - 52 U/L Final     AST (SGOT)   Date Value Ref Range Status   09/03/2018 84 (H) 14 - 36 U/L Final     Total Bilirubin   Date Value Ref Range Status   09/03/2018 0.7 0.2 - 1.3 mg/dL Final     Albumin   Date Value Ref Range Status   09/03/2018 4.00 3.40 - 4.80 g/dL Final     Globulin   Date Value Ref Range Status   09/03/2018 3.2 2.3 - 3.5 gm/dL Final     Lab Results   Component Value Date    WBC 6.00 02/19/2019    HGB 14.4 02/19/2019    HCT 42.1 02/19/2019    MCV 86.8 02/19/2019     02/19/2019     Lab Results   Component Value Date    NEUTROABS 3.72 02/19/2019    IRON 42 11/23/2016    TIBC 338 11/23/2016    LABIRON 12.4 (L) 11/23/2016     Lab Results   Component Value Date    LABCA2 22.9 01/10/2017   ]      RADIOLOGY DATA : Screening left mammogram 2/11/2019:      PROCEDURE: Left diagnostic mammogram with 3-D tomosynthesis with  CAD     REASON FOR EXAM: Screening mammography. History of right breast  carcinoma with mastectomy. Left breast pain and tenderness upper  outer quadrant. Questionable palpable abnormality which is no  longer felt by the patient.     FINDINGS: Comparison study dated 5/4/2018, 3/10/2017, 10/3/2016,  7/30/2015, 1/13/2015, and 1/14/2014.No interval change in  appearance of breast parenchyma. No suspicious mass or malignant  type microcalcifications . No skin thickening or retraction. .     Parenchymal pattern: Scattered fibroglandular densities     IMPRESSION:  No mammographic evidence of malignancy.     BI-RADS category 2: Benign findings.     Recommendation: Annual mammography   No images are attached to the encounter.      ASSESSMENT AND PLAN:    1. Infiltrating ductal carcinoma of right breast, stage III, T2 N2, ER positive ER positive HER-2/ary negative by fish diagnosed on March 10, 2007.  Status  post mastectomy on right side with lymph node dissection.  Patient received adjuvant chemotherapy in form of Adriamycin and Cytoxan followed by Taxol.  Subsequently patient received adjuvant radiation therapy .  Patient initially received tamoxifen from 2008 until January 2013.  After that in view of 4 lymph node being positive she was changed over to Arimidex in January 2013 and completed 5 additional yrs of therapy; completed in January 2018. She is currently on yearly observation. Her most recent screening left breast mammogram was BI-RADS 2. Will plan to continue with yearly screening left mammogram and see her once yearly; pt educated on monthly self breast exam.      2. Hypertension, /71     3. Health maintenance, she does not smoke and colonoscopy was in 2016.     Patient's Body mass index is 28.26 kg/m². BMI is above normal parameters. Recommendations include: exercise counseling and nutrition counseling.         This document has been signed by DAVID Ferguson on February 19, 2019 12:08 PM

## 2019-02-26 RX ORDER — DEXLANSOPRAZOLE 60 MG/1
CAPSULE, DELAYED RELEASE ORAL
Qty: 30 CAPSULE | Refills: 0 | Status: SHIPPED | OUTPATIENT
Start: 2019-02-26 | End: 2019-02-27 | Stop reason: SDUPTHER

## 2019-02-27 ENCOUNTER — OFFICE VISIT (OUTPATIENT)
Dept: GASTROENTEROLOGY | Facility: CLINIC | Age: 64
End: 2019-02-27

## 2019-02-27 VITALS
SYSTOLIC BLOOD PRESSURE: 128 MMHG | HEIGHT: 66 IN | DIASTOLIC BLOOD PRESSURE: 70 MMHG | BODY MASS INDEX: 27.71 KG/M2 | WEIGHT: 172.4 LBS | HEART RATE: 69 BPM

## 2019-02-27 DIAGNOSIS — R10.10 UPPER ABDOMINAL PAIN: Primary | ICD-10-CM

## 2019-02-27 DIAGNOSIS — K21.00 GASTROESOPHAGEAL REFLUX DISEASE WITH ESOPHAGITIS: ICD-10-CM

## 2019-02-27 PROCEDURE — 99214 OFFICE O/P EST MOD 30 MIN: CPT | Performed by: NURSE PRACTITIONER

## 2019-02-27 RX ORDER — SERTRALINE HYDROCHLORIDE 100 MG/1
100 TABLET, FILM COATED ORAL DAILY
Refills: 2 | COMMUNITY
Start: 2019-02-02 | End: 2019-07-03

## 2019-02-27 RX ORDER — DEXLANSOPRAZOLE 60 MG/1
60 CAPSULE, DELAYED RELEASE ORAL DAILY
Qty: 30 CAPSULE | Refills: 11 | Status: SHIPPED | OUTPATIENT
Start: 2019-02-27 | End: 2020-03-17

## 2019-02-27 RX ORDER — NITROFURANTOIN MACROCRYSTALS 100 MG/1
100 CAPSULE ORAL DAILY
COMMUNITY
End: 2022-03-22

## 2019-02-27 NOTE — PATIENT INSTRUCTIONS
Heartburn  Heartburn is a type of pain or discomfort that can happen in the throat or chest. It is often described as a burning pain. It may also cause a bad taste in the mouth. Heartburn may feel worse when you lie down or bend over, and it is often worse at night. Heartburn may be caused by stomach contents that move back up into the esophagus (reflux).  Follow these instructions at home:  Take these actions to decrease your discomfort and to help avoid complications.  Diet  · Follow a diet as recommended by your health care provider. This may involve avoiding foods and drinks such as:  ? Coffee and tea (with or without caffeine).  ? Drinks that contain alcohol.  ? Energy drinks and sports drinks.  ? Carbonated drinks or sodas.  ? Chocolate and cocoa.  ? Peppermint and mint flavorings.  ? Garlic and onions.  ? Horseradish.  ? Spicy and acidic foods, including peppers, chili powder, adams powder, vinegar, hot sauces, and barbecue sauce.  ? Citrus fruit juices and citrus fruits, such as oranges, kilo, and limes.  ? Tomato-based foods, such as red sauce, chili, salsa, and pizza with red sauce.  ? Fried and fatty foods, such as donuts, french fries, potato chips, and high-fat dressings.  ? High-fat meats, such as hot dogs and fatty cuts of red and white meats, such as rib eye steak, sausage, ham, and negrete.  ? High-fat dairy items, such as whole milk, butter, and cream cheese.  · Eat small, frequent meals instead of large meals.  · Avoid drinking large amounts of liquid with your meals.  · Avoid eating meals during the 2-3 hours before bedtime.  · Avoid lying down right after you eat.  · Do not exercise right after you eat.  General instructions  · Pay attention to any changes in your symptoms.  · Take over-the-counter and prescription medicines only as told by your health care provider. Do not take aspirin, ibuprofen, or other NSAIDs unless your health care provider told you to do so.  · Do not use any tobacco  products, including cigarettes, chewing tobacco, and e-cigarettes. If you need help quitting, ask your health care provider.  · Wear loose-fitting clothing. Do not wear anything tight around your waist that causes pressure on your abdomen.  · Raise (elevate) the head of your bed about 6 inches (15 cm).  · Try to reduce your stress, such as with yoga or meditation. If you need help reducing stress, ask your health care provider.  · If you are overweight, reduce your weight to an amount that is healthy for you. Ask your health care provider for guidance about a safe weight loss goal.  · Keep all follow-up visits as told by your health care provider. This is important.  Contact a health care provider if:  · You have new symptoms.  · You have unexplained weight loss.  · You have difficulty swallowing, or it hurts to swallow.  · You have wheezing or a persistent cough.  · Your symptoms do not improve with treatment.  · You have frequent heartburn for more than two weeks.  Get help right away if:  · You have pain in your arms, neck, jaw, teeth, or back.  · You feel sweaty, dizzy, or light-headed.  · You have chest pain or shortness of breath.  · You vomit and your vomit looks like blood or coffee grounds.  · Your stool is bloody or black.  This information is not intended to replace advice given to you by your health care provider. Make sure you discuss any questions you have with your health care provider.  Document Released: 05/06/2010 Document Revised: 05/25/2017 Document Reviewed: 04/13/2016  [employer] Interactive Patient Education © 2018 [employer]

## 2019-02-27 NOTE — PROGRESS NOTES
Chief Complaint   Patient presents with   • Heartburn   • Abdominal Pain       Subjective    Humera Swartz is a 64 y.o. female. she is here today for follow-up.  64-year-old female presents for follow-up regarding chronic reflux, hiatal hernia and upper abdominal pain.  Reports she ran out of Dexilant try to go without it and symptoms became severe.  Reports she has intermittent upper abdominal pain with radiation to her back mostly in her right upper quadrant but occasionally radiates to her left upper quadrant.  Previously she underwent EGD and colonoscopy February 2016 which EGD noted mildly severe esophagitis, gastritis and a medium-sized hiatal hernia.  Colonoscopy no diverticulosis otherwise normal repeat recommended in 2021 for surveillance.    Heartburn   She complains of abdominal pain (ruq pain ) and heartburn. She reports no nausea or no sore throat. This is a chronic problem. The problem has been waxing and waning. The heartburn duration is less than a minute. Pertinent negatives include no fatigue. She has tried a PPI for the symptoms.   Abdominal Pain   Pertinent negatives include no arthralgias, constipation, diarrhea, fever, nausea or vomiting. Her past medical history is significant for GERD.     Plan; obtain ultrasound abdomen complete due to abdominal pain on exam.  Continue Dexilant daily.  Recommend standard antireflux measures and avoidance of gastric irritants.       The following portions of the patient's history were reviewed and updated as appropriate:   Past Medical History:   Diagnosis Date   • Acquired equinus deformity of foot     ANKLE   • Anxiety    • Breast cancer (CMS/HCC)    • Cancer (CMS/HCC)     BREAST   • Depression    • Diverticular disease of colon    • Drug therapy    • Esophagitis    • Fibrocystic breast    • Gall stone    • GERD (gastroesophageal reflux disease)    • History of bone density study 01/01/2012    NORMAL   • History of echocardiogram 07/11/2016    Normal LV  systolic function.Ef of 55-60%.Grade 1 diastolic dysfunciton of the LV myocardium.Mild left atiral enlargement.No evidence of pericardial effusion   • History of mammogram 2011    one breast (Benign finding.)   • History of Papanicolaou smear of cervix 2011    NEGATIVE   • Hx of radiation therapy    • Hyperlipidemia    • Injury of head and neck    • Minor head injury    • Personal history of malignant neoplasm of breast    • Plantar fasciitis    • Primary fibromyalgia syndrome    • Solitary pulmonary nodule present on computed tomography of lung      Past Surgical History:   Procedure Laterality Date   • BREAST SURGERY      Carcinoma of the right breast;Mastectomy   •  SECTION     • CHOLECYSTECTOMY WITH INTRAOPERATIVE CHOLANGIOGRAM N/A 2018    Procedure: LAPAROSCOPIC POSSIBLE OPEN CHOLECYSTECTOMY WITH INTRAOPERATIVE CHOLANGIOGRAM    (C-Arm # 1);  Surgeon: Dirk Leigh MD;  Location: [facility];  Service: General   • COLONOSCOPY  2016    Normal colon.No specimens collected   • DIAGNOSTIC LAPAROSCOPY  1977    (Amenorrhea, probable polycystic ovaries. Polycystic ovaries   • ESOPHAGOSCOPY / EGD  2016    Mildly severe esophagitis.Gastritis.Normal examined duodenum.Medium sized hiatus hernia   • EXCISION BREAST LESION W/ PREOP NEEDLE LOC  1987    Bilateral excision of breast masses. Bilateral breast masses; probable fibrocystic disease.   • HYSTEROSCOPY  2011    Exam under anesthesia, diagnostic hysterectomy with fractional dilation and curettage. Thickened endometrial stripe, on Tamoxifen therapy.   • MASTECTOMY Right    • OTHER SURGICAL HISTORY  2016    NEEDLE BIOPSY LYMPH NODES; Ultrasound directed core needle biopsy of the left axilla.   • TUBAL ABDOMINAL LIGATION       Family History   Problem Relation Age of Onset   • Diabetes Other    • Arthritis Other    • Breast cancer Maternal Aunt      OB History      Para Term  AB Living    3 2 2   1 2     SAB TAB Ectopic Molar Multiple Live Births    1                  Prior to Admission medications    Medication Sig Start Date End Date Taking? Authorizing Provider   B Complex-C-E-Zn (B COMPLEX-C-E-ZINC) tablet Take 1 tablet by mouth Daily.   Yes Daniele Russo MD   busPIRone (BUSPAR) 5 MG tablet Take 2.5 mg by mouth Every Night.   Yes Daniele Russo MD   CBD (cannabidiol) oral oil Take  by mouth Daily.   Yes Daniele Russo MD   Cholecalciferol (VITAMIN D-3 PO) Take 1 tablet by mouth Daily.   Yes Daniele Russo MD   DEXILANT 60 MG capsule TAKE 1 CAPSULE BY MOUTH EVERY DAY AT NIGHT 2/26/19  Yes Dorita Spann APRN   fluticasone (FLONASE) 50 MCG/ACT nasal spray 2 sprays into the nostril(s) as directed by provider Daily As Needed for Rhinitis.   Yes Daniele Russo MD   nitrofurantoin (MACRODANTIN) 100 MG capsule Take 100 mg by mouth 4 (Four) Times a Day.   Yes Daniele Russo MD   Probiotic Product (PROBIOTIC DAILY PO) Take 1 capsule by mouth Daily.   Yes Daniele Russo MD   sertraline (ZOLOFT) 50 MG tablet Take 50 mg by mouth Every Night.   Yes Daniele Russo MD   vitamin A 84791 UNIT capsule Take 10,000 Units by mouth Daily.   Yes Daniele Russo MD   VITAMIN K, PHYTONADIONE, PO Take  by mouth Daily.   Yes Daniele Russo MD   sertraline (ZOLOFT) 100 MG tablet Take 100 mg by mouth Daily. 2/2/19   Daniele Russo MD   nitrofurantoin (MACRODANTIN) 100 MG capsule Take 100 mg by mouth Daily.  2/27/19  Daniele Russo MD     Allergies   Allergen Reactions   • Erythromycin Other (See Comments)     SEVERE WEAKNESS   • Phenergan [Promethazine Hcl] Other (See Comments)     weakness   • Propofol Other (See Comments)     COUGH/WHEEZE  Runny nose   • Tetracyclines & Related GI Intolerance     Social History     Socioeconomic History   • Marital status: Single     Spouse name: Not on file   • Number of children: Not on file   • Years of education: Not  "on file   • Highest education level: Not on file   Tobacco Use   • Smoking status: Former Smoker   • Smokeless tobacco: Never Used   • Tobacco comment: Ceased Smoking 12 Years Prior   Substance and Sexual Activity   • Alcohol use: No   • Drug use: No   • Sexual activity: Defer       Review of Systems  Review of Systems   Constitutional: Negative for activity change, appetite change, chills, diaphoresis, fatigue, fever and unexpected weight change.   HENT: Negative for sore throat and trouble swallowing.    Respiratory: Negative for shortness of breath.    Gastrointestinal: Positive for abdominal pain (ruq pain ) and heartburn. Negative for abdominal distention, anal bleeding, blood in stool, constipation, diarrhea, nausea, rectal pain and vomiting.   Musculoskeletal: Negative for arthralgias.   Skin: Negative for pallor.   Neurological: Negative for light-headedness.        /70 (BP Location: Left arm)   Pulse 69   Ht 167.6 cm (66\")   Wt 78.2 kg (172 lb 6.4 oz)   BMI 27.83 kg/m²     Objective    Physical Exam   Constitutional: She is oriented to person, place, and time. She appears well-developed and well-nourished. She is cooperative. No distress.   HENT:   Head: Normocephalic and atraumatic.   Neck: Normal range of motion. Neck supple. No thyromegaly present.   Cardiovascular: Normal rate, regular rhythm and normal heart sounds.   Pulmonary/Chest: Effort normal and breath sounds normal. She has no wheezes. She has no rhonchi. She has no rales.   Abdominal: Soft. Normal appearance and bowel sounds are normal. She exhibits no distension. There is no hepatosplenomegaly. There is tenderness in the right upper quadrant, epigastric area and left upper quadrant. There is no rigidity and no guarding. No hernia.   Lymphadenopathy:     She has no cervical adenopathy.   Neurological: She is alert and oriented to person, place, and time.   Skin: Skin is warm, dry and intact. No rash noted. No pallor.   Psychiatric: " She has a normal mood and affect. Her speech is normal.     Lab on 02/19/2019   Component Date Value Ref Range Status   • Glucose 02/19/2019 94  60 - 100 mg/dL Final   • BUN 02/19/2019 19  7 - 21 mg/dL Final   • Creatinine 02/19/2019 0.62  0.50 - 1.00 mg/dL Final   • Sodium 02/19/2019 137  137 - 145 mmol/L Final   • Potassium 02/19/2019 4.2  3.5 - 5.1 mmol/L Final   • Chloride 02/19/2019 101  95 - 110 mmol/L Final   • CO2 02/19/2019 27.0  22.0 - 31.0 mmol/L Final   • Calcium 02/19/2019 9.9  8.4 - 10.2 mg/dL Final   • Total Protein 02/19/2019 8.0  6.3 - 8.6 g/dL Final   • Albumin 02/19/2019 4.70  3.40 - 4.80 g/dL Final   • ALT (SGPT) 02/19/2019 18  9 - 52 U/L Final   • AST (SGOT) 02/19/2019 27  14 - 36 U/L Final   • Alkaline Phosphatase 02/19/2019 100  38 - 126 U/L Final   • Total Bilirubin 02/19/2019 0.9  0.2 - 1.3 mg/dL Final   • eGFR Non African Amer 02/19/2019 98  45 - 104 mL/min/1.73 Final   • Globulin 02/19/2019 3.3  2.3 - 3.5 gm/dL Final   • A/G Ratio 02/19/2019 1.4  1.1 - 1.8 g/dL Final   • BUN/Creatinine Ratio 02/19/2019 30.6* 7.0 - 25.0 Final   • Anion Gap 02/19/2019 9.0  5.0 - 15.0 mmol/L Final   • WBC 02/19/2019 6.00  3.40 - 10.80 10*3/mm3 Final   • RBC 02/19/2019 4.85  3.77 - 5.28 10*6/mm3 Final   • Hemoglobin 02/19/2019 14.4  12.0 - 15.9 g/dL Final   • Hematocrit 02/19/2019 42.1  34.0 - 46.6 % Final   • MCV 02/19/2019 86.8  79.0 - 97.0 fL Final   • MCH 02/19/2019 29.7  26.6 - 33.0 pg Final   • MCHC 02/19/2019 34.2  31.5 - 35.7 g/dL Final   • RDW 02/19/2019 13.2  12.3 - 15.4 % Final   • RDW-SD 02/19/2019 41.1  37.0 - 54.0 fl Final   • MPV 02/19/2019 10.0  6.0 - 12.0 fL Final   • Platelets 02/19/2019 237  140 - 450 10*3/mm3 Final   • Neutrophil % 02/19/2019 62.0  42.7 - 76.0 % Final   • Lymphocyte % 02/19/2019 27.8  19.6 - 45.3 % Final   • Monocyte % 02/19/2019 7.7  5.0 - 12.0 % Final   • Eosinophil % 02/19/2019 1.5  0.3 - 6.2 % Final   • Basophil % 02/19/2019 0.5  0.0 - 1.5 % Final   • Immature Grans %  02/19/2019 0.5  0.0 - 0.5 % Final   • Neutrophils, Absolute 02/19/2019 3.72  1.40 - 7.00 10*3/mm3 Final   • Lymphocytes, Absolute 02/19/2019 1.67  0.70 - 3.10 10*3/mm3 Final   • Monocytes, Absolute 02/19/2019 0.46  0.10 - 0.90 10*3/mm3 Final   • Eosinophils, Absolute 02/19/2019 0.09  0.00 - 0.40 10*3/mm3 Final   • Basophils, Absolute 02/19/2019 0.03  0.00 - 0.20 10*3/mm3 Final   • Immature Grans, Absolute 02/19/2019 0.03  0.00 - 0.05 10*3/mm3 Final   • nRBC 02/19/2019 0.0  0.0 - 0.0 /100 WBC Final     Assessment/Plan      1. Upper abdominal pain    2. Gastroesophageal reflux disease with esophagitis    .       Orders placed during this encounter include:  Orders Placed This Encounter   Procedures   • US Abdomen Complete     Standing Status:   Future     Standing Expiration Date:   2/27/2020     Order Specific Question:   Reason for Exam:     Answer:   abd pain       * Surgery not found *    Review and/or summary of lab tests, radiology, procedures, medications. Review and summary of old records and obtaining of history. The risks and benefits of my recommendations, as well as other treatment options were discussed with the patient today. Questions were answered.    New Medications Ordered This Visit   Medications   • dexlansoprazole (DEXILANT) 60 MG capsule     Sig: Take 1 capsule by mouth Daily.     Dispense:  30 capsule     Refill:  11       Follow-up: Return in about 1 year (around 2/27/2020).          This document has been electronically signed by DAVID Ho on February 27, 2019 2:52 PM             Results for orders placed or performed in visit on 02/19/19   CBC Auto Differential   Result Value Ref Range    WBC 6.00 3.40 - 10.80 10*3/mm3    RBC 4.85 3.77 - 5.28 10*6/mm3    Hemoglobin 14.4 12.0 - 15.9 g/dL    Hematocrit 42.1 34.0 - 46.6 %    MCV 86.8 79.0 - 97.0 fL    MCH 29.7 26.6 - 33.0 pg    MCHC 34.2 31.5 - 35.7 g/dL    RDW 13.2 12.3 - 15.4 %    RDW-SD 41.1 37.0 - 54.0 fl    MPV 10.0 6.0 - 12.0 fL     Platelets 237 140 - 450 10*3/mm3    Neutrophil % 62.0 42.7 - 76.0 %    Lymphocyte % 27.8 19.6 - 45.3 %    Monocyte % 7.7 5.0 - 12.0 %    Eosinophil % 1.5 0.3 - 6.2 %    Basophil % 0.5 0.0 - 1.5 %    Immature Grans % 0.5 0.0 - 0.5 %    Neutrophils, Absolute 3.72 1.40 - 7.00 10*3/mm3    Lymphocytes, Absolute 1.67 0.70 - 3.10 10*3/mm3    Monocytes, Absolute 0.46 0.10 - 0.90 10*3/mm3    Eosinophils, Absolute 0.09 0.00 - 0.40 10*3/mm3    Basophils, Absolute 0.03 0.00 - 0.20 10*3/mm3    Immature Grans, Absolute 0.03 0.00 - 0.05 10*3/mm3    nRBC 0.0 0.0 - 0.0 /100 WBC   Comprehensive Metabolic Panel   Result Value Ref Range    Glucose 94 60 - 100 mg/dL    BUN 19 7 - 21 mg/dL    Creatinine 0.62 0.50 - 1.00 mg/dL    Sodium 137 137 - 145 mmol/L    Potassium 4.2 3.5 - 5.1 mmol/L    Chloride 101 95 - 110 mmol/L    CO2 27.0 22.0 - 31.0 mmol/L    Calcium 9.9 8.4 - 10.2 mg/dL    Total Protein 8.0 6.3 - 8.6 g/dL    Albumin 4.70 3.40 - 4.80 g/dL    ALT (SGPT) 18 9 - 52 U/L    AST (SGOT) 27 14 - 36 U/L    Alkaline Phosphatase 100 38 - 126 U/L    Total Bilirubin 0.9 0.2 - 1.3 mg/dL    eGFR Non  Amer 98 45 - 104 mL/min/1.73    Globulin 3.3 2.3 - 3.5 gm/dL    A/G Ratio 1.4 1.1 - 1.8 g/dL    BUN/Creatinine Ratio 30.6 (H) 7.0 - 25.0    Anion Gap 9.0 5.0 - 15.0 mmol/L   Results for orders placed or performed during the hospital encounter of 09/11/18   Tissue Pathology Exam   Result Value Ref Range    Case Report       Surgical Pathology Report                         Case: [id number]                                  Authorizing Provider:  Dirk Leigh MD           Collected:           09/11/2018 07:46 AM          Ordering Location:     [facility]             Received:            09/11/2018 09:56 AM                                 [facility]                                                              Pathologist:           Ashish Larson MD                                                        Specimen:     Gallbladder, gallbladder                                                                   Final Diagnosis       CHRONIC CHOLECYSTITIS WITH CHOLELITHIASIS AND CHOLESTEROLOSIS.      Gross Description       The specimen consists of a pear-shaped gallbladder measuring 5.9 x 2.6 x 2.0 cm.  Their external surfaces are smooth and on further examination, its wall is of the usual thickness.  The mucosa shows evidence of cholesterolosis and multiple minute stones measure up to 0.2 cm in greatest dimension are obtained from the lumen.  A representative section is embedded as 1A.     Results for orders placed or performed during the hospital encounter of 09/03/18   Urinalysis, Microscopic Only - Urine, Clean Catch   Result Value Ref Range    RBC, UA 3-5 (A) None Seen /HPF    WBC, UA 0-2 None Seen, 0-2, 3-5 /HPF    Bacteria, UA None Seen None Seen /HPF    Squamous Epithelial Cells, UA 6-12 (A) None Seen, 0-2 /HPF    Hyaline Casts, UA 3-6 None Seen /LPF    Methodology Automated Microscopy    Urinalysis With Microscopic If Indicated (No Culture) - Urine, Clean Catch   Result Value Ref Range    Color, UA Yellow Yellow, Straw, Dark Yellow, Cathie    Appearance, UA Cloudy (A) Clear    pH, UA 6.5 5.0 - 9.0    Specific Gravity, UA 1.019 1.003 - 1.030    Glucose, UA Negative Negative    Ketones, UA Negative Negative    Bilirubin, UA Negative Negative    Blood, UA Trace (A) Negative    Protein, UA Negative Negative    Leuk Esterase, UA Negative Negative    Nitrite, UA Negative Negative    Urobilinogen, UA 0.2 E.U./dL 0.2 - 1.0 E.U./dL   CBC Auto Differential   Result Value Ref Range    WBC 9.02 3.20 - 9.80 10*3/mm3    RBC 4.64 3.77 - 5.16 10*6/mm3    Hemoglobin 13.9 12.0 - 15.5 g/dL    Hematocrit 40.1 35.0 - 45.0 %    MCV 86.4 80.0 - 98.0 fL    MCH 30.0 26.5 - 34.0 pg    MCHC 34.7 31.4 - 36.0 g/dL    RDW 13.7 11.5 - 14.5 %    RDW-SD 43.1 36.4 - 46.3 fl    MPV 10.3 8.0 - 12.0 fL    Platelets 220 150 - 450 10*3/mm3    Neutrophil % 70.8  37.0 - 80.0 %    Lymphocyte % 18.8 10.0 - 50.0 %    Monocyte % 7.8 0.0 - 12.0 %    Eosinophil % 2.1 0.0 - 7.0 %    Basophil % 0.2 0.0 - 2.0 %    Immature Grans % 0.3 0.0 - 0.5 %    Neutrophils, Absolute 6.38 2.00 - 8.60 10*3/mm3    Lymphocytes, Absolute 1.70 0.60 - 4.20 10*3/mm3    Monocytes, Absolute 0.70 0.00 - 0.90 10*3/mm3    Eosinophils, Absolute 0.19 0.00 - 0.70 10*3/mm3    Basophils, Absolute 0.02 0.00 - 0.20 10*3/mm3    Immature Grans, Absolute 0.03 (H) 0.00 - 0.02 10*3/mm3   Lipase   Result Value Ref Range    Lipase 147 23 - 300 U/L   Amylase   Result Value Ref Range    Amylase 72 50 - 130 U/L   Comprehensive Metabolic Panel   Result Value Ref Range    Glucose 142 (H) 60 - 100 mg/dL    BUN 20 7 - 21 mg/dL    Creatinine 0.85 0.50 - 1.00 mg/dL    Sodium 141 137 - 145 mmol/L    Potassium 3.8 3.5 - 5.1 mmol/L    Chloride 103 95 - 110 mmol/L    CO2 28.0 22.0 - 31.0 mmol/L    Calcium 9.8 8.4 - 10.2 mg/dL    Total Protein 7.2 6.3 - 8.6 g/dL    Albumin 4.00 3.40 - 4.80 g/dL    ALT (SGPT) 53 (H) 9 - 52 U/L    AST (SGOT) 84 (H) 14 - 36 U/L    Alkaline Phosphatase 115 38 - 126 U/L    Total Bilirubin 0.7 0.2 - 1.3 mg/dL    eGFR Non  Amer 68 45 - 104 mL/min/1.73    Globulin 3.2 2.3 - 3.5 gm/dL    A/G Ratio 1.3 1.1 - 1.8 g/dL    BUN/Creatinine Ratio 23.5 7.0 - 25.0    Anion Gap 10.0 5.0 - 15.0 mmol/L     *Note: Due to a large number of results and/or encounters for the requested time period, some results have not been displayed. A complete set of results can be found in Results Review.

## 2019-03-11 ENCOUNTER — HOSPITAL ENCOUNTER (OUTPATIENT)
Dept: ULTRASOUND IMAGING | Facility: HOSPITAL | Age: 64
Discharge: HOME | End: 2019-03-11
Admitting: NURSE PRACTITIONER

## 2019-03-11 DIAGNOSIS — R10.10 UPPER ABDOMINAL PAIN: ICD-10-CM

## 2019-03-11 PROCEDURE — 76700 US EXAM ABDOM COMPLETE: CPT

## 2019-03-12 ENCOUNTER — TELEPHONE (OUTPATIENT)
Dept: GASTROENTEROLOGY | Facility: CLINIC | Age: 64
End: 2019-03-12

## 2019-03-12 NOTE — TELEPHONE ENCOUNTER
----- Message from DAVID Mazariegos sent at 3/12/2019  9:30 AM CDT -----  Please call patient with normal results

## 2019-07-03 ENCOUNTER — OFFICE VISIT (OUTPATIENT)
Dept: GASTROENTEROLOGY | Facility: CLINIC | Age: 64
End: 2019-07-03

## 2019-07-03 VITALS
SYSTOLIC BLOOD PRESSURE: 130 MMHG | BODY MASS INDEX: 28.54 KG/M2 | HEIGHT: 66 IN | HEART RATE: 68 BPM | WEIGHT: 177.6 LBS | DIASTOLIC BLOOD PRESSURE: 68 MMHG

## 2019-07-03 DIAGNOSIS — R13.10 DYSPHAGIA, UNSPECIFIED TYPE: Primary | ICD-10-CM

## 2019-07-03 DIAGNOSIS — K21.00 GASTROESOPHAGEAL REFLUX DISEASE WITH ESOPHAGITIS: ICD-10-CM

## 2019-07-03 PROCEDURE — 99214 OFFICE O/P EST MOD 30 MIN: CPT | Performed by: NURSE PRACTITIONER

## 2019-07-03 RX ORDER — DEXTROSE AND SODIUM CHLORIDE 5; .45 G/100ML; G/100ML
30 INJECTION, SOLUTION INTRAVENOUS CONTINUOUS PRN
Status: CANCELLED | OUTPATIENT
Start: 2019-08-20

## 2019-07-03 NOTE — PROGRESS NOTES
Chief Complaint   Patient presents with   • Difficulty Swallowing       Subjective    Humera Swartz is a 64 y.o. female. she is here today for follow-up.    64-year-old female presents to discuss dysphagia.  She was last seen and underwent EGD and colonoscopy in 2016 with repeat recommended in 5 years for surveillance.  History of breast cancer followed by oncology annually.  Recently had her annual mammogram completed in [place].  She was treated with chemotherapy and radiation in 2008.    Difficulty Swallowing   This is a new problem. The current episode started more than 1 month ago. The problem occurs intermittently. The problem has been waxing and waning. Associated symptoms include abdominal pain and fatigue. Pertinent negatives include no anorexia, arthralgias, change in bowel habit, chest pain, chills, congestion, coughing, diaphoresis, fever, headaches, joint swelling, myalgias, nausea, neck pain, numbness, rash, sore throat, swollen glands, urinary symptoms, vertigo, visual change, vomiting or weakness. The symptoms are aggravated by eating and swallowing (solids ). She has tried nothing for the symptoms.     Plan; continue dexilant daily. Follow standard antireflux measures and continue with avoidance of gastric irritants.  Schedule patient for EGD with possible dilation due to possible stricture formation.  Follow-up after test return office sooner if needed       The following portions of the patient's history were reviewed and updated as appropriate:   Past Medical History:   Diagnosis Date   • Acquired equinus deformity of foot     ANKLE   • Anxiety    • Breast cancer (CMS/HCC)    • Cancer (CMS/HCC)     BREAST   • Depression    • Diverticular disease of colon    • Drug therapy    • Esophagitis    • Fibrocystic breast    • Gall stone    • GERD (gastroesophageal reflux disease)    • History of bone density study 01/01/2012    NORMAL   • History of echocardiogram 07/11/2016    Normal LV systolic  function.Ef of 55-60%.Grade 1 diastolic dysfunciton of the LV myocardium.Mild left atiral enlargement.No evidence of pericardial effusion   • History of mammogram 2011    one breast (Benign finding.)   • History of Papanicolaou smear of cervix 2011    NEGATIVE   • Hx of radiation therapy    • Hyperlipidemia    • Injury of head and neck    • Minor head injury    • Personal history of malignant neoplasm of breast    • Plantar fasciitis    • Primary fibromyalgia syndrome    • Solitary pulmonary nodule present on computed tomography of lung      Past Surgical History:   Procedure Laterality Date   • BREAST SURGERY      Carcinoma of the right breast;Mastectomy   •  SECTION     • CHOLECYSTECTOMY WITH INTRAOPERATIVE CHOLANGIOGRAM N/A 2018    Procedure: LAPAROSCOPIC POSSIBLE OPEN CHOLECYSTECTOMY WITH INTRAOPERATIVE CHOLANGIOGRAM    (C-Arm # 1);  Surgeon: Dirk Leigh MD;  Location: [facility];  Service: General   • COLONOSCOPY  2016    Normal colon.No specimens collected   • DIAGNOSTIC LAPAROSCOPY  1977    (Amenorrhea, probable polycystic ovaries. Polycystic ovaries   • ESOPHAGOSCOPY / EGD  2016    Mildly severe esophagitis.Gastritis.Normal examined duodenum.Medium sized hiatus hernia   • EXCISION BREAST LESION W/ PREOP NEEDLE LOC  1987    Bilateral excision of breast masses. Bilateral breast masses; probable fibrocystic disease.   • HYSTEROSCOPY  2011    Exam under anesthesia, diagnostic hysterectomy with fractional dilation and curettage. Thickened endometrial stripe, on Tamoxifen therapy.   • MASTECTOMY Right    • OTHER SURGICAL HISTORY  2016    NEEDLE BIOPSY LYMPH NODES; Ultrasound directed core needle biopsy of the left axilla.   • TUBAL ABDOMINAL LIGATION       Family History   Problem Relation Age of Onset   • Diabetes Other    • Arthritis Other    • Breast cancer Maternal Aunt      OB History      Para Term  AB Living    3 2 2   1 2    SAB  TAB Ectopic Molar Multiple Live Births    1                  Prior to Admission medications    Medication Sig Start Date End Date Taking? Authorizing Provider   B Complex-C-E-Zn (B COMPLEX-C-E-ZINC) tablet Take 1 tablet by mouth Daily.   Yes Daniele Russo MD   busPIRone (BUSPAR) 5 MG tablet Take 2.5 mg by mouth Every Night.   Yes Daniele Russo MD   CBD (cannabidiol) oral oil Take  by mouth Daily.   Yes Daniele Russo MD   Cholecalciferol (VITAMIN D-3 PO) Take 1 tablet by mouth Daily.   Yes Daniele Russo MD   dexlansoprazole (DEXILANT) 60 MG capsule Take 1 capsule by mouth Daily. 2/27/19  Yes Dorita Spann APRN   fluticasone (FLONASE) 50 MCG/ACT nasal spray 2 sprays into the nostril(s) as directed by provider Daily As Needed for Rhinitis.   Yes Daniele Russo MD   nitrofurantoin (MACRODANTIN) 100 MG capsule Take 100 mg by mouth 4 (Four) Times a Day.   Yes Daniele Russo MD   Probiotic Product (PROBIOTIC DAILY PO) Take 1 capsule by mouth Daily.   Yes Daniele Russo MD   sertraline (ZOLOFT) 50 MG tablet Take 50 mg by mouth Every Night.   Yes Daniele Russo MD   vitamin A 10813 UNIT capsule Take 10,000 Units by mouth Daily.  7/3/19 Yes Daniele Russo MD   sertraline (ZOLOFT) 100 MG tablet Take 100 mg by mouth Daily. 2/2/19 7/3/19  Daniele Russo MD   VITAMIN K, PHYTONADIONE, PO Take  by mouth Daily.  7/3/19  Daniele Russo MD     Allergies   Allergen Reactions   • Erythromycin Other (See Comments)     SEVERE WEAKNESS   • Phenergan [Promethazine Hcl] Other (See Comments)     weakness   • Propofol Other (See Comments)     COUGH/WHEEZE  Runny nose   • Tetracyclines & Related GI Intolerance     Social History     Socioeconomic History   • Marital status: Single     Spouse name: Not on file   • Number of children: Not on file   • Years of education: Not on file   • Highest education level: Not on file   Tobacco Use   • Smoking status: Former  "Smoker   • Smokeless tobacco: Never Used   • Tobacco comment: Ceased Smoking 12 Years Prior   Substance and Sexual Activity   • Alcohol use: No   • Drug use: No   • Sexual activity: Defer       Review of Systems  Review of Systems   Constitutional: Positive for fatigue. Negative for activity change, appetite change, chills, diaphoresis, fever and unexpected weight change.   HENT: Negative for congestion and sore throat.    Respiratory: Negative for cough.    Cardiovascular: Negative for chest pain.   Gastrointestinal: Positive for abdominal pain. Negative for abdominal distention, anal bleeding, anorexia, blood in stool, change in bowel habit, constipation, diarrhea, nausea, rectal pain and vomiting.   Musculoskeletal: Positive for back pain. Negative for arthralgias, joint swelling, myalgias and neck pain.   Skin: Negative for rash.   Neurological: Negative for vertigo, weakness, numbness and headaches.        /68 (BP Location: Left arm)   Pulse 68   Ht 167.6 cm (66\")   Wt 80.6 kg (177 lb 9.6 oz)   BMI 28.67 kg/m²     Objective    Physical Exam   Constitutional: She is oriented to person, place, and time. She appears well-developed and well-nourished. She is cooperative. No distress.   HENT:   Head: Normocephalic and atraumatic.   Neck: Normal range of motion. Neck supple. No thyromegaly present.   Cardiovascular: Normal rate, regular rhythm and normal heart sounds.   Pulmonary/Chest: Effort normal and breath sounds normal. She has no wheezes. She has no rhonchi. She has no rales.   Abdominal: Soft. Normal appearance and bowel sounds are normal. She exhibits no distension. There is no hepatosplenomegaly. There is tenderness (mild ) in the right upper quadrant, epigastric area and left upper quadrant. There is no rigidity and no guarding. No hernia.   Lymphadenopathy:     She has no cervical adenopathy.   Neurological: She is alert and oriented to person, place, and time.   Skin: Skin is warm, dry and " intact. No rash noted. No pallor.   Psychiatric: She has a normal mood and affect. Her speech is normal.     Lab on 02/19/2019   Component Date Value Ref Range Status   • Glucose 02/19/2019 94  60 - 100 mg/dL Final   • BUN 02/19/2019 19  7 - 21 mg/dL Final   • Creatinine 02/19/2019 0.62  0.50 - 1.00 mg/dL Final   • Sodium 02/19/2019 137  137 - 145 mmol/L Final   • Potassium 02/19/2019 4.2  3.5 - 5.1 mmol/L Final   • Chloride 02/19/2019 101  95 - 110 mmol/L Final   • CO2 02/19/2019 27.0  22.0 - 31.0 mmol/L Final   • Calcium 02/19/2019 9.9  8.4 - 10.2 mg/dL Final   • Total Protein 02/19/2019 8.0  6.3 - 8.6 g/dL Final   • Albumin 02/19/2019 4.70  3.40 - 4.80 g/dL Final   • ALT (SGPT) 02/19/2019 18  9 - 52 U/L Final   • AST (SGOT) 02/19/2019 27  14 - 36 U/L Final   • Alkaline Phosphatase 02/19/2019 100  38 - 126 U/L Final   • Total Bilirubin 02/19/2019 0.9  0.2 - 1.3 mg/dL Final   • eGFR Non African Amer 02/19/2019 98  45 - 104 mL/min/1.73 Final   • Globulin 02/19/2019 3.3  2.3 - 3.5 gm/dL Final   • A/G Ratio 02/19/2019 1.4  1.1 - 1.8 g/dL Final   • BUN/Creatinine Ratio 02/19/2019 30.6* 7.0 - 25.0 Final   • Anion Gap 02/19/2019 9.0  5.0 - 15.0 mmol/L Final   • WBC 02/19/2019 6.00  3.40 - 10.80 10*3/mm3 Final   • RBC 02/19/2019 4.85  3.77 - 5.28 10*6/mm3 Final   • Hemoglobin 02/19/2019 14.4  12.0 - 15.9 g/dL Final   • Hematocrit 02/19/2019 42.1  34.0 - 46.6 % Final   • MCV 02/19/2019 86.8  79.0 - 97.0 fL Final   • MCH 02/19/2019 29.7  26.6 - 33.0 pg Final   • MCHC 02/19/2019 34.2  31.5 - 35.7 g/dL Final   • RDW 02/19/2019 13.2  12.3 - 15.4 % Final   • RDW-SD 02/19/2019 41.1  37.0 - 54.0 fl Final   • MPV 02/19/2019 10.0  6.0 - 12.0 fL Final   • Platelets 02/19/2019 237  140 - 450 10*3/mm3 Final   • Neutrophil % 02/19/2019 62.0  42.7 - 76.0 % Final   • Lymphocyte % 02/19/2019 27.8  19.6 - 45.3 % Final   • Monocyte % 02/19/2019 7.7  5.0 - 12.0 % Final   • Eosinophil % 02/19/2019 1.5  0.3 - 6.2 % Final   • Basophil %  02/19/2019 0.5  0.0 - 1.5 % Final   • Immature Grans % 02/19/2019 0.5  0.0 - 0.5 % Final   • Neutrophils, Absolute 02/19/2019 3.72  1.40 - 7.00 10*3/mm3 Final   • Lymphocytes, Absolute 02/19/2019 1.67  0.70 - 3.10 10*3/mm3 Final   • Monocytes, Absolute 02/19/2019 0.46  0.10 - 0.90 10*3/mm3 Final   • Eosinophils, Absolute 02/19/2019 0.09  0.00 - 0.40 10*3/mm3 Final   • Basophils, Absolute 02/19/2019 0.03  0.00 - 0.20 10*3/mm3 Final   • Immature Grans, Absolute 02/19/2019 0.03  0.00 - 0.05 10*3/mm3 Final   • nRBC 02/19/2019 0.0  0.0 - 0.0 /100 WBC Final     Assessment/Plan      1. Dysphagia, unspecified type    2. Gastroesophageal reflux disease with esophagitis    .       Orders placed during this encounter include:  Orders Placed This Encounter   Procedures   • Follow Anesthesia Guidelines / Standing Orders     Standing Status:   Future   • Obtain Informed Consent     Standing Status:   Future     Order Specific Question:   Informed Consent Given For     Answer:   ESOPHAGOGASTRODUODENOSCOPY possible dilation       ESOPHAGOGASTRODUODENOSCOPY possible dilation (N/A)    Review and/or summary of lab tests, radiology, procedures, medications. Review and summary of old records and obtaining of history. The risks and benefits of my recommendations, as well as other treatment options were discussed with the patient today. Questions were answered.    No orders of the defined types were placed in this encounter.      Follow-up: Return in about 4 weeks (around 7/31/2019).          This document has been electronically signed by DAVID Ho on July 3, 2019 2:45 PM             Results for orders placed or performed in visit on 02/19/19   CBC Auto Differential   Result Value Ref Range    WBC 6.00 3.40 - 10.80 10*3/mm3    RBC 4.85 3.77 - 5.28 10*6/mm3    Hemoglobin 14.4 12.0 - 15.9 g/dL    Hematocrit 42.1 34.0 - 46.6 %    MCV 86.8 79.0 - 97.0 fL    MCH 29.7 26.6 - 33.0 pg    MCHC 34.2 31.5 - 35.7 g/dL    RDW 13.2 12.3 -  15.4 %    RDW-SD 41.1 37.0 - 54.0 fl    MPV 10.0 6.0 - 12.0 fL    Platelets 237 140 - 450 10*3/mm3    Neutrophil % 62.0 42.7 - 76.0 %    Lymphocyte % 27.8 19.6 - 45.3 %    Monocyte % 7.7 5.0 - 12.0 %    Eosinophil % 1.5 0.3 - 6.2 %    Basophil % 0.5 0.0 - 1.5 %    Immature Grans % 0.5 0.0 - 0.5 %    Neutrophils, Absolute 3.72 1.40 - 7.00 10*3/mm3    Lymphocytes, Absolute 1.67 0.70 - 3.10 10*3/mm3    Monocytes, Absolute 0.46 0.10 - 0.90 10*3/mm3    Eosinophils, Absolute 0.09 0.00 - 0.40 10*3/mm3    Basophils, Absolute 0.03 0.00 - 0.20 10*3/mm3    Immature Grans, Absolute 0.03 0.00 - 0.05 10*3/mm3    nRBC 0.0 0.0 - 0.0 /100 WBC   Comprehensive Metabolic Panel   Result Value Ref Range    Glucose 94 60 - 100 mg/dL    BUN 19 7 - 21 mg/dL    Creatinine 0.62 0.50 - 1.00 mg/dL    Sodium 137 137 - 145 mmol/L    Potassium 4.2 3.5 - 5.1 mmol/L    Chloride 101 95 - 110 mmol/L    CO2 27.0 22.0 - 31.0 mmol/L    Calcium 9.9 8.4 - 10.2 mg/dL    Total Protein 8.0 6.3 - 8.6 g/dL    Albumin 4.70 3.40 - 4.80 g/dL    ALT (SGPT) 18 9 - 52 U/L    AST (SGOT) 27 14 - 36 U/L    Alkaline Phosphatase 100 38 - 126 U/L    Total Bilirubin 0.9 0.2 - 1.3 mg/dL    eGFR Non  Amer 98 45 - 104 mL/min/1.73    Globulin 3.3 2.3 - 3.5 gm/dL    A/G Ratio 1.4 1.1 - 1.8 g/dL    BUN/Creatinine Ratio 30.6 (H) 7.0 - 25.0    Anion Gap 9.0 5.0 - 15.0 mmol/L   Results for orders placed or performed during the hospital encounter of 09/11/18   Tissue Pathology Exam   Result Value Ref Range    Case Report       Surgical Pathology Report                         Case: [id number]                                  Authorizing Provider:  Dirk Leigh MD           Collected:           09/11/2018 07:46 AM          Ordering Location:     [facility]             Received:            09/11/2018 09:56 AM                                 [place]                                                              Pathologist:           Ashish Larson MD                                                         Specimen:    Gallbladder, gallbladder                                                                   Final Diagnosis       CHRONIC CHOLECYSTITIS WITH CHOLELITHIASIS AND CHOLESTEROLOSIS.      Gross Description       The specimen consists of a pear-shaped gallbladder measuring 5.9 x 2.6 x 2.0 cm.  Their external surfaces are smooth and on further examination, its wall is of the usual thickness.  The mucosa shows evidence of cholesterolosis and multiple minute stones measure up to 0.2 cm in greatest dimension are obtained from the lumen.  A representative section is embedded as 1A.     Results for orders placed or performed during the hospital encounter of 09/03/18   Urinalysis, Microscopic Only - Urine, Clean Catch   Result Value Ref Range    RBC, UA 3-5 (A) None Seen /HPF    WBC, UA 0-2 None Seen, 0-2, 3-5 /HPF    Bacteria, UA None Seen None Seen /HPF    Squamous Epithelial Cells, UA 6-12 (A) None Seen, 0-2 /HPF    Hyaline Casts, UA 3-6 None Seen /LPF    Methodology Automated Microscopy    Urinalysis With Microscopic If Indicated (No Culture) - Urine, Clean Catch   Result Value Ref Range    Color, UA Yellow Yellow, Straw, Dark Yellow, Cathie    Appearance, UA Cloudy (A) Clear    pH, UA 6.5 5.0 - 9.0    Specific Gravity, UA 1.019 1.003 - 1.030    Glucose, UA Negative Negative    Ketones, UA Negative Negative    Bilirubin, UA Negative Negative    Blood, UA Trace (A) Negative    Protein, UA Negative Negative    Leuk Esterase, UA Negative Negative    Nitrite, UA Negative Negative    Urobilinogen, UA 0.2 E.U./dL 0.2 - 1.0 E.U./dL   CBC Auto Differential   Result Value Ref Range    WBC 9.02 3.20 - 9.80 10*3/mm3    RBC 4.64 3.77 - 5.16 10*6/mm3    Hemoglobin 13.9 12.0 - 15.5 g/dL    Hematocrit 40.1 35.0 - 45.0 %    MCV 86.4 80.0 - 98.0 fL    MCH 30.0 26.5 - 34.0 pg    MCHC 34.7 31.4 - 36.0 g/dL    RDW 13.7 11.5 - 14.5 %    RDW-SD 43.1 36.4 - 46.3 fl    MPV 10.3 8.0 - 12.0 fL     Platelets 220 150 - 450 10*3/mm3    Neutrophil % 70.8 37.0 - 80.0 %    Lymphocyte % 18.8 10.0 - 50.0 %    Monocyte % 7.8 0.0 - 12.0 %    Eosinophil % 2.1 0.0 - 7.0 %    Basophil % 0.2 0.0 - 2.0 %    Immature Grans % 0.3 0.0 - 0.5 %    Neutrophils, Absolute 6.38 2.00 - 8.60 10*3/mm3    Lymphocytes, Absolute 1.70 0.60 - 4.20 10*3/mm3    Monocytes, Absolute 0.70 0.00 - 0.90 10*3/mm3    Eosinophils, Absolute 0.19 0.00 - 0.70 10*3/mm3    Basophils, Absolute 0.02 0.00 - 0.20 10*3/mm3    Immature Grans, Absolute 0.03 (H) 0.00 - 0.02 10*3/mm3   Lipase   Result Value Ref Range    Lipase 147 23 - 300 U/L   Amylase   Result Value Ref Range    Amylase 72 50 - 130 U/L   Comprehensive Metabolic Panel   Result Value Ref Range    Glucose 142 (H) 60 - 100 mg/dL    BUN 20 7 - 21 mg/dL    Creatinine 0.85 0.50 - 1.00 mg/dL    Sodium 141 137 - 145 mmol/L    Potassium 3.8 3.5 - 5.1 mmol/L    Chloride 103 95 - 110 mmol/L    CO2 28.0 22.0 - 31.0 mmol/L    Calcium 9.8 8.4 - 10.2 mg/dL    Total Protein 7.2 6.3 - 8.6 g/dL    Albumin 4.00 3.40 - 4.80 g/dL    ALT (SGPT) 53 (H) 9 - 52 U/L    AST (SGOT) 84 (H) 14 - 36 U/L    Alkaline Phosphatase 115 38 - 126 U/L    Total Bilirubin 0.7 0.2 - 1.3 mg/dL    eGFR Non  Amer 68 45 - 104 mL/min/1.73    Globulin 3.2 2.3 - 3.5 gm/dL    A/G Ratio 1.3 1.1 - 1.8 g/dL    BUN/Creatinine Ratio 23.5 7.0 - 25.0    Anion Gap 10.0 5.0 - 15.0 mmol/L     *Note: Due to a large number of results and/or encounters for the requested time period, some results have not been displayed. A complete set of results can be found in Results Review.

## 2019-07-03 NOTE — PATIENT INSTRUCTIONS
Dysphagia  Dysphagia is trouble swallowing. This condition occurs when solids and liquids stick in a person's throat on the way down to the stomach, or when food takes longer to get to the stomach. You may have problems swallowing food, liquids, or both. You may also have pain while trying to swallow. It may take you more time and effort to swallow something.  What are the causes?  This condition is caused by:  · Problems with the muscles. They may make it difficult for you to move food and liquids through the tube that connects your mouth to your stomach (esophagus). You may have ulcers, scar tissue, or inflammation that blocks the normal passage of food and liquids. Causes of these problems include:  ? Acid reflux from your stomach into your esophagus (gastroesophageal reflux).  ? Infections.  ? Radiation treatment for cancer.  ? Medicines taken without enough fluids to wash them down into your stomach.  · Nerve problems. These prevent signals from being sent to the muscles of your esophagus to squeeze (contract) and move what you swallow down to your stomach.  · Globus pharyngeus. This is a common problem that involves feeling like something is stuck in the throat or a sense of trouble with swallowing even though nothing is wrong with the swallowing passages.  · Stroke. This can affect the nerves and make it difficult to swallow.  · Certain conditions, such as cerebral palsy or Parkinson disease.    What are the signs or symptoms?  Common symptoms of this condition include:  · A feeling that solids or liquids are stuck in your throat on the way down to the stomach.  · Food taking too long to get to the stomach.    Other symptoms include:  · Food moving back from your stomach to your mouth (regurgitation).  · Noises coming from your throat.  · Chest discomfort with swallowing.  · A feeling of fullness when swallowing.  · Drooling, especially when the throat is blocked.  · Pain while  swallowing.  · Heartburn.  · Coughing or gagging while trying to swallow.    How is this diagnosed?  This condition is diagnosed by:  · Barium X-ray. In this test, you swallow a white substance (contrast medium)that sticks to the inside of your esophagus. X-ray images are then taken.  · Endoscopy. In this test, a flexible telescope is inserted down your throat to look at your esophagus and your stomach.  · CT scans and MRI.    How is this treated?  Treatment for dysphagia depends on the cause of the condition:  · If the dysphagia is caused by acid reflux or infection, medicines may be used. They may include antibiotics and heartburn medicines.  · If the dysphagia is caused by problems with your muscles, swallowing therapy may be used to help you strengthen your swallowing muscles. You may have to do specific exercises to strengthen the muscles or stretch them.  · If the dysphagia is caused by a blockage or mass, procedures to remove the blockage may be done. You may need surgery and a feeding tube.    You may need to make diet changes. Ask your health care provider for specific instructions.  Follow these instructions at home:  Eating and drinking  · Try to eat soft food that is easier to swallow.  · Follow any diet changes as told by your health care provider.  · Cut your food into small pieces and eat slowly.  · Eat and drink only when you are sitting upright.  · Do not drink alcohol or caffeine. If you need help quitting, ask your health care provider.  General instructions  · Check your weight every day to make sure you are not losing weight.  · Take over-the-counter and prescription medicines only as told by your health care provider.  · If you were prescribed an antibiotic medicine, take it as told by your health care provider. Do not stop taking the antibiotic even if you start to feel better.  · Do not use any products that contain nicotine or tobacco, such as cigarettes and e-cigarettes. If you need help  quitting, ask your health care provider.  · Keep all follow-up visits as told by your health care provider. This is important.  Contact a health care provider if:  · You lose weight because you cannot swallow.  · You cough when you drink liquids (aspiration).  · You cough up partially digested food.  Get help right away if:  · You cannot swallow your saliva.  · You have shortness of breath or a fever, or both.  · You have a hoarse voice and also have trouble swallowing.  Summary  · Dysphagia is trouble swallowing. This condition occurs when solids and liquids stick in a person's throat on the way down to the stomach, or when food takes longer to get to the stomach.  · Dysphagia has many possible causes and symptoms.  · Treatment for dysphagia depends on the cause of the condition.  This information is not intended to replace advice given to you by your health care provider. Make sure you discuss any questions you have with your health care provider.  Document Released: 12/15/2001 Document Revised: 12/07/2017 Document Reviewed: 12/07/2017  [employer] Interactive Patient Education © 2019 [employer]

## 2019-08-20 ENCOUNTER — HOSPITAL ENCOUNTER (OUTPATIENT)
Facility: HOSPITAL | Age: 64
Setting detail: HOSPITAL OUTPATIENT SURGERY
Discharge: HOME | End: 2019-08-20
Attending: INTERNAL MEDICINE | Admitting: INTERNAL MEDICINE

## 2019-08-20 ENCOUNTER — ANESTHESIA EVENT (OUTPATIENT)
Dept: GASTROENTEROLOGY | Facility: HOSPITAL | Age: 64
End: 2019-08-20

## 2019-08-20 ENCOUNTER — ANESTHESIA (OUTPATIENT)
Dept: GASTROENTEROLOGY | Facility: HOSPITAL | Age: 64
End: 2019-08-20

## 2019-08-20 VITALS
BODY MASS INDEX: 25.71 KG/M2 | TEMPERATURE: 97.4 F | WEIGHT: 160 LBS | DIASTOLIC BLOOD PRESSURE: 58 MMHG | OXYGEN SATURATION: 95 % | HEART RATE: 60 BPM | HEIGHT: 66 IN | SYSTOLIC BLOOD PRESSURE: 127 MMHG | RESPIRATION RATE: 16 BRPM

## 2019-08-20 DIAGNOSIS — R13.10 DYSPHAGIA, UNSPECIFIED TYPE: ICD-10-CM

## 2019-08-20 DIAGNOSIS — K21.00 GASTROESOPHAGEAL REFLUX DISEASE WITH ESOPHAGITIS: ICD-10-CM

## 2019-08-20 PROCEDURE — 43248 EGD GUIDE WIRE INSERTION: CPT | Performed by: INTERNAL MEDICINE

## 2019-08-20 PROCEDURE — 25010000002 PROPOFOL 10 MG/ML EMULSION: Performed by: NURSE ANESTHETIST, CERTIFIED REGISTERED

## 2019-08-20 RX ORDER — LIDOCAINE HYDROCHLORIDE 20 MG/ML
INJECTION, SOLUTION EPIDURAL; INFILTRATION; INTRACAUDAL; PERINEURAL AS NEEDED
Status: DISCONTINUED | OUTPATIENT
Start: 2019-08-20 | End: 2019-08-20 | Stop reason: SURG

## 2019-08-20 RX ORDER — PROPOFOL 10 MG/ML
VIAL (ML) INTRAVENOUS AS NEEDED
Status: DISCONTINUED | OUTPATIENT
Start: 2019-08-20 | End: 2019-08-20 | Stop reason: SURG

## 2019-08-20 RX ORDER — DEXTROSE AND SODIUM CHLORIDE 5; .45 G/100ML; G/100ML
30 INJECTION, SOLUTION INTRAVENOUS CONTINUOUS PRN
Status: DISCONTINUED | OUTPATIENT
Start: 2019-08-20 | End: 2019-08-20 | Stop reason: HOSPADM

## 2019-08-20 RX ADMIN — LIDOCAINE HYDROCHLORIDE 70 MG: 20 INJECTION, SOLUTION EPIDURAL; INFILTRATION; INTRACAUDAL at 14:28

## 2019-08-20 RX ADMIN — DEXTROSE AND SODIUM CHLORIDE 30 ML/HR: 5; 450 INJECTION, SOLUTION INTRAVENOUS at 14:12

## 2019-08-20 RX ADMIN — PROPOFOL 100 MG: 10 INJECTION, EMULSION INTRAVENOUS at 14:28

## 2019-08-20 RX ADMIN — PROPOFOL 20 MG: 10 INJECTION, EMULSION INTRAVENOUS at 14:37

## 2019-08-20 RX ADMIN — PROPOFOL 50 MG: 10 INJECTION, EMULSION INTRAVENOUS at 14:31

## 2019-08-20 RX ADMIN — PROPOFOL 50 MG: 10 INJECTION, EMULSION INTRAVENOUS at 14:34

## 2019-08-20 NOTE — ANESTHESIA POSTPROCEDURE EVALUATION
Patient: Humera Swartz    Procedure Summary     Date:  08/20/19 Room / Location:  [facility] ENDOSCOPY 2 / [facility] ENDOSCOPY    Anesthesia Start:  1425 Anesthesia Stop:  1450    Procedure:  ESOPHAGOGASTRODUODENOSCOPY possible dilation (N/A ) Diagnosis:       Dysphagia, unspecified type      Gastroesophageal reflux disease with esophagitis      (Dysphagia, unspecified type [R13.10])      (Gastroesophageal reflux disease with esophagitis [K21.0])    Surgeon:  Brooks Reinoso MD Provider:  Darren Howard CRNA    Anesthesia Type:  MAC ASA Status:  3          Anesthesia Type: MAC  Last vitals  BP       Temp       Pulse       Resp         SpO2         Post Anesthesia Care and Evaluation    Patient location during evaluation: bedside  Patient participation: complete - patient participated  Level of consciousness: awake  Pain score: 0  Pain management: adequate  Airway patency: patent  Anesthetic complications: No anesthetic complications  PONV Status: none  Cardiovascular status: acceptable  Respiratory status: acceptable  Hydration status: acceptable

## 2019-08-20 NOTE — ANESTHESIA PREPROCEDURE EVALUATION
Anesthesia Evaluation     Patient summary reviewed and Nursing notes reviewed   history of anesthetic complications (Scopalamine patch applied pre-op. ): PONV  NPO Solid Status: > 8 hours  NPO Liquid Status: > 4 hours           Airway   Mallampati: II  TM distance: >3 FB  Neck ROM: full  possible difficult intubation  Dental - normal exam     Pulmonary - normal exam    breath sounds clear to auscultation  (+) a smoker Former,     ROS comment: COMPARISON: 10/29/2016     FINDINGS: PA lateral chest. The bony structures are intact. The  cardiomediastinal silhouette is unremarkable. Lungs are clear. No  pneumothorax or pleural effusion.     IMPRESSION:  No acute cardiopulmonary abnormality.     Electronically signed by:  Hosea Adkins MD  6/17/2017 3:35 AM  Cardiovascular - normal exam    ECG reviewed  Rhythm: regular  Rate: normal    (+) hyperlipidemia,   (-) murmur    ROS comment: FINAL IMPRESSION:  1.   Normal LV systolic function. Ejection fraction of 55% to 60%.  2.   Grade 1 diastolic dysfunction of the left ventricular myocardium.  3.   Mild left atrial enlargement.  4.   No evidence of pericardial effusion.        VALDO YI M.D.  Electronically Signed  07/16/2016 14:50:39                                                                           Normal sinus rhythm  Normal ECG    Confirmed by ELIZABETH KHAN, QUETA ([id number]),  GWENDOLYN SMITH ([id number]) on  9/3/2018 6:24:34 PM    Neuro/Psych  (+) psychiatric history Anxiety and Depression,     GI/Hepatic/Renal/Endo    (+)  GERD well controlled,      Musculoskeletal     (+) myalgias (Fibromyalgia.),   Abdominal    Substance History - negative use     OB/GYN negative ob/gyn ROS         Other      history of cancer (Right breast cancer treated.) remission                      Anesthesia Plan    ASA 3     MAC     intravenous induction   Anesthetic plan, all risks, benefits, and alternatives have been provided, discussed and informed consent has been obtained with: patient  and spouse/significant other.    Plan discussed with CRNA.

## 2019-08-20 NOTE — H&P
No chief complaint on file.      Subjective    Humera Swartz is a 64 y.o. female. she is here today for follow-up.    64-year-old female presents to discuss dysphagia.  She was last seen and underwent EGD and colonoscopy in 2016 with repeat recommended in 5 years for surveillance.  History of breast cancer followed by oncology annually.  Recently had her annual mammogram completed in [place].  She was treated with chemotherapy and radiation in 2008.    Difficulty Swallowing   This is a new problem. The current episode started more than 1 month ago. The problem occurs intermittently. The problem has been waxing and waning. Associated symptoms include abdominal pain and fatigue. Pertinent negatives include no anorexia, arthralgias, change in bowel habit, chest pain, chills, congestion, coughing, diaphoresis, fever, headaches, joint swelling, myalgias, nausea, neck pain, numbness, rash, sore throat, swollen glands, urinary symptoms, vertigo, visual change, vomiting or weakness. The symptoms are aggravated by eating and swallowing (solids ). She has tried nothing for the symptoms.     Plan; continue dexilant daily. Follow standard antireflux measures and continue with avoidance of gastric irritants.  Schedule patient for EGD with possible dilation due to possible stricture formation.  Follow-up after test return office sooner if needed       The following portions of the patient's history were reviewed and updated as appropriate:   Past Medical History:   Diagnosis Date   • Acquired equinus deformity of foot     ANKLE   • Anxiety    • Breast cancer (CMS/HCC)    • Cancer (CMS/HCC)     BREAST   • Depression    • Diverticular disease of colon    • Drug therapy    • Esophagitis    • Fibrocystic breast    • Gall stone    • GERD (gastroesophageal reflux disease)    • History of bone density study 01/01/2012    NORMAL   • History of echocardiogram 07/11/2016    Normal LV systolic function.Ef of 55-60%.Grade 1 diastolic  dysfunciton of the LV myocardium.Mild left atiral enlargement.No evidence of pericardial effusion   • History of mammogram 2011    one breast (Benign finding.)   • History of Papanicolaou smear of cervix 2011    NEGATIVE   • Hx of radiation therapy    • Hyperlipidemia    • Injury of head and neck    • Minor head injury    • Personal history of malignant neoplasm of breast    • Plantar fasciitis    • Primary fibromyalgia syndrome    • Solitary pulmonary nodule present on computed tomography of lung      Past Surgical History:   Procedure Laterality Date   • BREAST SURGERY      Carcinoma of the right breast;Mastectomy   •  SECTION     • CHOLECYSTECTOMY WITH INTRAOPERATIVE CHOLANGIOGRAM N/A 2018    Procedure: LAPAROSCOPIC POSSIBLE OPEN CHOLECYSTECTOMY WITH INTRAOPERATIVE CHOLANGIOGRAM    (C-Arm # 1);  Surgeon: Dirk Leigh MD;  Location: [facility];  Service: General   • COLONOSCOPY  2016    Normal colon.No specimens collected   • DIAGNOSTIC LAPAROSCOPY  1977    (Amenorrhea, probable polycystic ovaries. Polycystic ovaries   • ESOPHAGOSCOPY / EGD  2016    Mildly severe esophagitis.Gastritis.Normal examined duodenum.Medium sized hiatus hernia   • EXCISION BREAST LESION W/ PREOP NEEDLE LOC  1987    Bilateral excision of breast masses. Bilateral breast masses; probable fibrocystic disease.   • HYSTEROSCOPY  2011    Exam under anesthesia, diagnostic hysterectomy with fractional dilation and curettage. Thickened endometrial stripe, on Tamoxifen therapy.   • MASTECTOMY Right    • OTHER SURGICAL HISTORY  2016    NEEDLE BIOPSY LYMPH NODES; Ultrasound directed core needle biopsy of the left axilla.   • TUBAL ABDOMINAL LIGATION       Family History   Problem Relation Age of Onset   • Diabetes Other    • Arthritis Other    • Breast cancer Maternal Aunt      OB History      Para Term  AB Living    3 2 2   1 2    SAB TAB Ectopic Molar Multiple Live Births     1                  Prior to Admission medications    Medication Sig Start Date End Date Taking? Authorizing Provider   B Complex-C-E-Zn (B COMPLEX-C-E-ZINC) tablet Take 1 tablet by mouth Daily.   Yes Daniele Russo MD   busPIRone (BUSPAR) 5 MG tablet Take 2.5 mg by mouth Every Night.   Yes Daniele Russo MD   CBD (cannabidiol) oral oil Take  by mouth Daily.   Yes Daniele Russo MD   Cholecalciferol (VITAMIN D-3 PO) Take 1 tablet by mouth Daily.   Yes Daniele Russo MD   dexlansoprazole (DEXILANT) 60 MG capsule Take 1 capsule by mouth Daily. 2/27/19  Yes Dorita Spann APRN   fluticasone (FLONASE) 50 MCG/ACT nasal spray 2 sprays into the nostril(s) as directed by provider Daily As Needed for Rhinitis.   Yes Daniele Russo MD   nitrofurantoin (MACRODANTIN) 100 MG capsule Take 100 mg by mouth 4 (Four) Times a Day.   Yes Daniele Russo MD   Probiotic Product (PROBIOTIC DAILY PO) Take 1 capsule by mouth Daily.   Yes Daniele Russo MD   sertraline (ZOLOFT) 50 MG tablet Take 50 mg by mouth Every Night.   Yes Daniele Russo MD   vitamin A 65363 UNIT capsule Take 10,000 Units by mouth Daily.  7/3/19 Yes Daniele Russo MD   sertraline (ZOLOFT) 100 MG tablet Take 100 mg by mouth Daily. 2/2/19 7/3/19  Daniele Russo MD   VITAMIN K, PHYTONADIONE, PO Take  by mouth Daily.  7/3/19  Daniele Russo MD     Allergies   Allergen Reactions   • Erythromycin Other (See Comments)     SEVERE WEAKNESS   • Phenergan [Promethazine Hcl] Other (See Comments)     weakness   • Propofol Other (See Comments)     COUGH/WHEEZE  Runny nose   • Tetracyclines & Related GI Intolerance     Social History     Socioeconomic History   • Marital status: Single     Spouse name: Not on file   • Number of children: Not on file   • Years of education: Not on file   • Highest education level: Not on file   Tobacco Use   • Smoking status: Former Smoker   • Smokeless tobacco: Never Used   •  "Tobacco comment: Ceased Smoking 12 Years Prior   Substance and Sexual Activity   • Alcohol use: No   • Drug use: No   • Sexual activity: Defer       Review of Systems  Review of Systems   Constitutional: Positive for fatigue. Negative for activity change, appetite change, chills, diaphoresis, fever and unexpected weight change.   HENT: Negative for congestion and sore throat.    Respiratory: Negative for cough.    Cardiovascular: Negative for chest pain.   Gastrointestinal: Positive for abdominal pain. Negative for abdominal distention, anal bleeding, anorexia, blood in stool, change in bowel habit, constipation, diarrhea, nausea, rectal pain and vomiting.   Musculoskeletal: Positive for back pain. Negative for arthralgias, joint swelling, myalgias and neck pain.   Skin: Negative for rash.   Neurological: Negative for vertigo, weakness, numbness and headaches.        Ht 167.6 cm (66\")   Wt 72.6 kg (160 lb)   BMI 25.82 kg/m²     Objective    Physical Exam   Constitutional: She is oriented to person, place, and time. She appears well-developed and well-nourished. She is cooperative. No distress.   HENT:   Head: Normocephalic and atraumatic.   Neck: Normal range of motion. Neck supple. No thyromegaly present.   Cardiovascular: Normal rate, regular rhythm and normal heart sounds.   Pulmonary/Chest: Effort normal and breath sounds normal. She has no wheezes. She has no rhonchi. She has no rales.   Abdominal: Soft. Normal appearance and bowel sounds are normal. She exhibits no distension. There is no hepatosplenomegaly. There is tenderness (mild ) in the right upper quadrant, epigastric area and left upper quadrant. There is no rigidity and no guarding. No hernia.   Lymphadenopathy:     She has no cervical adenopathy.   Neurological: She is alert and oriented to person, place, and time.   Skin: Skin is warm, dry and intact. No rash noted. No pallor.   Psychiatric: She has a normal mood and affect. Her speech is normal. "     Lab on 02/19/2019   Component Date Value Ref Range Status   • Glucose 02/19/2019 94  60 - 100 mg/dL Final   • BUN 02/19/2019 19  7 - 21 mg/dL Final   • Creatinine 02/19/2019 0.62  0.50 - 1.00 mg/dL Final   • Sodium 02/19/2019 137  137 - 145 mmol/L Final   • Potassium 02/19/2019 4.2  3.5 - 5.1 mmol/L Final   • Chloride 02/19/2019 101  95 - 110 mmol/L Final   • CO2 02/19/2019 27.0  22.0 - 31.0 mmol/L Final   • Calcium 02/19/2019 9.9  8.4 - 10.2 mg/dL Final   • Total Protein 02/19/2019 8.0  6.3 - 8.6 g/dL Final   • Albumin 02/19/2019 4.70  3.40 - 4.80 g/dL Final   • ALT (SGPT) 02/19/2019 18  9 - 52 U/L Final   • AST (SGOT) 02/19/2019 27  14 - 36 U/L Final   • Alkaline Phosphatase 02/19/2019 100  38 - 126 U/L Final   • Total Bilirubin 02/19/2019 0.9  0.2 - 1.3 mg/dL Final   • eGFR Non African Amer 02/19/2019 98  45 - 104 mL/min/1.73 Final   • Globulin 02/19/2019 3.3  2.3 - 3.5 gm/dL Final   • A/G Ratio 02/19/2019 1.4  1.1 - 1.8 g/dL Final   • BUN/Creatinine Ratio 02/19/2019 30.6* 7.0 - 25.0 Final   • Anion Gap 02/19/2019 9.0  5.0 - 15.0 mmol/L Final   • WBC 02/19/2019 6.00  3.40 - 10.80 10*3/mm3 Final   • RBC 02/19/2019 4.85  3.77 - 5.28 10*6/mm3 Final   • Hemoglobin 02/19/2019 14.4  12.0 - 15.9 g/dL Final   • Hematocrit 02/19/2019 42.1  34.0 - 46.6 % Final   • MCV 02/19/2019 86.8  79.0 - 97.0 fL Final   • MCH 02/19/2019 29.7  26.6 - 33.0 pg Final   • MCHC 02/19/2019 34.2  31.5 - 35.7 g/dL Final   • RDW 02/19/2019 13.2  12.3 - 15.4 % Final   • RDW-SD 02/19/2019 41.1  37.0 - 54.0 fl Final   • MPV 02/19/2019 10.0  6.0 - 12.0 fL Final   • Platelets 02/19/2019 237  140 - 450 10*3/mm3 Final   • Neutrophil % 02/19/2019 62.0  42.7 - 76.0 % Final   • Lymphocyte % 02/19/2019 27.8  19.6 - 45.3 % Final   • Monocyte % 02/19/2019 7.7  5.0 - 12.0 % Final   • Eosinophil % 02/19/2019 1.5  0.3 - 6.2 % Final   • Basophil % 02/19/2019 0.5  0.0 - 1.5 % Final   • Immature Grans % 02/19/2019 0.5  0.0 - 0.5 % Final   • Neutrophils,  Absolute 02/19/2019 3.72  1.40 - 7.00 10*3/mm3 Final   • Lymphocytes, Absolute 02/19/2019 1.67  0.70 - 3.10 10*3/mm3 Final   • Monocytes, Absolute 02/19/2019 0.46  0.10 - 0.90 10*3/mm3 Final   • Eosinophils, Absolute 02/19/2019 0.09  0.00 - 0.40 10*3/mm3 Final   • Basophils, Absolute 02/19/2019 0.03  0.00 - 0.20 10*3/mm3 Final   • Immature Grans, Absolute 02/19/2019 0.03  0.00 - 0.05 10*3/mm3 Final   • nRBC 02/19/2019 0.0  0.0 - 0.0 /100 WBC Final     Assessment/Plan      1. Dysphagia, unspecified type    2. Gastroesophageal reflux disease with esophagitis    .       Orders placed during this encounter include:  Orders Placed This Encounter   Procedures   • Implement Anesthesia Orders Day of Procedure     Standing Status:   Standing     Number of Occurrences:   1   • Obtain Informed Consent     Standing Status:   Standing     Number of Occurrences:   1     Order Specific Question:   Informed Consent Given For     Answer:   ESOPHAGOGASTRODUODENOSCOPY   • POC Glucose Once     Prior to Procedure on ALL Diabetic Patients     Standing Status:   Standing     Number of Occurrences:   1   • Insert Peripheral IV     Standing Status:   Standing     Number of Occurrences:   1       ESOPHAGOGASTRODUODENOSCOPY possible dilation (N/A)    Review and/or summary of lab tests, radiology, procedures, medications. Review and summary of old records and obtaining of history. The risks and benefits of my recommendations, as well as other treatment options were discussed with the patient today. Questions were answered.    New Medications Ordered This Visit   Medications   • dextrose 5 % and sodium chloride 0.45 % infusion       Follow-up: No Follow-up on file.          This document has been electronically signed by Brooks Reinoso MD on August 20, 2019 1:51 PM             Results for orders placed or performed in visit on 02/19/19   CBC Auto Differential   Result Value Ref Range    WBC 6.00 3.40 - 10.80 10*3/mm3    RBC 4.85 3.77 - 5.28  10*6/mm3    Hemoglobin 14.4 12.0 - 15.9 g/dL    Hematocrit 42.1 34.0 - 46.6 %    MCV 86.8 79.0 - 97.0 fL    MCH 29.7 26.6 - 33.0 pg    MCHC 34.2 31.5 - 35.7 g/dL    RDW 13.2 12.3 - 15.4 %    RDW-SD 41.1 37.0 - 54.0 fl    MPV 10.0 6.0 - 12.0 fL    Platelets 237 140 - 450 10*3/mm3    Neutrophil % 62.0 42.7 - 76.0 %    Lymphocyte % 27.8 19.6 - 45.3 %    Monocyte % 7.7 5.0 - 12.0 %    Eosinophil % 1.5 0.3 - 6.2 %    Basophil % 0.5 0.0 - 1.5 %    Immature Grans % 0.5 0.0 - 0.5 %    Neutrophils, Absolute 3.72 1.40 - 7.00 10*3/mm3    Lymphocytes, Absolute 1.67 0.70 - 3.10 10*3/mm3    Monocytes, Absolute 0.46 0.10 - 0.90 10*3/mm3    Eosinophils, Absolute 0.09 0.00 - 0.40 10*3/mm3    Basophils, Absolute 0.03 0.00 - 0.20 10*3/mm3    Immature Grans, Absolute 0.03 0.00 - 0.05 10*3/mm3    nRBC 0.0 0.0 - 0.0 /100 WBC   Comprehensive Metabolic Panel   Result Value Ref Range    Glucose 94 60 - 100 mg/dL    BUN 19 7 - 21 mg/dL    Creatinine 0.62 0.50 - 1.00 mg/dL    Sodium 137 137 - 145 mmol/L    Potassium 4.2 3.5 - 5.1 mmol/L    Chloride 101 95 - 110 mmol/L    CO2 27.0 22.0 - 31.0 mmol/L    Calcium 9.9 8.4 - 10.2 mg/dL    Total Protein 8.0 6.3 - 8.6 g/dL    Albumin 4.70 3.40 - 4.80 g/dL    ALT (SGPT) 18 9 - 52 U/L    AST (SGOT) 27 14 - 36 U/L    Alkaline Phosphatase 100 38 - 126 U/L    Total Bilirubin 0.9 0.2 - 1.3 mg/dL    eGFR Non  Amer 98 45 - 104 mL/min/1.73    Globulin 3.3 2.3 - 3.5 gm/dL    A/G Ratio 1.4 1.1 - 1.8 g/dL    BUN/Creatinine Ratio 30.6 (H) 7.0 - 25.0    Anion Gap 9.0 5.0 - 15.0 mmol/L   Results for orders placed or performed during the hospital encounter of 09/11/18   Tissue Pathology Exam   Result Value Ref Range    Case Report       Surgical Pathology Report                         Case: [id number]                                  Authorizing Provider:  Dirk Leigh MD           Collected:           09/11/2018 07:46 AM          [facility]             Received:             09/11/2018 09:56 AM                                 [place]                                                              Pathologist:           Ashish Larson MD                                                        Specimen:    Gallbladder, gallbladder                                                                   Final Diagnosis       CHRONIC CHOLECYSTITIS WITH CHOLELITHIASIS AND CHOLESTEROLOSIS.      Gross Description       The specimen consists of a pear-shaped gallbladder measuring 5.9 x 2.6 x 2.0 cm.  Their external surfaces are smooth and on further examination, its wall is of the usual thickness.  The mucosa shows evidence of cholesterolosis and multiple minute stones measure up to 0.2 cm in greatest dimension are obtained from the lumen.  A representative section is embedded as 1A.     Results for orders placed or performed during the hospital encounter of 09/03/18   Urinalysis, Microscopic Only - Urine, Clean Catch   Result Value Ref Range    RBC, UA 3-5 (A) None Seen /HPF    WBC, UA 0-2 None Seen, 0-2, 3-5 /HPF    Bacteria, UA None Seen None Seen /HPF    Squamous Epithelial Cells, UA 6-12 (A) None Seen, 0-2 /HPF    Hyaline Casts, UA 3-6 None Seen /LPF    Methodology Automated Microscopy    Urinalysis With Microscopic If Indicated (No Culture) - Urine, Clean Catch   Result Value Ref Range    Color, UA Yellow Yellow, Straw, Dark Yellow, Cathie    Appearance, UA Cloudy (A) Clear    pH, UA 6.5 5.0 - 9.0    Specific Gravity, UA 1.019 1.003 - 1.030    Glucose, UA Negative Negative    Ketones, UA Negative Negative    Bilirubin, UA Negative Negative    Blood, UA Trace (A) Negative    Protein, UA Negative Negative    Leuk Esterase, UA Negative Negative    Nitrite, UA Negative Negative    Urobilinogen, UA 0.2 E.U./dL 0.2 - 1.0 E.U./dL   CBC Auto Differential   Result Value Ref Range    WBC 9.02 3.20 - 9.80 10*3/mm3    RBC 4.64 3.77 - 5.16 10*6/mm3    Hemoglobin 13.9 12.0 - 15.5 g/dL    Hematocrit  40.1 35.0 - 45.0 %    MCV 86.4 80.0 - 98.0 fL    MCH 30.0 26.5 - 34.0 pg    MCHC 34.7 31.4 - 36.0 g/dL    RDW 13.7 11.5 - 14.5 %    RDW-SD 43.1 36.4 - 46.3 fl    MPV 10.3 8.0 - 12.0 fL    Platelets 220 150 - 450 10*3/mm3    Neutrophil % 70.8 37.0 - 80.0 %    Lymphocyte % 18.8 10.0 - 50.0 %    Monocyte % 7.8 0.0 - 12.0 %    Eosinophil % 2.1 0.0 - 7.0 %    Basophil % 0.2 0.0 - 2.0 %    Immature Grans % 0.3 0.0 - 0.5 %    Neutrophils, Absolute 6.38 2.00 - 8.60 10*3/mm3    Lymphocytes, Absolute 1.70 0.60 - 4.20 10*3/mm3    Monocytes, Absolute 0.70 0.00 - 0.90 10*3/mm3    Eosinophils, Absolute 0.19 0.00 - 0.70 10*3/mm3    Basophils, Absolute 0.02 0.00 - 0.20 10*3/mm3    Immature Grans, Absolute 0.03 (H) 0.00 - 0.02 10*3/mm3   Lipase   Result Value Ref Range    Lipase 147 23 - 300 U/L   Amylase   Result Value Ref Range    Amylase 72 50 - 130 U/L   Comprehensive Metabolic Panel   Result Value Ref Range    Glucose 142 (H) 60 - 100 mg/dL    BUN 20 7 - 21 mg/dL    Creatinine 0.85 0.50 - 1.00 mg/dL    Sodium 141 137 - 145 mmol/L    Potassium 3.8 3.5 - 5.1 mmol/L    Chloride 103 95 - 110 mmol/L    CO2 28.0 22.0 - 31.0 mmol/L    Calcium 9.8 8.4 - 10.2 mg/dL    Total Protein 7.2 6.3 - 8.6 g/dL    Albumin 4.00 3.40 - 4.80 g/dL    ALT (SGPT) 53 (H) 9 - 52 U/L    AST (SGOT) 84 (H) 14 - 36 U/L    Alkaline Phosphatase 115 38 - 126 U/L    Total Bilirubin 0.7 0.2 - 1.3 mg/dL    eGFR Non  Amer 68 45 - 104 mL/min/1.73    Globulin 3.2 2.3 - 3.5 gm/dL    A/G Ratio 1.3 1.1 - 1.8 g/dL    BUN/Creatinine Ratio 23.5 7.0 - 25.0    Anion Gap 10.0 5.0 - 15.0 mmol/L     *Note: Due to a large number of results and/or encounters for the requested time period, some results have not been displayed. A complete set of results can be found in Results Review.

## 2019-10-17 ENCOUNTER — OFFICE VISIT (OUTPATIENT)
Dept: OBSTETRICS AND GYNECOLOGY | Facility: CLINIC | Age: 64
End: 2019-10-17

## 2019-10-17 VITALS
DIASTOLIC BLOOD PRESSURE: 76 MMHG | SYSTOLIC BLOOD PRESSURE: 140 MMHG | WEIGHT: 179 LBS | BODY MASS INDEX: 28.77 KG/M2 | HEIGHT: 66 IN

## 2019-10-17 DIAGNOSIS — R10.2 PELVIC PAIN: ICD-10-CM

## 2019-10-17 DIAGNOSIS — Z12.4 CERVICAL CANCER SCREENING: Primary | ICD-10-CM

## 2019-10-17 DIAGNOSIS — N81.4 UTERINE PROLAPSE: ICD-10-CM

## 2019-10-17 PROCEDURE — 87624 HPV HI-RISK TYP POOLED RSLT: CPT | Performed by: OBSTETRICS & GYNECOLOGY

## 2019-10-17 PROCEDURE — G0123 SCREEN CERV/VAG THIN LAYER: HCPCS | Performed by: OBSTETRICS & GYNECOLOGY

## 2019-10-17 PROCEDURE — 99213 OFFICE O/P EST LOW 20 MIN: CPT | Performed by: OBSTETRICS & GYNECOLOGY

## 2019-10-18 PROBLEM — R10.2 PELVIC PAIN: Status: ACTIVE | Noted: 2019-10-18

## 2019-10-18 PROBLEM — Z12.4 CERVICAL CANCER SCREENING: Status: ACTIVE | Noted: 2019-10-18

## 2019-10-18 PROBLEM — N81.4 UTERINE PROLAPSE: Status: ACTIVE | Noted: 2019-10-18

## 2019-10-18 NOTE — PROGRESS NOTES
Humera Swartz is a 64 y.o. y/o female.     Chief Complaint: Here for Pap smear and something coming out of the vagina.    HPI:   64 y.o. .  No LMP recorded. Patient is not currently having periods (Reason: Chemotherapy/radiation)..  Patient complains of something coming out of the vagina.  This is been noted in the past made worse by standing better lying down.  There is a sensation of pressure and some interference with intercourse          Review of Systems   ROS:  CNS: No history of brain malignancy.  HEENT: No history of throat cancer.  Eye: No history of retinal cancer.  Pulmonary: No history of lung cancer.                                                                                 Cardiovascular: No history of cardiac tumors.  Gastrointestinal: No history of small bowel tumors.  Renal: No history of kidney malignancy.  Musculoskeletal: No history of smooth muscle tumors.  Lymphatics: No history of Hodgkin's disease.  Endocrine: No history of thyroid malignancy.    The following portions of the patient's history were reviewed and updated as appropriate: allergies, current medications, past family history, past medical history, past social history, past surgical history and problem list.    Allergies   Allergen Reactions   • Erythromycin Other (See Comments)     SEVERE WEAKNESS   • Phenergan [Promethazine Hcl] Other (See Comments)     weakness   • Propofol Other (See Comments)     COUGH/WHEEZE  Runny nose   • Tetracyclines & Related GI Intolerance        Outpatient Medications Prior to Visit   Medication Sig Dispense Refill   • B Complex-C-E-Zn (B COMPLEX-C-E-ZINC) tablet Take 1 tablet by mouth Daily.     • busPIRone (BUSPAR) 5 MG tablet Take 2.5 mg by mouth Every Night.     • CBD (cannabidiol) oral oil Take  by mouth Daily.     • Cholecalciferol (VITAMIN D-3 PO) Take 1 tablet by mouth Daily.     • dexlansoprazole (DEXILANT) 60 MG capsule Take 1 capsule by mouth Daily. 30 capsule 11   •  fluticasone (FLONASE) 50 MCG/ACT nasal spray 2 sprays into the nostril(s) as directed by provider Daily As Needed for Rhinitis.     • nitrofurantoin (MACRODANTIN) 100 MG capsule Take 100 mg by mouth Daily.     • Prenatal Vit-Fe Fumarate-FA (PRENATAL PO) Take 1 tablet by mouth Daily.     • Probiotic Product (PROBIOTIC DAILY PO) Take 1 capsule by mouth Daily.     • sertraline (ZOLOFT) 50 MG tablet Take 50 mg by mouth Every Night.       No facility-administered medications prior to visit.         The patient has a family history of   Family History   Problem Relation Age of Onset   • Diabetes Other    • Arthritis Other    • Breast cancer Maternal Aunt         Past Medical History:   Diagnosis Date   • Acquired equinus deformity of foot     ANKLE   • Anxiety    • Breast cancer (CMS/HCC)    • Cancer (CMS/HCC)     BREAST   • Depression    • Diverticular disease of colon    • Drug therapy    • Esophagitis    • Fibrocystic breast    • Gall stone    • GERD (gastroesophageal reflux disease)    • History of bone density study 2012    NORMAL   • History of echocardiogram 2016    Normal LV systolic function.Ef of 55-60%.Grade 1 diastolic dysfunciton of the LV myocardium.Mild left atiral enlargement.No evidence of pericardial effusion   • History of mammogram 2011    one breast (Benign finding.)   • History of Papanicolaou smear of cervix 2011    NEGATIVE   • Hx of radiation therapy    • Hyperlipidemia    • Injury of head and neck    • Minor head injury    • Personal history of malignant neoplasm of breast    • Plantar fasciitis    • Primary fibromyalgia syndrome    • Solitary pulmonary nodule present on computed tomography of lung         OB History      Para Term  AB Living    3 2 2   1 2    SAB TAB Ectopic Molar Multiple Live Births    1                     Social History     Socioeconomic History   • Marital status: Single     Spouse name: Not on file   • Number of children: Not on file    • Years of education: Not on file   • Highest education level: Not on file   Tobacco Use   • Smoking status: Former Smoker   • Smokeless tobacco: Never Used   • Tobacco comment: Ceased Smoking 12 Years Prior   Substance and Sexual Activity   • Alcohol use: No   • Drug use: No   • Sexual activity: Defer        Past Surgical History:   Procedure Laterality Date   • BREAST SURGERY      Carcinoma of the right breast;Mastectomy   •  SECTION     • CHOLECYSTECTOMY WITH INTRAOPERATIVE CHOLANGIOGRAM N/A 2018    Procedure: LAPAROSCOPIC POSSIBLE OPEN CHOLECYSTECTOMY WITH INTRAOPERATIVE CHOLANGIOGRAM    (C-Arm # 1);  Surgeon: Dirk Leigh MD;  Location: [facility] OR;  Service: General   • COLONOSCOPY  2016    Normal colon.No specimens collected   • DIAGNOSTIC LAPAROSCOPY  1977    (Amenorrhea, probable polycystic ovaries. Polycystic ovaries   • ENDOSCOPY N/A 2019    Procedure: ESOPHAGOGASTRODUODENOSCOPY possible dilation;  Surgeon: Brooks Reinoso MD;  Location: [facility] ENDOSCOPY;  Service: Gastroenterology   • ESOPHAGOSCOPY / EGD  2016    Mildly severe esophagitis.Gastritis.Normal examined duodenum.Medium sized hiatus hernia   • EXCISION BREAST LESION W/ PREOP NEEDLE LOC  1987    Bilateral excision of breast masses. Bilateral breast masses; probable fibrocystic disease.   • HYSTEROSCOPY  2011    Exam under anesthesia, diagnostic hysterectomy with fractional dilation and curettage. Thickened endometrial stripe, on Tamoxifen therapy.   • MASTECTOMY Right    • OTHER SURGICAL HISTORY  2016    NEEDLE BIOPSY LYMPH NODES; Ultrasound directed core needle biopsy of the left axilla.   • TUBAL ABDOMINAL LIGATION          Patient Active Problem List   Diagnosis   • Urinary tract infection   • HX: breast cancer   • Encounter for screening for osteoporosis   • Status post laparoscopic cholecystectomy   • Dysphagia   • Gastroesophageal reflux disease with esophagitis   • Cervical cancer  "screening   • Pelvic pain   • Uterine prolapse        Documented Vitals    10/17/19 1437   BP: 140/76   Weight: 81.2 kg (179 lb)   Height: 167.6 cm (66\")   PainSc: 0-No pain        Body mass index is 28.89 kg/m².    Physical Exam  Constitutional: Appears to be in no acute distress; Eyes: sclera normal; Endocrine system: thyroid palpate is normal; Pulmonary system: lungs clear; Cardiovascular system: heart regular rate and rhythm; Gastrointestinal system: abdomen soft nontender, active bowel sounds; Urologic system: CVA negative; Psychiatric: appropriate insight; Neurologic: gait within normal limits female genital system external genitalia normal the cervix is through the introitus on Valsalva it comes through further on examination it seems to be an elongation of the cervix more so than a true uterine prolapse I do not detect any adnexal enlargement    Laboratory Data:   Lab Results - Last 18 Months   Lab Units 02/19/19  1135 09/03/18  0310   GLUCOSE mg/dL 94 142*   BUN mg/dL 19 20   CREATININE mg/dL 0.62 0.85   SODIUM mmol/L 137 141   POTASSIUM mmol/L 4.2 3.8   CHLORIDE mmol/L 101 103   CO2 mmol/L 27.0 28.0   CALCIUM mg/dL 9.9 9.8   TOTAL PROTEIN g/dL 8.0 7.2   ALBUMIN g/dL 4.70 4.00   ALT (SGPT) U/L 18 53*   AST (SGOT) U/L 27 84*   ALK PHOS U/L 100 115   BILIRUBIN mg/dL 0.9 0.7   EGFR IF NONAFRICN AM mL/min/1.73 98 68   GLOBULIN gm/dL 3.3 3.2   A/G RATIO g/dL 1.4 1.3   BUN / CREAT RATIO  30.6* 23.5   ANION GAP mmol/L 9.0 10.0     Lab Results - Last 18 Months   Lab Units 02/19/19  1135 09/03/18  0310   WBC 10*3/mm3 6.00 9.02   RBC 10*6/mm3 4.85 4.64   HEMOGLOBIN g/dL 14.4 13.9   HEMATOCRIT % 42.1 40.1   MCV fL 86.8 86.4   MCH pg 29.7 30.0   MCHC g/dL 34.2 34.7   RDW % 13.2 13.7   RDW-SD fl 41.1 43.1   MPV fL 10.0 10.3   PLATELETS 10*3/mm3 237 220   Prior ultrasounds are reviewed the uterus was not that large at that time  No results for input(s): HCGQUAL in the last [id number] hours.    Assessment        Diagnosis Plan "   1. Cervical cancer screening  Liquid-based Pap Smear, Screening   2. Pelvic pain  US Non-ob Transvaginal   3. Uterine prolapse  Ambulatory Referral to Physical Therapy Evaluate and treat       I reviewed options with the patient she wants to try physical therapy will make referral and go from there.  Plan       No orders of the defined types were placed in this encounter.            This document has been electronically signed by Josesito Rivera MD on October 18, 2019 9:03 AM    Please note that portions of this note were completed with a voice recognition program.

## 2019-10-29 LAB
GEN CATEG CVX/VAG CYTO-IMP: NORMAL
LAB AP CASE REPORT: NORMAL
LAB AP GYN ADDITIONAL INFORMATION: NORMAL
PATH INTERP SPEC-IMP: NORMAL
STAT OF ADQ CVX/VAG CYTO-IMP: NORMAL

## 2019-10-31 ENCOUNTER — APPOINTMENT (OUTPATIENT)
Dept: PHYSICAL THERAPY | Facility: HOSPITAL | Age: 64
End: 2019-10-31

## 2019-10-31 LAB — HPV I/H RISK 4 DNA CVX QL PROBE+SIG AMP: NEGATIVE

## 2019-12-04 ENCOUNTER — OFFICE VISIT (OUTPATIENT)
Dept: OBSTETRICS AND GYNECOLOGY | Facility: CLINIC | Age: 64
End: 2019-12-04

## 2019-12-04 VITALS
DIASTOLIC BLOOD PRESSURE: 60 MMHG | BODY MASS INDEX: 27.55 KG/M2 | WEIGHT: 171.4 LBS | HEIGHT: 66 IN | SYSTOLIC BLOOD PRESSURE: 110 MMHG

## 2019-12-04 DIAGNOSIS — N81.4 UTERINE PROLAPSE: Primary | ICD-10-CM

## 2019-12-04 PROCEDURE — 99213 OFFICE O/P EST LOW 20 MIN: CPT | Performed by: OBSTETRICS & GYNECOLOGY

## 2019-12-04 RX ORDER — FLUOXETINE HYDROCHLORIDE 40 MG/1
40 CAPSULE ORAL DAILY
Refills: 2 | COMMUNITY
Start: 2019-11-04 | End: 2020-10-21

## 2019-12-09 NOTE — PROGRESS NOTES
Follow-up uterine prolapse    Ultrasound reviewed with patient today the uterus is actually not very large a volume of about 50 cc retroverted.  It is about 7 cm I think there is some cervical lengthening here.  We had sent her to physical therapy for their evaluation and input but she had not really followed up with them.  I discussed proceeding with surgery its risk benefits alternatives she wants to wait for now offered referral.  Questions answered at length. I spent 16 minutes making  more than 50% of this encounter, being spent in counseling and coordination of care.  Now she wants to do Kegel exercises will let me know if she wants surgery or referral or I can be of assistance

## 2020-01-07 ENCOUNTER — TELEPHONE (OUTPATIENT)
Dept: GASTROENTEROLOGY | Facility: CLINIC | Age: 65
End: 2020-01-07

## 2020-01-07 NOTE — TELEPHONE ENCOUNTER
Contacted patient to get new insurance information in order to submit pa for dexilant. Received information and pa submitted.

## 2020-02-14 DIAGNOSIS — Z85.3 HX: BREAST CANCER: Primary | ICD-10-CM

## 2020-02-19 ENCOUNTER — OFFICE VISIT (OUTPATIENT)
Dept: ONCOLOGY | Facility: CLINIC | Age: 65
End: 2020-02-19

## 2020-02-19 ENCOUNTER — LAB (OUTPATIENT)
Dept: ONCOLOGY | Facility: HOSPITAL | Age: 65
End: 2020-02-19

## 2020-02-19 VITALS
BODY MASS INDEX: 28.91 KG/M2 | SYSTOLIC BLOOD PRESSURE: 160 MMHG | TEMPERATURE: 97.9 F | HEART RATE: 69 BPM | RESPIRATION RATE: 18 BRPM | WEIGHT: 179.9 LBS | HEIGHT: 66 IN | DIASTOLIC BLOOD PRESSURE: 75 MMHG

## 2020-02-19 DIAGNOSIS — Z87.891 PERSONAL HISTORY OF NICOTINE DEPENDENCE: ICD-10-CM

## 2020-02-19 DIAGNOSIS — Z85.3 HX: BREAST CANCER: ICD-10-CM

## 2020-02-19 DIAGNOSIS — Z85.3 HX: BREAST CANCER: Primary | ICD-10-CM

## 2020-02-19 DIAGNOSIS — Z12.39 SCREENING BREAST EXAMINATION: ICD-10-CM

## 2020-02-19 DIAGNOSIS — Z12.31 ENCOUNTER FOR SCREENING MAMMOGRAM FOR MALIGNANT NEOPLASM OF BREAST: ICD-10-CM

## 2020-02-19 LAB
ALBUMIN SERPL-MCNC: 4.2 G/DL (ref 3.5–5.2)
ALBUMIN/GLOB SERPL: 1.6 G/DL
ALP SERPL-CCNC: 108 U/L (ref 39–117)
ALT SERPL W P-5'-P-CCNC: 21 U/L (ref 1–33)
ANION GAP SERPL CALCULATED.3IONS-SCNC: 13 MMOL/L (ref 5–15)
AST SERPL-CCNC: 28 U/L (ref 1–32)
BASOPHILS # BLD AUTO: 0.03 10*3/MM3 (ref 0–0.2)
BASOPHILS NFR BLD AUTO: 0.5 % (ref 0–1.5)
BILIRUB SERPL-MCNC: 0.5 MG/DL (ref 0.2–1.2)
BUN BLD-MCNC: 16 MG/DL (ref 8–23)
BUN/CREAT SERPL: 19 (ref 7–25)
CALCIUM SPEC-SCNC: 9.7 MG/DL (ref 8.6–10.5)
CHLORIDE SERPL-SCNC: 103 MMOL/L (ref 98–107)
CO2 SERPL-SCNC: 23 MMOL/L (ref 22–29)
CREAT BLD-MCNC: 0.84 MG/DL (ref 0.57–1)
DEPRECATED RDW RBC AUTO: 42.2 FL (ref 37–54)
EOSINOPHIL # BLD AUTO: 0.12 10*3/MM3 (ref 0–0.4)
EOSINOPHIL NFR BLD AUTO: 1.9 % (ref 0.3–6.2)
ERYTHROCYTE [DISTWIDTH] IN BLOOD BY AUTOMATED COUNT: 13.1 % (ref 12.3–15.4)
GFR SERPL CREATININE-BSD FRML MDRD: 68 ML/MIN/1.73
GLOBULIN UR ELPH-MCNC: 2.7 GM/DL
GLUCOSE BLD-MCNC: 111 MG/DL (ref 65–99)
HCT VFR BLD AUTO: 38.5 % (ref 34–46.6)
HGB BLD-MCNC: 13.1 G/DL (ref 12–15.9)
IMM GRANULOCYTES # BLD AUTO: 0.02 10*3/MM3 (ref 0–0.05)
IMM GRANULOCYTES NFR BLD AUTO: 0.3 % (ref 0–0.5)
LYMPHOCYTES # BLD AUTO: 1.8 10*3/MM3 (ref 0.7–3.1)
LYMPHOCYTES NFR BLD AUTO: 28.7 % (ref 19.6–45.3)
MCH RBC QN AUTO: 30 PG (ref 26.6–33)
MCHC RBC AUTO-ENTMCNC: 34 G/DL (ref 31.5–35.7)
MCV RBC AUTO: 88.3 FL (ref 79–97)
MONOCYTES # BLD AUTO: 0.41 10*3/MM3 (ref 0.1–0.9)
MONOCYTES NFR BLD AUTO: 6.5 % (ref 5–12)
NEUTROPHILS # BLD AUTO: 3.89 10*3/MM3 (ref 1.7–7)
NEUTROPHILS NFR BLD AUTO: 62.1 % (ref 42.7–76)
NRBC BLD AUTO-RTO: 0 /100 WBC (ref 0–0.2)
PLATELET # BLD AUTO: 216 10*3/MM3 (ref 140–450)
PMV BLD AUTO: 10.4 FL (ref 6–12)
POTASSIUM BLD-SCNC: 4.7 MMOL/L (ref 3.5–5.2)
PROT SERPL-MCNC: 6.9 G/DL (ref 6–8.5)
RBC # BLD AUTO: 4.36 10*6/MM3 (ref 3.77–5.28)
SODIUM BLD-SCNC: 139 MMOL/L (ref 136–145)
WBC NRBC COR # BLD: 6.27 10*3/MM3 (ref 3.4–10.8)

## 2020-02-19 PROCEDURE — G0463 HOSPITAL OUTPT CLINIC VISIT: HCPCS | Performed by: NURSE PRACTITIONER

## 2020-02-19 PROCEDURE — 99213 OFFICE O/P EST LOW 20 MIN: CPT | Performed by: NURSE PRACTITIONER

## 2020-02-19 PROCEDURE — 80053 COMPREHEN METABOLIC PANEL: CPT | Performed by: NURSE PRACTITIONER

## 2020-02-19 PROCEDURE — 85025 COMPLETE CBC W/AUTO DIFF WBC: CPT | Performed by: NURSE PRACTITIONER

## 2020-02-20 NOTE — PROGRESS NOTES
DATE OF VISIT: 2020    REASON FOR VISIT:  Yearly follow-up for breast cancer    HISTORY OF PRESENT ILLNESS:  65-year-old female with a past medical history significant for right-sided breast cancer, stage III diagnosed in , status post mastectomy followed by chemotherapy and radiation. She completed 10 yrs of adjuvant hormonal therapy with Tamoxifen followed by Arimidex. She stopped Arimidex in 2018. She was last seen here in 2019 and was placed on yearly follow-up.     PAST MEDICAL HISTORY:    Past Medical History:   Diagnosis Date   • Acquired equinus deformity of foot     ANKLE   • Anxiety    • Breast cancer (CMS/HCC)    • Cancer (CMS/HCC)     BREAST   • Depression    • Diverticular disease of colon    • Drug therapy    • Esophagitis    • Fibrocystic breast    • Gall stone    • GERD (gastroesophageal reflux disease)    • History of bone density study 2012    NORMAL   • History of echocardiogram 2016    Normal LV systolic function.Ef of 55-60%.Grade 1 diastolic dysfunciton of the LV myocardium.Mild left atiral enlargement.No evidence of pericardial effusion   • History of mammogram 2011    one breast (Benign finding.)   • History of Papanicolaou smear of cervix 2011    NEGATIVE   • Hx of radiation therapy    • Hyperlipidemia    • Injury of head and neck    • Minor head injury    • Personal history of malignant neoplasm of breast    • Plantar fasciitis    • Primary fibromyalgia syndrome    • Solitary pulmonary nodule present on computed tomography of lung        SOCIAL HISTORY:    Social History     Tobacco Use   • Smoking status: Former Smoker   • Smokeless tobacco: Never Used   • Tobacco comment: Ceased Smoking 12 Years Prior   Substance Use Topics   • Alcohol use: No   • Drug use: No       Surgical History :  Past Surgical History:   Procedure Laterality Date   • BREAST SURGERY      Carcinoma of the right breast;Mastectomy   •  SECTION     •  CHOLECYSTECTOMY WITH INTRAOPERATIVE CHOLANGIOGRAM N/A 9/11/2018    Procedure: LAPAROSCOPIC POSSIBLE OPEN CHOLECYSTECTOMY WITH INTRAOPERATIVE CHOLANGIOGRAM    (C-Arm # 1);  Surgeon: Dirk Leigh MD;  Location: [facility] OR;  Service: General   • COLONOSCOPY  02/22/2016    Normal colon.No specimens collected   • DIAGNOSTIC LAPAROSCOPY  11/07/1977    (Amenorrhea, probable polycystic ovaries. Polycystic ovaries   • ENDOSCOPY N/A 8/20/2019    Procedure: ESOPHAGOGASTRODUODENOSCOPY possible dilation;  Surgeon: Brooks Reinoso MD;  Location: [facility] ENDOSCOPY;  Service: Gastroenterology   • ESOPHAGOSCOPY / EGD  02/22/2016    Mildly severe esophagitis.Gastritis.Normal examined duodenum.Medium sized hiatus hernia   • EXCISION BREAST LESION W/ PREOP NEEDLE LOC  06/17/1987    Bilateral excision of breast masses. Bilateral breast masses; probable fibrocystic disease.   • HYSTEROSCOPY  12/13/2011    Exam under anesthesia, diagnostic hysterectomy with fractional dilation and curettage. Thickened endometrial stripe, on Tamoxifen therapy.   • MASTECTOMY Right    • OTHER SURGICAL HISTORY  02/12/2016    NEEDLE BIOPSY LYMPH NODES; Ultrasound directed core needle biopsy of the left axilla.   • TUBAL ABDOMINAL LIGATION         ALLERGIES:    Allergies   Allergen Reactions   • Erythromycin Other (See Comments)     SEVERE WEAKNESS   • Phenergan [Promethazine Hcl] Other (See Comments)     weakness   • Propofol Other (See Comments)     COUGH/WHEEZE  Runny nose   • Tetracyclines & Related GI Intolerance       REVIEW OF SYSTEMS:      CONSTITUTIONAL:  No fever, chills, or night sweats.     HEENT:  No epistaxis, mouth sores, or difficulty swallowing.    RESPIRATORY:  No new shortness of breath or cough at present.    CARDIOVASCULAR:  No chest pain or palpitations.    GASTROINTESTINAL:  No abdominal pain, nausea, vomiting, or blood in the stool.    GENITOURINARY:  No dysuria or hematuria.    MUSCULOSKELETAL:  No any new back pain or arthralgias.      "    NEUROLOGICAL:  No tingling or numbness. No new headache or dizziness.     LYMPHATICS:  Denies any abnormal swollen and anywhere in the body.    SKIN:  Denies any new skin rash.    PHYSICAL EXAMINATION:      VITAL SIGNS:  /75   Pulse 69   Temp 97.9 °F (36.6 °C) (Temporal)   Resp 18   Ht 167.6 cm (65.98\")   Wt 81.6 kg (179 lb 14.4 oz)   BMI 29.05 kg/m²     GENERAL:  Not in any distress.    HEENT:  Normocephalic, Atraumatic.Mild Conjunctival pallor. No icterus. No Facial Asymmetry noted.    NECK:  No adenopathy. No JVD.    RESPIRATORY:  Fair air entry bilateral. No rhonchi or wheezing.    CARDIOVASCULAR:  S1, S2. Regular rate and rhythm. No murmur or gallop appreciated.    ABDOMEN:  Soft, obese, nontender. Bowel sounds present in all four quadrants.  No organomegaly appreciated.    EXTREMITIES:  No edema.No Calf Tenderness.    NEUROLOGIC:  Alert, awake and oriented ×3.   SKIN : No new skin lesion identified    Breast; Left breast examined and no palpable lumps/mass; no nipple discharge and no axillary adenopathy  DIAGNOSTIC DATA:    Glucose   Date Value Ref Range Status   02/19/2020 111 (H) 65 - 99 mg/dL Final     Sodium   Date Value Ref Range Status   02/19/2020 139 136 - 145 mmol/L Final     Potassium   Date Value Ref Range Status   02/19/2020 4.7 3.5 - 5.2 mmol/L Final     CO2   Date Value Ref Range Status   02/19/2020 23.0 22.0 - 29.0 mmol/L Final     Chloride   Date Value Ref Range Status   02/19/2020 103 98 - 107 mmol/L Final     Anion Gap   Date Value Ref Range Status   02/19/2020 13.0 5.0 - 15.0 mmol/L Final     Creatinine   Date Value Ref Range Status   02/19/2020 0.84 0.57 - 1.00 mg/dL Final     BUN   Date Value Ref Range Status   02/19/2020 16 8 - 23 mg/dL Final     BUN/Creatinine Ratio   Date Value Ref Range Status   02/19/2020 19.0 7.0 - 25.0 Final     Calcium   Date Value Ref Range Status   02/19/2020 9.7 8.6 - 10.5 mg/dL Final     eGFR Non  Amer   Date Value Ref Range Status "   02/19/2020 68 >60 mL/min/1.73 Final     Alkaline Phosphatase   Date Value Ref Range Status   02/19/2020 108 39 - 117 U/L Final     Total Protein   Date Value Ref Range Status   02/19/2020 6.9 6.0 - 8.5 g/dL Final     ALT (SGPT)   Date Value Ref Range Status   02/19/2020 21 1 - 33 U/L Final     AST (SGOT)   Date Value Ref Range Status   02/19/2020 28 1 - 32 U/L Final     Total Bilirubin   Date Value Ref Range Status   02/19/2020 0.5 0.2 - 1.2 mg/dL Final     Albumin   Date Value Ref Range Status   02/19/2020 4.20 3.50 - 5.20 g/dL Final     Globulin   Date Value Ref Range Status   02/19/2020 2.7 gm/dL Final     Lab Results   Component Value Date    WBC 6.27 02/19/2020    HGB 13.1 02/19/2020    HCT 38.5 02/19/2020    MCV 88.3 02/19/2020     02/19/2020     Lab Results   Component Value Date    NEUTROABS 3.89 02/19/2020    IRON 42 11/23/2016    TIBC 338 11/23/2016    LABIRON 12.4 (L) 11/23/2016     Lab Results   Component Value Date    LABCA2 22.9 01/10/2017   ]      RADIOLOGY DATA :  PROCEDURE: Unilateral left digital screening mammogram  12/4/2019     HISTORY: Routine screening mammography. Patient has undergone  right mastectomy for breast cancer.     COMPARISON: Prior exams dated 2/11/2019 through 5/4/2018     NOTE: Computer-aided detection was utilized during this exam.   Digital breast tomosynthesis was performed.     FINDINGS:   CC and MLO views are obtained of the left breast.      Parenchymal pattern: There are scattered areas of fibroglandular  density.  No suspicious mass, architectural distortion or suspicious  microcalcifications.     IMPRESSION:  CONCLUSION:    No mammographic evidence of malignancy.  In the absence of  suspicious clinical or physical findings, routine follow-up  mammography is recommended in one year.     BIRADS Category 2: Benign findings      ASSESSMENT AND PLAN:       1. Infiltrating ductal carcinoma of right breast, stage III, T2 N2, ER positive ER positive HER-2/ary negative  by fish diagnosed on March 10, 2007.  Status post mastectomy on right side with lymph node dissection.  Patient received adjuvant chemotherapy in form of Adriamycin and Cytoxan followed by Taxol.  Subsequently patient received adjuvant radiation therapy .  Patient initially received tamoxifen from 2008 until January 2013.  After that in view of 4 lymph node being positive she was changed over to Arimidex in January 2013 and completed 5 additional yrs of therapy; completed in January 2018. She is currently on yearly observation. Her most recent screening left breast mammogram was BI-RADS 2. Will plan to continue with yearly screening left mammogram and see her once yearly; pt educated on monthly self breast exam.      2. Hypertension, /75; defer to her PCP     3. Health maintenance, she does not currently smoke but she has smoked within past 10 yrs and she has a 30 yr pack history; discussed lung cancer screening and she is interested in pursuing;will place orders. She had colonoscopy in 2016 and follows with GYN yearly.    Patient's Body mass index is 29.05 kg/m². BMI is above normal parameters. Recommendations include: exercise counseling and nutrition counseling.    This document has been signed by DAVID Ferguson on February 20, 2020 2:19 PM

## 2020-02-28 ENCOUNTER — APPOINTMENT (OUTPATIENT)
Dept: CT IMAGING | Facility: HOSPITAL | Age: 65
End: 2020-02-28

## 2020-03-13 ENCOUNTER — HOSPITAL ENCOUNTER (OUTPATIENT)
Dept: CT IMAGING | Facility: HOSPITAL | Age: 65
Discharge: HOME | End: 2020-03-13
Admitting: NURSE PRACTITIONER

## 2020-03-13 DIAGNOSIS — Z87.891 PERSONAL HISTORY OF NICOTINE DEPENDENCE: ICD-10-CM

## 2020-03-13 DIAGNOSIS — Z85.3 HX: BREAST CANCER: ICD-10-CM

## 2020-03-13 PROCEDURE — G0297 LDCT FOR LUNG CA SCREEN: HCPCS

## 2020-03-16 ENCOUNTER — APPOINTMENT (OUTPATIENT)
Dept: ONCOLOGY | Facility: HOSPITAL | Age: 65
End: 2020-03-16

## 2020-03-16 ENCOUNTER — APPOINTMENT (OUTPATIENT)
Dept: ONCOLOGY | Facility: CLINIC | Age: 65
End: 2020-03-16

## 2020-03-17 RX ORDER — DEXLANSOPRAZOLE 60 MG/1
CAPSULE, DELAYED RELEASE ORAL
Qty: 30 CAPSULE | Refills: 3 | Status: SHIPPED | OUTPATIENT
Start: 2020-03-17 | End: 2021-03-08

## 2020-03-24 ENCOUNTER — TELEPHONE (OUTPATIENT)
Dept: ONCOLOGY | Facility: CLINIC | Age: 65
End: 2020-03-24

## 2020-03-24 DIAGNOSIS — R91.1 PULMONARY NODULE: Primary | ICD-10-CM

## 2020-03-24 NOTE — TELEPHONE ENCOUNTER
I called patient and discussed results and placed order for PET/CT scan for this Friday  Also pt needs appt to see me Tuesday for results    Thanks

## 2020-03-27 ENCOUNTER — HOSPITAL ENCOUNTER (OUTPATIENT)
Dept: PET IMAGING | Facility: HOSPITAL | Age: 65
Discharge: HOME | End: 2020-03-27
Admitting: NURSE PRACTITIONER

## 2020-03-27 DIAGNOSIS — R91.1 PULMONARY NODULE: ICD-10-CM

## 2020-03-27 PROCEDURE — A9552 F18 FDG: HCPCS | Performed by: NURSE PRACTITIONER

## 2020-03-27 PROCEDURE — 78815 PET IMAGE W/CT SKULL-THIGH: CPT

## 2020-03-27 PROCEDURE — 0 FLUDEOXYGLUCOSE F18 SOLUTION: Performed by: NURSE PRACTITIONER

## 2020-03-27 RX ADMIN — FLUDEOXYGLUCOSE F18 1 DOSE: 300 INJECTION INTRAVENOUS at 10:07

## 2020-03-30 ENCOUNTER — TELEPHONE (OUTPATIENT)
Dept: ONCOLOGY | Facility: CLINIC | Age: 65
End: 2020-03-30

## 2020-03-31 ENCOUNTER — TELEPHONE (OUTPATIENT)
Dept: ONCOLOGY | Facility: CLINIC | Age: 65
End: 2020-03-31

## 2020-03-31 ENCOUNTER — APPOINTMENT (OUTPATIENT)
Dept: ONCOLOGY | Facility: CLINIC | Age: 65
End: 2020-03-31

## 2020-03-31 DIAGNOSIS — Z85.3 HX: BREAST CANCER: Primary | ICD-10-CM

## 2020-03-31 DIAGNOSIS — R91.1 PULMONARY NODULE: ICD-10-CM

## 2020-03-31 NOTE — TELEPHONE ENCOUNTER
Called patient and informed of PET/CT results; appears area in RLL is inflammation and not likely malignant based off SUV; will plan to repeat CT scan in 6 mos; this was discussed with patient and she voiced understanding.

## 2020-04-09 ENCOUNTER — TELEPHONE (OUTPATIENT)
Dept: ONCOLOGY | Facility: CLINIC | Age: 65
End: 2020-04-09

## 2020-04-09 NOTE — TELEPHONE ENCOUNTER
marshal from [employer] sent a fax on 04/07/20 for patient to get prothesis bras and breast. Wants to know if the office received the fax.      Call marshal back at [phone]

## 2020-04-09 NOTE — TELEPHONE ENCOUNTER
Informed [employer] we have not received order. They verbalized understanding and are going to refax it.

## 2020-04-14 ENCOUNTER — APPOINTMENT (OUTPATIENT)
Dept: GENERAL RADIOLOGY | Facility: HOSPITAL | Age: 65
End: 2020-04-14

## 2020-04-14 ENCOUNTER — HOSPITAL ENCOUNTER (EMERGENCY)
Facility: HOSPITAL | Age: 65
Discharge: HOME | End: 2020-04-15
Attending: EMERGENCY MEDICINE

## 2020-04-14 DIAGNOSIS — R06.02 SHORTNESS OF BREATH: ICD-10-CM

## 2020-04-14 DIAGNOSIS — I10 HYPERTENSION, UNSPECIFIED TYPE: Primary | ICD-10-CM

## 2020-04-14 DIAGNOSIS — J18.9 PNEUMONIA OF RIGHT MIDDLE LOBE DUE TO INFECTIOUS ORGANISM: ICD-10-CM

## 2020-04-14 DIAGNOSIS — R05.9 COUGH: ICD-10-CM

## 2020-04-14 LAB
BASOPHILS # BLD AUTO: 0.04 10*3/MM3 (ref 0–0.2)
BASOPHILS NFR BLD AUTO: 0.6 % (ref 0–1.5)
BILIRUB UR QL STRIP: NEGATIVE
CLARITY UR: CLEAR
COLOR UR: YELLOW
DEPRECATED RDW RBC AUTO: 41.7 FL (ref 37–54)
EOSINOPHIL # BLD AUTO: 0.13 10*3/MM3 (ref 0–0.4)
EOSINOPHIL NFR BLD AUTO: 2.1 % (ref 0.3–6.2)
ERYTHROCYTE [DISTWIDTH] IN BLOOD BY AUTOMATED COUNT: 12.8 % (ref 12.3–15.4)
GLUCOSE UR STRIP-MCNC: NEGATIVE MG/DL
HCT VFR BLD AUTO: 38.8 % (ref 34–46.6)
HGB BLD-MCNC: 13.4 G/DL (ref 12–15.9)
HGB UR QL STRIP.AUTO: NEGATIVE
IMM GRANULOCYTES # BLD AUTO: 0.02 10*3/MM3 (ref 0–0.05)
IMM GRANULOCYTES NFR BLD AUTO: 0.3 % (ref 0–0.5)
KETONES UR QL STRIP: NEGATIVE
LEUKOCYTE ESTERASE UR QL STRIP.AUTO: NEGATIVE
LYMPHOCYTES # BLD AUTO: 2.22 10*3/MM3 (ref 0.7–3.1)
LYMPHOCYTES NFR BLD AUTO: 35.2 % (ref 19.6–45.3)
MCH RBC QN AUTO: 30.7 PG (ref 26.6–33)
MCHC RBC AUTO-ENTMCNC: 34.5 G/DL (ref 31.5–35.7)
MCV RBC AUTO: 88.8 FL (ref 79–97)
MONOCYTES # BLD AUTO: 0.44 10*3/MM3 (ref 0.1–0.9)
MONOCYTES NFR BLD AUTO: 7 % (ref 5–12)
NEUTROPHILS # BLD AUTO: 3.45 10*3/MM3 (ref 1.7–7)
NEUTROPHILS NFR BLD AUTO: 54.8 % (ref 42.7–76)
NITRITE UR QL STRIP: NEGATIVE
NRBC BLD AUTO-RTO: 0 /100 WBC (ref 0–0.2)
PH UR STRIP.AUTO: 6 [PH] (ref 5–9)
PLATELET # BLD AUTO: 226 10*3/MM3 (ref 140–450)
PMV BLD AUTO: 10.1 FL (ref 6–12)
PROT UR QL STRIP: NEGATIVE
RBC # BLD AUTO: 4.37 10*6/MM3 (ref 3.77–5.28)
S PYO AG THROAT QL: NEGATIVE
SP GR UR STRIP: 1.01 (ref 1–1.03)
UROBILINOGEN UR QL STRIP: NORMAL
WBC NRBC COR # BLD: 6.3 10*3/MM3 (ref 3.4–10.8)

## 2020-04-14 PROCEDURE — 81003 URINALYSIS AUTO W/O SCOPE: CPT | Performed by: EMERGENCY MEDICINE

## 2020-04-14 PROCEDURE — 83880 ASSAY OF NATRIURETIC PEPTIDE: CPT | Performed by: EMERGENCY MEDICINE

## 2020-04-14 PROCEDURE — 80053 COMPREHEN METABOLIC PANEL: CPT | Performed by: EMERGENCY MEDICINE

## 2020-04-14 PROCEDURE — 99284 EMERGENCY DEPT VISIT MOD MDM: CPT

## 2020-04-14 PROCEDURE — 85025 COMPLETE CBC W/AUTO DIFF WBC: CPT | Performed by: EMERGENCY MEDICINE

## 2020-04-14 PROCEDURE — 87880 STREP A ASSAY W/OPTIC: CPT | Performed by: EMERGENCY MEDICINE

## 2020-04-14 PROCEDURE — 84484 ASSAY OF TROPONIN QUANT: CPT | Performed by: EMERGENCY MEDICINE

## 2020-04-14 PROCEDURE — 87081 CULTURE SCREEN ONLY: CPT | Performed by: EMERGENCY MEDICINE

## 2020-04-14 PROCEDURE — 71045 X-RAY EXAM CHEST 1 VIEW: CPT

## 2020-04-14 PROCEDURE — 87804 INFLUENZA ASSAY W/OPTIC: CPT | Performed by: EMERGENCY MEDICINE

## 2020-04-14 RX ORDER — SODIUM CHLORIDE 0.9 % (FLUSH) 0.9 %
10 SYRINGE (ML) INJECTION AS NEEDED
Status: DISCONTINUED | OUTPATIENT
Start: 2020-04-14 | End: 2020-04-15 | Stop reason: HOSPADM

## 2020-04-14 RX ORDER — CLONIDINE HYDROCHLORIDE 0.1 MG/1
0.1 TABLET ORAL ONCE
Status: COMPLETED | OUTPATIENT
Start: 2020-04-14 | End: 2020-04-14

## 2020-04-14 RX ADMIN — CLONIDINE HYDROCHLORIDE 0.1 MG: 0.1 TABLET ORAL at 23:35

## 2020-04-15 VITALS
TEMPERATURE: 98.4 F | BODY MASS INDEX: 29.83 KG/M2 | OXYGEN SATURATION: 96 % | HEIGHT: 66 IN | RESPIRATION RATE: 16 BRPM | DIASTOLIC BLOOD PRESSURE: 67 MMHG | HEART RATE: 54 BPM | WEIGHT: 185.6 LBS | SYSTOLIC BLOOD PRESSURE: 149 MMHG

## 2020-04-15 LAB
ALBUMIN SERPL-MCNC: 4.3 G/DL (ref 3.5–5.2)
ALBUMIN/GLOB SERPL: 1.6 G/DL
ALP SERPL-CCNC: 107 U/L (ref 39–117)
ALT SERPL W P-5'-P-CCNC: 23 U/L (ref 1–33)
ANION GAP SERPL CALCULATED.3IONS-SCNC: 14 MMOL/L (ref 5–15)
AST SERPL-CCNC: 22 U/L (ref 1–32)
BILIRUB SERPL-MCNC: 0.3 MG/DL (ref 0.2–1.2)
BUN BLD-MCNC: 13 MG/DL (ref 8–23)
BUN/CREAT SERPL: 17.6 (ref 7–25)
CALCIUM SPEC-SCNC: 9.7 MG/DL (ref 8.6–10.5)
CHLORIDE SERPL-SCNC: 103 MMOL/L (ref 98–107)
CO2 SERPL-SCNC: 24 MMOL/L (ref 22–29)
CREAT BLD-MCNC: 0.74 MG/DL (ref 0.57–1)
FLUAV AG NPH QL: NEGATIVE
FLUBV AG NPH QL IA: NEGATIVE
GFR SERPL CREATININE-BSD FRML MDRD: 79 ML/MIN/1.73
GLOBULIN UR ELPH-MCNC: 2.7 GM/DL
GLUCOSE BLD-MCNC: 124 MG/DL (ref 65–99)
HOLD SPECIMEN: NORMAL
HOLD SPECIMEN: NORMAL
NT-PROBNP SERPL-MCNC: 133.7 PG/ML (ref 5–900)
POTASSIUM BLD-SCNC: 3.8 MMOL/L (ref 3.5–5.2)
PROT SERPL-MCNC: 7 G/DL (ref 6–8.5)
SODIUM BLD-SCNC: 141 MMOL/L (ref 136–145)
TROPONIN T SERPL-MCNC: <0.01 NG/ML (ref 0–0.03)
WHOLE BLOOD HOLD SPECIMEN: NORMAL
WHOLE BLOOD HOLD SPECIMEN: NORMAL

## 2020-04-15 PROCEDURE — U0004 COV-19 TEST NON-CDC HGH THRU: HCPCS | Performed by: FAMILY MEDICINE

## 2020-04-15 PROCEDURE — U0002 COVID-19 LAB TEST NON-CDC: HCPCS | Performed by: FAMILY MEDICINE

## 2020-04-15 PROCEDURE — 93005 ELECTROCARDIOGRAM TRACING: CPT | Performed by: EMERGENCY MEDICINE

## 2020-04-15 PROCEDURE — 93010 ELECTROCARDIOGRAM REPORT: CPT | Performed by: INTERNAL MEDICINE

## 2020-04-15 RX ORDER — LEVOFLOXACIN 750 MG/1
750 TABLET ORAL DAILY
Qty: 9 TABLET | Refills: 0 | Status: SHIPPED | OUTPATIENT
Start: 2020-04-15 | End: 2020-10-21

## 2020-04-15 RX ORDER — LEVOFLOXACIN 750 MG/1
750 TABLET ORAL ONCE
Status: COMPLETED | OUTPATIENT
Start: 2020-04-15 | End: 2020-04-15

## 2020-04-15 RX ADMIN — LEVOFLOXACIN 750 MG: 750 TABLET, FILM COATED ORAL at 02:39

## 2020-04-15 NOTE — ED PROVIDER NOTES
"Subjective   65-year-old female presents to the emergency department with complaint of cough, sore throat, and generalized weakness and body aches.  She states that she has not had any fever.  She reports that her cough seems to come from the back of her throat denies any chest pain.  She did feel short of breath yesterday.  Symptoms began 3 to 4 days ago.  Patient states that she has a known mass in her lung that is supposed to be noncancerous and they are following it.  She reports that she is been taking Mucinex and over the counter decongestants with minimal improvement.  She states cough is nonproductive.  She states that she is very anxious about the coronavirus and she has had \"healthcare workers that she knows tell her that it changes very quickly and she should be evaluated\".  She does admit that she is a very anxious person.  She reports that she has had no fever.  Does not believe that she has had any known sick contacts.    Family history, surgical history, social history, current medications and allergies are reviewed with the patient and triage documentation and vitals are reviewed.      History provided by:  Patient   used: No        Review of Systems   Constitutional: Positive for fatigue. Negative for appetite change, chills, diaphoresis and fever.   HENT: Positive for congestion, postnasal drip and sore throat. Negative for ear discharge, ear pain, sinus pressure, sinus pain, sneezing and trouble swallowing.    Eyes: Negative for pain, discharge and redness.   Respiratory: Positive for cough and shortness of breath. Negative for chest tightness and wheezing.    Cardiovascular: Negative for chest pain, palpitations and leg swelling.   Gastrointestinal: Negative for abdominal pain, constipation, diarrhea, nausea and vomiting.   Endocrine: Negative.    Genitourinary: Negative for dysuria, frequency and urgency.   Musculoskeletal: Positive for myalgias. Negative for arthralgias, " back pain and neck pain.   Skin: Negative for color change, pallor, rash and wound.   Allergic/Immunologic: Negative.    Neurological: Negative.    Hematological: Negative.    Psychiatric/Behavioral: Negative.        Past Medical History:   Diagnosis Date   • Acquired equinus deformity of foot     ANKLE   • Anxiety    • Breast cancer (CMS/HCC)    • Cancer (CMS/HCC)     BREAST   • Depression    • Diverticular disease of colon    • Drug therapy    • Esophagitis    • Fibrocystic breast    • Gall stone    • GERD (gastroesophageal reflux disease)    • History of bone density study 2012    NORMAL   • History of echocardiogram 2016    Normal LV systolic function.Ef of 55-60%.Grade 1 diastolic dysfunciton of the LV myocardium.Mild left atiral enlargement.No evidence of pericardial effusion   • History of mammogram 2011    one breast (Benign finding.)   • History of Papanicolaou smear of cervix 2011    NEGATIVE   • Hx of radiation therapy    • Hyperlipidemia    • Injury of head and neck    • Minor head injury    • Personal history of malignant neoplasm of breast    • Plantar fasciitis    • Primary fibromyalgia syndrome    • Solitary pulmonary nodule present on computed tomography of lung        Allergies   Allergen Reactions   • Erythromycin Other (See Comments)     SEVERE WEAKNESS   • Phenergan [Promethazine Hcl] Other (See Comments)     weakness   • Propofol Other (See Comments)     COUGH/WHEEZE  Runny nose   • Tetracyclines & Related GI Intolerance       Past Surgical History:   Procedure Laterality Date   • BREAST SURGERY      Carcinoma of the right breast;Mastectomy   •  SECTION     • CHOLECYSTECTOMY WITH INTRAOPERATIVE CHOLANGIOGRAM N/A 2018    Procedure: LAPAROSCOPIC POSSIBLE OPEN CHOLECYSTECTOMY WITH INTRAOPERATIVE CHOLANGIOGRAM    (C-Arm # 1);  Surgeon: Dirk Leigh MD;  Location: [facility];  Service: General   • COLONOSCOPY  2016    Normal colon.No specimens collected   •  DIAGNOSTIC LAPAROSCOPY  11/07/1977    (Amenorrhea, probable polycystic ovaries. Polycystic ovaries   • ENDOSCOPY N/A 8/20/2019    Procedure: ESOPHAGOGASTRODUODENOSCOPY possible dilation;  Surgeon: Brooks Reinoso MD;  Location: [facility] ENDOSCOPY;  Service: Gastroenterology   • ESOPHAGOSCOPY / EGD  02/22/2016    Mildly severe esophagitis.Gastritis.Normal examined duodenum.Medium sized hiatus hernia   • EXCISION BREAST LESION W/ PREOP NEEDLE LOC  06/17/1987    Bilateral excision of breast masses. Bilateral breast masses; probable fibrocystic disease.   • HYSTEROSCOPY  12/13/2011    Exam under anesthesia, diagnostic hysterectomy with fractional dilation and curettage. Thickened endometrial stripe, on Tamoxifen therapy.   • MASTECTOMY Right    • OTHER SURGICAL HISTORY  02/12/2016    NEEDLE BIOPSY LYMPH NODES; Ultrasound directed core needle biopsy of the left axilla.   • TUBAL ABDOMINAL LIGATION         Family History   Problem Relation Age of Onset   • Diabetes Other    • Arthritis Other    • Breast cancer Maternal Aunt        Social History     Socioeconomic History   • Marital status: Single     Spouse name: Not on file   • Number of children: Not on file   • Years of education: Not on file   • Highest education level: Not on file   Tobacco Use   • Smoking status: Former Smoker   • Smokeless tobacco: Never Used   • Tobacco comment: Ceased Smoking 12 Years Prior   Substance and Sexual Activity   • Alcohol use: No   • Drug use: No   • Sexual activity: Defer           Objective   Physical Exam   Constitutional: She is oriented to person, place, and time. She appears well-developed and well-nourished.  Non-toxic appearance. She does not appear ill. No distress.   HENT:   Head: Normocephalic.   Right Ear: Tympanic membrane and ear canal normal. No tenderness. No middle ear effusion.   Left Ear: Tympanic membrane and ear canal normal. No tenderness.  No middle ear effusion.   Mouth/Throat: Mucous membranes are normal.  Posterior oropharyngeal edema and posterior oropharyngeal erythema present. No oropharyngeal exudate. Tonsils are 2+ on the right. Tonsils are 2+ on the left. No tonsillar exudate.   Eyes: Pupils are equal, round, and reactive to light.   Neck: Normal range of motion. Neck supple.   Cardiovascular: Normal rate, regular rhythm, normal heart sounds and intact distal pulses.   No murmur heard.  Pulmonary/Chest: Effort normal and breath sounds normal. No respiratory distress. She has no wheezes. She has no rhonchi. She has no rales.   Abdominal: Soft. Bowel sounds are normal. She exhibits no distension. There is no tenderness. There is no rebound and no guarding.   Neurological: She is alert and oriented to person, place, and time.   Skin: Skin is warm and dry. Capillary refill takes less than 2 seconds. No rash noted. She is not diaphoretic.   Psychiatric: Her mood appears anxious.   Nursing note and vitals reviewed.      Procedures  none         ED Course  ED Course as of Apr 15 0227   Wed Apr 15, 2020   0226 Patient was advised to follow-up with primary care for COVID-19 test results.  In the interim, she is to take the Levaquin for her pneumonia.    [CB]      ED Course User Index  [CB] Mohsen Beckman MD      Labs Reviewed   COMPREHENSIVE METABOLIC PANEL - Abnormal; Notable for the following components:       Result Value    Glucose 124 (*)     All other components within normal limits    Narrative:     GFR Normal >60  Chronic Kidney Disease <60  Kidney Failure <15     RAPID STREP A SCREEN - Normal   INFLUENZA ANTIGEN, RAPID - Normal   URINALYSIS W/ MICROSCOPIC IF INDICATED (NO CULTURE) - Normal    Narrative:     Urine microscopic not indicated.   CBC WITH AUTO DIFFERENTIAL - Normal   TROPONIN (IN-HOUSE) - Normal    Narrative:     Troponin T Reference Range:  <= 0.03 ng/mL-   Negative for AMI  >0.03 ng/mL-     Abnormal for myocardial necrosis.  Clinicians would have to utilize clinical acumen, EKG, Troponin  and serial changes to determine if it is an Acute Myocardial Infarction or myocardial injury due to an underlying chronic condition.       Results may be falsely decreased if patient taking Biotin.     BNP (IN-HOUSE) - Normal    Narrative:     Among patients with dyspnea, NT-proBNP is highly sensitive for the detection of acute congestive heart failure. In addition NT-proBNP of <300 pg/ml effectively rules out acute congestive heart failure with 99% negative predictive value.    Results may be falsely decreased if patient taking Biotin.     BETA HEMOLYTIC STREP CULTURE, THROAT   CORONAVIRUS (COVID-19),RT-PCR, [facility], NP SWAB IN [facility]   RAINBOW DRAW    Narrative:     The following orders were created for panel order [place] Draw.  Procedure                               Abnormality         Status                     ---------                               -----------         ------                     Light Blue Top[[id number]]                                   Final result               Green Top (Gel)[[id number]]                                  Final result               Lavender Top[[id number]]                                     Final result               Gold Top - SST[[id number]]                                   Final result                 Please view results for these tests on the individual orders.   LIGHT BLUE TOP   GREEN TOP   LAVENDER TOP   GOLD TOP - SST   CBC AND DIFFERENTIAL    Narrative:     The following orders were created for panel order CBC & Differential.  Procedure                               Abnormality         Status                     ---------                               -----------         ------                     CBC Auto Differential[[id number]]        Normal              Final result                 Please view results for these tests on the individual orders.     Xr Chest 1 View    Result Date: 4/15/2020  Narrative: History:  cough, fever, weakness, htn Procedure:  XR CHEST 1 VIEW Comparison:  September 3, 2018 Findings:  Portable view of the chest was obtained at April 14, 2020.  The heart size is within normal limits.  There is stranding opacity partially obscuring the right heart border, consistent with mild or early right middle lobe pneumonia given the patient's history.  The left lung is clear. There is no definite pleural effusion or pneumothorax.     Impression: Impression: Mild/early right middle lobe pneumonia. Electronically signed by:  See Chau MD  4/15/2020 12:12 AM CDT Workstation: [id number]    Nm Pet Skull Base To Mid Thigh    Result Date: 3/27/2020  Narrative: Procedure: Nuclear medicine PET scan skull base to mid thigh/CT CLINICAL INDICATION:  Pulmonary nodule right lower lobe. CORRELATIVE IMAGING:  NonePET-CT TECHNIQUE:  Dose:  13.4 mCi of F-18 FDG, I.V. Contrast:  I.V.  none ; oral none Glucose:  107 mg / dl Additional dedicated imaging was performed of the head & neck.  Anatomic imaging was performed during normal tidal respiration which potentially creates edge distortion due to motion artifact(s). WITHOUT i.v. / oral contrast The CT examination was performed without intravenous / oral contrast for the purpose of attenuation correction and is considered limited and 'non-diagnostic'  which precludes evaluation of solid organ parenchyma, characterization of solid masses, or depiction of subtle vascular pathology.  These non-diagnostic, non-contrast enhanced CT images obtained during tidal respiration, provide only a limited assessment of solid and hollow viscera, and do not replace diagnostic quality contrast enhanced CT scans. The PET and CT images were reconstructed in the axial, coronal, and sagittal planes and viewed independently as well as in a co-registered fashion.  FINDINGS: -------------- HEAD and NECK:   - Scintigraphic:  physiologic distribution of radiotracer   - Anatomic:  no discernable pathologic findings THORAX:   - Scintigraphic:   Right lower lobe 1.7 cm groundglass nodular opacity which has diminished FDG activity with SUV of 2.3. Otherwise physiologic distribution radiotracer.   - Anatomic: Please see above ABDOMEN:  - Scintigraphic:  physiologic distribution of radiotracer   - Anatomic:  no discernable pathologic findings PELVIS:  - Scintigraphic:  physiologic distribution of radiotracer   - Anatomic:  no discernable pathologic findings OSSEOUS / MISC:  - Scintigraphic:  physiologic distribution of radiotracer  - Anatomic:  no discernable pathologic findings     Impression: CONCLUSION: 1.  Right lower lobe 1.7 cm groundglass nodular opacity which has diminished FDG activity with SUV of 2.3. This lesion is most consistent with inflammatory changes with neoplastic involvement not favored. However, recommend follow-up CT in 6 months to further evaluate. 2. Otherwise negative PET scan. (Please note:  a Nuclear Medicine bone scan is considered more sensitive for the detection of osteoblastic metastatic disease). Electronically signed by:  Juanpablo Mansfield MD  3/27/2020 1:37 PM CDT Workstation: [id number]    EKG April 15, 2020 at 0021 reveals sinus bradycardia rate of 59 bpm.  No ST elevation depression.  No T wave flattening or inversion.  No evidence of acute ischemia.      HEART Score (for prediction of 6-week risk of major adverse cardiac event) reviewed and/or performed as part of the patient evaluation and treatment planning process.  The result associated with this review/performance is: 2       MDM  Number of Diagnoses or Management Options     Amount and/or Complexity of Data Reviewed  Clinical lab tests: reviewed  Tests in the radiology section of CPT®: reviewed    Patient Progress  Patient progress: stable    Patient with hypertension given clonidine.  Influenza and strep negative.  Chest x-ray pending but appears clear.  Remainder of laboratory studies pending.  At the end of my shift patient is awaiting complete evaluation.  Patient signed  out to Dr. Beckman.  Please see his documentation for final disposition.    Final diagnoses:   Hypertension, unspecified type   Shortness of breath   Cough   Pneumonia of right middle lobe due to infectious organism (CMS/[facility])            Mohsen Beckman MD  04/15/20 0224       Mohsen Beckman MD  04/15/20 0227

## 2020-04-16 ENCOUNTER — EPISODE CHANGES (OUTPATIENT)
Dept: CASE MANAGEMENT | Facility: OTHER | Age: 65
End: 2020-04-16

## 2020-04-16 LAB
REF LAB TEST METHOD: NORMAL
SARS-COV-2 RNA RESP QL NAA+PROBE: NOT DETECTED

## 2020-04-17 ENCOUNTER — TELEPHONE (OUTPATIENT)
Dept: EMERGENCY DEPT | Facility: HOSPITAL | Age: 65
End: 2020-04-17

## 2020-04-17 LAB — BACTERIA SPEC AEROBE CULT: NORMAL

## 2020-04-23 ENCOUNTER — EPISODE CHANGES (OUTPATIENT)
Dept: CASE MANAGEMENT | Facility: OTHER | Age: 65
End: 2020-04-23

## 2020-10-21 ENCOUNTER — OFFICE VISIT (OUTPATIENT)
Dept: OBSTETRICS AND GYNECOLOGY | Facility: CLINIC | Age: 65
End: 2020-10-21

## 2020-10-21 VITALS
WEIGHT: 186 LBS | BODY MASS INDEX: 29.89 KG/M2 | DIASTOLIC BLOOD PRESSURE: 74 MMHG | SYSTOLIC BLOOD PRESSURE: 126 MMHG | HEIGHT: 66 IN

## 2020-10-21 DIAGNOSIS — N81.4 UTERINE PROLAPSE: Primary | ICD-10-CM

## 2020-10-21 DIAGNOSIS — N39.3 STRESS INCONTINENCE OF URINE: ICD-10-CM

## 2020-10-21 DIAGNOSIS — Z85.3 HX: BREAST CANCER: ICD-10-CM

## 2020-10-21 PROBLEM — Z12.4 CERVICAL CANCER SCREENING: Status: RESOLVED | Noted: 2019-10-18 | Resolved: 2020-10-21

## 2020-10-21 PROBLEM — Z90.49 STATUS POST LAPAROSCOPIC CHOLECYSTECTOMY: Status: RESOLVED | Noted: 2018-09-20 | Resolved: 2020-10-21

## 2020-10-21 PROBLEM — Z13.820 ENCOUNTER FOR SCREENING FOR OSTEOPOROSIS: Status: RESOLVED | Noted: 2017-09-22 | Resolved: 2020-10-21

## 2020-10-21 PROBLEM — R13.10 DYSPHAGIA: Status: RESOLVED | Noted: 2019-07-03 | Resolved: 2020-10-21

## 2020-10-21 PROBLEM — R10.2 PELVIC PAIN: Status: RESOLVED | Noted: 2019-10-18 | Resolved: 2020-10-21

## 2020-10-21 PROBLEM — N39.0 URINARY TRACT INFECTION: Status: RESOLVED | Noted: 2017-01-09 | Resolved: 2020-10-21

## 2020-10-21 PROCEDURE — 99214 OFFICE O/P EST MOD 30 MIN: CPT | Performed by: OBSTETRICS & GYNECOLOGY

## 2020-10-21 RX ORDER — ESCITALOPRAM OXALATE 5 MG/1
5 TABLET ORAL DAILY
COMMUNITY
Start: 2020-09-24 | End: 2021-03-12 | Stop reason: ALTCHOICE

## 2020-10-21 NOTE — PROGRESS NOTES
[facility]  Gynecology Consult  Date of Service: 10/21/2020  Referring provider: Srikanth Girard MD    CC: Prolapse    HPI  Humera Swartz is a 65 y.o.  postmenopausal female who presents as a referral from Dr. Girard secondary to uterine prolapse.    She states that for some time she has been feeling a bulge.  She states that when she is standing or lifting, it comes out.  She states that she just got  and this has made intercourse uncomfortable for her.  She does not need to splint to have BM or void but states that it is harder.  She also reports ERICA.    She has a history of stage III R breast cancer diagnosed in .  She is status-post mastectomy followed by chemotherapy and radiation.  She is s/p Arimidex therapy.    ROS  Review of Systems   Constitutional: Negative.    HENT: Negative.    Eyes: Negative.    Respiratory: Negative.    Cardiovascular: Negative.    Gastrointestinal: Negative.    Endocrine: Negative.    Genitourinary: Negative.  Negative for vaginal bleeding.   Musculoskeletal: Negative.    Skin: Negative.    Neurological: Negative.    Hematological: Negative.    Psychiatric/Behavioral: Negative.      GYN HISTORY  Menarche: age 12  Menopause: age 53  History of STIs: None per patietn  Last pap smear:   Last Completed Pap Smear       Status Date      PAP SMEAR Done 10/17/2019 LIQUID-BASED PAP SMEAR, SCREENING     Patient has more history with this topic...      Abnormal pap smear history: None per patient  Contraception: N/A     OB HISTORY  OB History    Para Term  AB Living   3 2 2   1 2   SAB TAB Ectopic Molar Multiple Live Births   1                # Outcome Date GA Lbr Enio/2nd Weight Sex Delivery Anes PTL Lv   3 SAB            2 Term            1 Term              PAST MEDICAL HISTORY  Past Medical History:   Diagnosis Date   • Acquired equinus deformity of foot     ANKLE   • Anxiety    • Breast cancer (CMS/HCC)    • Depression    • Diverticular  disease of colon    • Esophagitis    • GERD (gastroesophageal reflux disease)    • Hx of radiation therapy    • Hyperlipidemia    • Plantar fasciitis    • Primary fibromyalgia syndrome    • Solitary pulmonary nodule present on computed tomography of lung      PAST SURGICAL HISTORY  Past Surgical History:   Procedure Laterality Date   • BREAST SURGERY      Carcinoma of the right breast;Mastectomy   •  SECTION     • CHOLECYSTECTOMY WITH INTRAOPERATIVE CHOLANGIOGRAM N/A 2018    Procedure: LAPAROSCOPIC POSSIBLE OPEN CHOLECYSTECTOMY WITH INTRAOPERATIVE CHOLANGIOGRAM    (C-Arm # 1);  Surgeon: Dirk Leigh MD;  Location: [facility] OR;  Service: General   • COLONOSCOPY  2016    Normal colon.No specimens collected   • DIAGNOSTIC LAPAROSCOPY  1977    (Amenorrhea, probable polycystic ovaries. Polycystic ovaries   • ENDOSCOPY N/A 2019    Procedure: ESOPHAGOGASTRODUODENOSCOPY possible dilation;  Surgeon: Brooks Reinoso MD;  Location: [facility] ENDOSCOPY;  Service: Gastroenterology   • ESOPHAGOSCOPY / EGD  2016    Mildly severe esophagitis.Gastritis.Normal examined duodenum.Medium sized hiatus hernia   • EXCISION BREAST LESION W/ PREOP NEEDLE LOC  1987    Bilateral excision of breast masses. Bilateral breast masses; probable fibrocystic disease.   • HYSTEROSCOPY  2011    Exam under anesthesia, diagnostic hysterectomy with fractional dilation and curettage. Thickened endometrial stripe, on Tamoxifen therapy.   • OTHER SURGICAL HISTORY  2016    NEEDLE BIOPSY LYMPH NODES; Ultrasound directed core needle biopsy of the left axilla.   • TUBAL ABDOMINAL LIGATION       FAMILY HISTORY  Family History   Problem Relation Age of Onset   • Diabetes Other    • Arthritis Other    • Breast cancer Maternal Aunt      SOCIAL HISTORY  Social History     Socioeconomic History   • Marital status: Single     Spouse name: Not on file   • Number of children: Not on file   • Years of education: Not on  file   • Highest education level: Not on file   Tobacco Use   • Smoking status: Former Smoker   • Smokeless tobacco: Never Used   • Tobacco comment: Ceased Smoking 12 Years Prior   Substance and Sexual Activity   • Alcohol use: No   • Drug use: No   • Sexual activity: Defer     ALLERGIES  Allergies   Allergen Reactions   • Erythromycin Other (See Comments)     SEVERE WEAKNESS   • Phenergan [Promethazine Hcl] Other (See Comments)     weakness   • Propofol Other (See Comments)     COUGH/WHEEZE  Runny nose   • Tetracyclines & Related GI Intolerance     HOME MEDICATIONS  Prior to Admission medications    Medication Sig Start Date End Date Taking? Authorizing Provider   Cholecalciferol (VITAMIN D-3 PO) Take 1 tablet by mouth Daily.   Yes Daniele Russo MD   DEXILANT 60 MG capsule TAKE 1 CAPSULE BY MOUTH EVERY DAY 3/17/20  Yes Dorita Spann APRN   escitalopram (LEXAPRO) 5 MG tablet Take 5 mg by mouth Daily. 9/24/20  Yes Daniele Russo MD   fluticasone (FLONASE) 50 MCG/ACT nasal spray 2 sprays into the nostril(s) as directed by provider Daily As Needed for Rhinitis.   Yes Daniele Russo MD   nitrofurantoin (MACRODANTIN) 100 MG capsule Take 100 mg by mouth Daily.   Yes Daniele Russo MD   Probiotic Product (PROBIOTIC DAILY PO) Take 1 capsule by mouth Daily.   Yes Daniele Russo MD   B Complex-C-E-Zn (B COMPLEX-C-E-ZINC) tablet Take 1 tablet by mouth Daily.  10/21/20 Yes Daniele Russo MD   busPIRone (BUSPAR) 5 MG tablet Take 2.5 mg by mouth Every Night.  10/21/20  Daniele Russo MD   CBD (cannabidiol) oral oil Take  by mouth Daily.  10/21/20  Daniele Russo MD   FLUoxetine (PROzac) 40 MG capsule Take 40 mg by mouth Daily. 11/4/19 10/21/20  Daniele Russo MD   levoFLOXacin (LEVAQUIN) 750 MG tablet Take 1 tablet by mouth Daily. 4/15/20 10/21/20  Mohsen Beckman MD   Prenatal Vit-Fe Fumarate-FA (PRENATAL PO) Take 1 tablet by mouth Daily.  10/21/20  Rafaela  "MD Daniele   sertraline (ZOLOFT) 50 MG tablet Take 50 mg by mouth Every Night.  10/21/20  Provider, MD RITA Sanabria  /74   Ht 167.6 cm (66\")   Wt 84.4 kg (186 lb)   BMI 30.02 kg/m²        General: Alert, healthy, no distress, well nourished and well developed.  Neurologic: Alert, oriented to person, place, and time.  Gait normal.  Cranial nerves II-XII grossly intact.  HEENT: Normocephalic, atraumatic.  Extraocular muscles intact, pupils equal and reactive times two.    Neck: Supple, no adenopathy, thyroid normal size, non-tender, without nodularity, trachea midline.  Lungs: Normal respiratory effort.  Clear to auscultation bilaterally.  No wheezes, rhonci, or rales.  Heart: Regular rate and rhythm.  No murmer, rub or gallop.  Abdomen: Soft, non-tender, non-distended,no masses, no hepatosplenomegaly, no hernia.  Skin: No rash, no lesions.  Extremities: No cyanosis, clubbing or edema.  PELVIC EXAM:  External Genitalia/Vulva: Anatomy is normal, no significant redness of labia, no discharge on vulvar tissues, Delmont's and Bartholin's glands are normal, no ulcers, no condylomatous lesions.  Urethral meatus: Normal, no lesions, no prolapse.  Urethra: Normal, no masses, no tenderness with palpation.  Bladder: Normal, no fullness, no masses, no tenderness with palpation.  Vagina: Vaginal tissues are not inflamed, normal color and texture, no significant discharge present.  Decently estrogenized.  Cervix: Normal, no lesions, no purulent discharge, no cervical motion tenderness.  Uterus: Normal size, shape, and consistency.  Good mobility noted.  Minimal descent noted with good support.  Adnexa: Normal size and shape bilaterally, no palpable mass bilaterally and non-tender bilaterally.  Rectal: Normal, no masses or polyps, confirms bimanual exam, perianal normal, no lesions; SANDEE deferred.    Aa        -3 Ba      -3 C       +3   GH      3  PB      2 TVL      6   Ap      -2  Bp      -2 D      -4   Negative " cough stress test    IMPRESSION  Humera Swartz is a 65 y.o.  presenting with uterine prolapse with apical descent.  I discussed with the patient that given the apical descent in addition to the prolapse, I felt that she would benefit from both a hysterectomy and a uterosacral suspension.  However, I do not perform the suspension procedures and neither does Dr. Girard.  I discussed that I would recommend referral to Urogynecology and she is agreeable.  She prefers to be close to home, so will send to Dr. Emery at [facility].  Discussed that if he feels a hysterectomy is indicated, recommend discussing removal of ovaries given her history of breast cancer and age.    PLAN    1. Uterine prolapse    2. Stress incontinence of urine    3. HX: breast cancer         Thank you for allowing me to participate in the care of this patient.  Please contact me with any questions or concerns.    This document has been electronically signed by Crystal Dougherty MD on 2020 13:17 CDT.    CC: Srikanth Girard MD

## 2021-02-19 ENCOUNTER — OFFICE VISIT (OUTPATIENT)
Dept: ONCOLOGY | Facility: CLINIC | Age: 66
End: 2021-02-19

## 2021-02-19 ENCOUNTER — LAB (OUTPATIENT)
Dept: ONCOLOGY | Facility: HOSPITAL | Age: 66
End: 2021-02-19

## 2021-02-19 ENCOUNTER — TELEPHONE (OUTPATIENT)
Dept: OBSTETRICS AND GYNECOLOGY | Facility: CLINIC | Age: 66
End: 2021-02-19

## 2021-02-19 VITALS
HEART RATE: 88 BPM | BODY MASS INDEX: 30.62 KG/M2 | HEIGHT: 66 IN | SYSTOLIC BLOOD PRESSURE: 167 MMHG | RESPIRATION RATE: 18 BRPM | WEIGHT: 190.5 LBS | TEMPERATURE: 98.5 F | DIASTOLIC BLOOD PRESSURE: 82 MMHG

## 2021-02-19 DIAGNOSIS — Z12.31 ENCOUNTER FOR SCREENING MAMMOGRAM FOR MALIGNANT NEOPLASM OF BREAST: ICD-10-CM

## 2021-02-19 DIAGNOSIS — Z85.3 HX: BREAST CANCER: Primary | ICD-10-CM

## 2021-02-19 DIAGNOSIS — R91.1 SOLITARY LUNG NODULE: ICD-10-CM

## 2021-02-19 LAB
ALBUMIN SERPL-MCNC: 4.2 G/DL (ref 3.5–5.2)
ALBUMIN/GLOB SERPL: 1.4 G/DL
ALP SERPL-CCNC: 119 U/L (ref 39–117)
ALT SERPL W P-5'-P-CCNC: 17 U/L (ref 1–33)
ANION GAP SERPL CALCULATED.3IONS-SCNC: 10 MMOL/L (ref 5–15)
AST SERPL-CCNC: 22 U/L (ref 1–32)
BASOPHILS # BLD AUTO: 0.03 10*3/MM3 (ref 0–0.2)
BASOPHILS NFR BLD AUTO: 0.4 % (ref 0–1.5)
BILIRUB SERPL-MCNC: 0.7 MG/DL (ref 0–1.2)
BUN SERPL-MCNC: 13 MG/DL (ref 8–23)
BUN/CREAT SERPL: 16.3 (ref 7–25)
CALCIUM SPEC-SCNC: 9.5 MG/DL (ref 8.6–10.5)
CHLORIDE SERPL-SCNC: 103 MMOL/L (ref 98–107)
CO2 SERPL-SCNC: 28 MMOL/L (ref 22–29)
CREAT SERPL-MCNC: 0.8 MG/DL (ref 0.57–1)
DEPRECATED RDW RBC AUTO: 42 FL (ref 37–54)
EOSINOPHIL # BLD AUTO: 0.18 10*3/MM3 (ref 0–0.4)
EOSINOPHIL NFR BLD AUTO: 2.5 % (ref 0.3–6.2)
ERYTHROCYTE [DISTWIDTH] IN BLOOD BY AUTOMATED COUNT: 12.9 % (ref 12.3–15.4)
GFR SERPL CREATININE-BSD FRML MDRD: 72 ML/MIN/1.73
GLOBULIN UR ELPH-MCNC: 3.1 GM/DL
GLUCOSE SERPL-MCNC: 146 MG/DL (ref 65–99)
HCT VFR BLD AUTO: 41 % (ref 34–46.6)
HGB BLD-MCNC: 14.1 G/DL (ref 12–15.9)
IMM GRANULOCYTES # BLD AUTO: 0.02 10*3/MM3 (ref 0–0.05)
IMM GRANULOCYTES NFR BLD AUTO: 0.3 % (ref 0–0.5)
LYMPHOCYTES # BLD AUTO: 2.36 10*3/MM3 (ref 0.7–3.1)
LYMPHOCYTES NFR BLD AUTO: 33.4 % (ref 19.6–45.3)
MCH RBC QN AUTO: 30.5 PG (ref 26.6–33)
MCHC RBC AUTO-ENTMCNC: 34.4 G/DL (ref 31.5–35.7)
MCV RBC AUTO: 88.7 FL (ref 79–97)
MONOCYTES # BLD AUTO: 0.4 10*3/MM3 (ref 0.1–0.9)
MONOCYTES NFR BLD AUTO: 5.7 % (ref 5–12)
NEUTROPHILS NFR BLD AUTO: 4.07 10*3/MM3 (ref 1.7–7)
NEUTROPHILS NFR BLD AUTO: 57.7 % (ref 42.7–76)
NRBC BLD AUTO-RTO: 0 /100 WBC (ref 0–0.2)
PLATELET # BLD AUTO: 248 10*3/MM3 (ref 140–450)
PMV BLD AUTO: 10.1 FL (ref 6–12)
POTASSIUM SERPL-SCNC: 3.8 MMOL/L (ref 3.5–5.2)
PROT SERPL-MCNC: 7.3 G/DL (ref 6–8.5)
RBC # BLD AUTO: 4.62 10*6/MM3 (ref 3.77–5.28)
SODIUM SERPL-SCNC: 141 MMOL/L (ref 136–145)
WBC # BLD AUTO: 7.06 10*3/MM3 (ref 3.4–10.8)

## 2021-02-19 PROCEDURE — 80053 COMPREHEN METABOLIC PANEL: CPT

## 2021-02-19 PROCEDURE — 99213 OFFICE O/P EST LOW 20 MIN: CPT | Performed by: NURSE PRACTITIONER

## 2021-02-19 PROCEDURE — 85025 COMPLETE CBC W/AUTO DIFF WBC: CPT

## 2021-02-19 PROCEDURE — G0463 HOSPITAL OUTPT CLINIC VISIT: HCPCS | Performed by: NURSE PRACTITIONER

## 2021-02-19 NOTE — TELEPHONE ENCOUNTER
PT IS REQUESTING THAT YOU CALL HER REGARDING A REFERRAL THAT SHE WAS EXPECTING LAST YEAR TO A SPECIALIST FOR GYNO SURGERY.  HER NUMBER [phone].

## 2021-02-22 ENCOUNTER — TELEPHONE (OUTPATIENT)
Dept: OBSTETRICS AND GYNECOLOGY | Facility: CLINIC | Age: 66
End: 2021-02-22

## 2021-02-22 NOTE — TELEPHONE ENCOUNTER
Pt returned your call. She wants a referral for  at [facility]. She said that's who  suggested.

## 2021-02-23 ENCOUNTER — HOSPITAL ENCOUNTER (OUTPATIENT)
Dept: CT IMAGING | Facility: HOSPITAL | Age: 66
Discharge: HOME | End: 2021-02-23
Admitting: NURSE PRACTITIONER

## 2021-02-23 DIAGNOSIS — Z85.3 HX: BREAST CANCER: ICD-10-CM

## 2021-02-23 PROCEDURE — 71260 CT THORAX DX C+: CPT

## 2021-02-23 PROCEDURE — 25010000002 IOPAMIDOL 61 % SOLUTION: Performed by: NURSE PRACTITIONER

## 2021-02-23 RX ADMIN — IOPAMIDOL 90 ML: 612 INJECTION, SOLUTION INTRAVENOUS at 13:16

## 2021-02-24 ENCOUNTER — IMMUNIZATION (OUTPATIENT)
Dept: VACCINE CLINIC | Facility: HOSPITAL | Age: 66
End: 2021-02-24

## 2021-02-24 ENCOUNTER — TELEPHONE (OUTPATIENT)
Dept: OBSTETRICS AND GYNECOLOGY | Facility: CLINIC | Age: 66
End: 2021-02-24

## 2021-02-24 ENCOUNTER — OFFICE VISIT (OUTPATIENT)
Dept: GASTROENTEROLOGY | Facility: CLINIC | Age: 66
End: 2021-02-24

## 2021-02-24 VITALS
HEIGHT: 66 IN | DIASTOLIC BLOOD PRESSURE: 81 MMHG | HEART RATE: 92 BPM | BODY MASS INDEX: 30.76 KG/M2 | SYSTOLIC BLOOD PRESSURE: 149 MMHG | WEIGHT: 191.4 LBS

## 2021-02-24 DIAGNOSIS — R19.7 DIARRHEA, UNSPECIFIED TYPE: ICD-10-CM

## 2021-02-24 DIAGNOSIS — R19.4 CHANGE IN BOWEL HABITS: Primary | ICD-10-CM

## 2021-02-24 DIAGNOSIS — R10.84 GENERALIZED ABDOMINAL PAIN: ICD-10-CM

## 2021-02-24 PROCEDURE — 91300 HC SARSCOV02 VAC 30MCG/0.3ML IM: CPT | Performed by: THORACIC SURGERY (CARDIOTHORACIC VASCULAR SURGERY)

## 2021-02-24 PROCEDURE — 0001A: CPT | Performed by: THORACIC SURGERY (CARDIOTHORACIC VASCULAR SURGERY)

## 2021-02-24 PROCEDURE — 99214 OFFICE O/P EST MOD 30 MIN: CPT | Performed by: NURSE PRACTITIONER

## 2021-02-24 RX ORDER — SODIUM, POTASSIUM,MAG SULFATES 17.5-3.13G
1 SOLUTION, RECONSTITUTED, ORAL ORAL EVERY 12 HOURS
Qty: 177 ML | Refills: 0 | Status: SHIPPED | OUTPATIENT
Start: 2021-02-24 | End: 2021-03-19 | Stop reason: HOSPADM

## 2021-02-24 RX ORDER — DICYCLOMINE HYDROCHLORIDE 10 MG/1
10 CAPSULE ORAL 3 TIMES DAILY PRN
COMMUNITY
Start: 2021-02-04 | End: 2021-02-24 | Stop reason: SDUPTHER

## 2021-02-24 RX ORDER — KETOCONAZOLE 20 MG/ML
SHAMPOO TOPICAL
COMMUNITY
Start: 2021-01-30 | End: 2022-03-22

## 2021-02-24 RX ORDER — ESCITALOPRAM OXALATE 10 MG/1
10 TABLET ORAL DAILY
COMMUNITY
Start: 2021-01-26 | End: 2021-04-29 | Stop reason: SDUPTHER

## 2021-02-24 RX ORDER — DEXTROSE AND SODIUM CHLORIDE 5; .45 G/100ML; G/100ML
30 INJECTION, SOLUTION INTRAVENOUS CONTINUOUS PRN
Status: CANCELLED | OUTPATIENT
Start: 2021-03-19

## 2021-02-24 RX ORDER — TRIAMCINOLONE ACETONIDE 1 MG/G
CREAM TOPICAL
COMMUNITY
Start: 2020-12-31 | End: 2022-03-22

## 2021-02-24 RX ORDER — DICYCLOMINE HYDROCHLORIDE 10 MG/1
10 CAPSULE ORAL 3 TIMES DAILY PRN
Qty: 90 CAPSULE | Refills: 1 | Status: SHIPPED | OUTPATIENT
Start: 2021-02-24 | End: 2021-03-24

## 2021-02-24 NOTE — PROGRESS NOTES
Chief Complaint   Patient presents with   • Diarrhea       Subjective    Humera Swartz is a 66 y.o. female. she is here today for follow-up.    66-year-old female presents to discuss diarrhea states is been ongoing for the last year and a half but recently worsened now she has urgency and episodes of incontinence if she cannot get to bathroom very quickly.  States on bad days she has up to 13 bowel movements a day but most days states more of an average of 4-8.  She was previously seen here 2019 for dysphagia and reflux which responded well to dilation and Dexilant.  States she still has reflux but denies any dysphagia.  [facility] was [facility] and given Bentyl but has not started it yet.    Diarrhea   This is a chronic problem. The current episode started more than 1 year ago (about 18 months ago ). The problem occurs more than 10 times per day. The problem has been rapidly worsening. The stool consistency is described as watery. The patient states that diarrhea awakens her from sleep. Associated symptoms include abdominal pain, bloating and increased flatus. Pertinent negatives include no arthralgias, chills, coughing, fever, headaches, myalgias, sweats, URI, vomiting or weight loss. Exacerbated by: any intake  She has tried increased fluids and change of diet for the symptoms.     Plan; schedule patient for EGD and colonoscopy due to change in bowel habits diarrhea generalized abdominal pain and chronic reflux.       The following portions of the patient's history were reviewed and updated as appropriate:   Past Medical History:   Diagnosis Date   • Acquired equinus deformity of foot     ANKLE   • Anxiety    • Breast cancer (CMS/HCC)    • Depression    • Diverticular disease of colon    • Esophagitis    • GERD (gastroesophageal reflux disease)    • Hx of radiation therapy    • Hyperlipidemia    • Plantar fasciitis    • Primary fibromyalgia syndrome    • Solitary pulmonary nodule present on computed  tomography of lung      Past Surgical History:   Procedure Laterality Date   • BREAST SURGERY      Carcinoma of the right breast;Mastectomy   •  SECTION     • CHOLECYSTECTOMY WITH INTRAOPERATIVE CHOLANGIOGRAM N/A 2018    Procedure: LAPAROSCOPIC POSSIBLE OPEN CHOLECYSTECTOMY WITH INTRAOPERATIVE CHOLANGIOGRAM    (C-Arm # 1);  Surgeon: Dirk Leigh MD;  Location: [facility] OR;  Service: General   • COLONOSCOPY  2016    Normal colon.No specimens collected   • DIAGNOSTIC LAPAROSCOPY  1977    (Amenorrhea, probable polycystic ovaries. Polycystic ovaries   • ENDOSCOPY N/A 2019    Procedure: ESOPHAGOGASTRODUODENOSCOPY possible dilation;  Surgeon: Brooks Reinoso MD;  Location: [facility] ENDOSCOPY;  Service: Gastroenterology   • ESOPHAGOSCOPY / EGD  2016    Mildly severe esophagitis.Gastritis.Normal examined duodenum.Medium sized hiatus hernia   • EXCISION BREAST LESION W/ PREOP NEEDLE LOC  1987    Bilateral excision of breast masses. Bilateral breast masses; probable fibrocystic disease.   • HYSTEROSCOPY  2011    Exam under anesthesia, diagnostic hysterectomy with fractional dilation and curettage. Thickened endometrial stripe, on Tamoxifen therapy.   • OTHER SURGICAL HISTORY  2016    NEEDLE BIOPSY LYMPH NODES; Ultrasound directed core needle biopsy of the left axilla.   • TUBAL ABDOMINAL LIGATION       Family History   Problem Relation Age of Onset   • Diabetes Other    • Arthritis Other    • Breast cancer Maternal Aunt      OB History        3    Para   2    Term   2            AB   1    Living   2       SAB   1    TAB        Ectopic        Molar        Multiple        Live Births                  Prior to Admission medications    Medication Sig Start Date End Date Taking? Authorizing Provider   DEXILANT 60 MG capsule TAKE 1 CAPSULE BY MOUTH EVERY DAY 3/17/20  Yes Dorita Spann APRN   escitalopram (LEXAPRO) 5 MG tablet Take 5 mg by mouth Daily. 20  Yes  Daniele Russo MD   fluticasone (FLONASE) 50 MCG/ACT nasal spray 2 sprays into the nostril(s) as directed by provider Daily As Needed for Rhinitis.   Yes Daniele Russo MD   nitrofurantoin (MACRODANTIN) 100 MG capsule Take 100 mg by mouth Daily.   Yes Daniele Russo MD   Cholecalciferol (VITAMIN D-3 PO) Take 1 tablet by mouth Daily.    Daniele Russo MD   dicyclomine (BENTYL) 10 MG capsule Take 10 mg by mouth 3 (Three) Times a Day As Needed. 2/4/21   Daniele Russo MD   escitalopram (LEXAPRO) 10 MG tablet Take 10 mg by mouth Daily. 1/26/21   Daniele Russo MD   ketoconazole (NIZORAL) 2 % shampoo APPLY TO SCALP AND LATHER. WAIT 10 MINUTES BEFORE RINSING. REPEAT 2 3 TIMES PER WEEK 1/30/21   Daniele Russo MD   Probiotic Product (PROBIOTIC DAILY PO) Take 1 capsule by mouth Daily.    Daniele Russo MD   triamcinolone (KENALOG) 0.1 % cream APPLY A THIN LAYER TO THE AFFECTED AREA(S) ON HANDS BY TOPICAL ROUTE 2 TIMES PER DAY FOR ONE WEEK 12/31/20   Daniele Russo MD     Allergies   Allergen Reactions   • Erythromycin Other (See Comments)     SEVERE WEAKNESS   • Phenergan [Promethazine Hcl] Other (See Comments)     weakness   • Propofol Other (See Comments)     COUGH/WHEEZE  Runny nose   • Tetracyclines & Related GI Intolerance     Social History     Socioeconomic History   • Marital status:      Spouse name: Not on file   • Number of children: Not on file   • Years of education: Not on file   • Highest education level: Not on file   Tobacco Use   • Smoking status: Former Smoker   • Smokeless tobacco: Never Used   • Tobacco comment: Ceased Smoking 12 Years Prior   Substance and Sexual Activity   • Alcohol use: No   • Drug use: No   • Sexual activity: Defer       Review of Systems  Review of Systems   Constitutional: Negative for chills, fever and weight loss.   Respiratory: Negative for cough.    Gastrointestinal: Positive for abdominal pain, bloating,  "diarrhea and flatus. Negative for vomiting.   Musculoskeletal: Negative for arthralgias and myalgias.   Neurological: Negative for headaches.        /81 (BP Location: Left arm)   Pulse 92   Ht 167.6 cm (65.98\")   Wt 86.8 kg (191 lb 6.4 oz)   BMI 30.91 kg/m²     Objective    Physical Exam  Constitutional:       General: She is not in acute distress.     Appearance: Normal appearance. She is well-developed.   HENT:      Head: Normocephalic and atraumatic.   Neck:      Musculoskeletal: Normal range of motion and neck supple.      Thyroid: No thyromegaly.   Cardiovascular:      Rate and Rhythm: Normal rate and regular rhythm.      Heart sounds: Normal heart sounds.   Pulmonary:      Effort: Pulmonary effort is normal.      Breath sounds: Normal breath sounds. No wheezing, rhonchi or rales.   Abdominal:      General: Bowel sounds are normal. There is no distension.      Palpations: Abdomen is soft. Abdomen is not rigid.      Tenderness: There is generalized abdominal tenderness. There is no guarding.      Hernia: No hernia is present.   Lymphadenopathy:      Cervical: No cervical adenopathy.   Skin:     General: Skin is warm and dry.      Coloration: Skin is not pale.      Findings: No rash.   Neurological:      Mental Status: She is alert and oriented to person, place, and time.   Psychiatric:         Speech: Speech normal.         Behavior: Behavior is cooperative.       Lab on 02/19/2021   Component Date Value Ref Range Status   • Glucose 02/19/2021 146* 65 - 99 mg/dL Final   • BUN 02/19/2021 13  8 - 23 mg/dL Final   • Creatinine 02/19/2021 0.80  0.57 - 1.00 mg/dL Final   • Sodium 02/19/2021 141  136 - 145 mmol/L Final   • Potassium 02/19/2021 3.8  3.5 - 5.2 mmol/L Final   • Chloride 02/19/2021 103  98 - 107 mmol/L Final   • CO2 02/19/2021 28.0  22.0 - 29.0 mmol/L Final   • Calcium 02/19/2021 9.5  8.6 - 10.5 mg/dL Final   • Total Protein 02/19/2021 7.3  6.0 - 8.5 g/dL Final   • Albumin 02/19/2021 4.20  3.50 " - 5.20 g/dL Final   • ALT (SGPT) 02/19/2021 17  1 - 33 U/L Final   • AST (SGOT) 02/19/2021 22  1 - 32 U/L Final   • Alkaline Phosphatase 02/19/2021 119* 39 - 117 U/L Final   • Total Bilirubin 02/19/2021 0.7  0.0 - 1.2 mg/dL Final   • eGFR Non  Amer 02/19/2021 72  >60 mL/min/1.73 Final   • Globulin 02/19/2021 3.1  gm/dL Final   • A/G Ratio 02/19/2021 1.4  g/dL Final   • BUN/Creatinine Ratio 02/19/2021 16.3  7.0 - 25.0 Final   • Anion Gap 02/19/2021 10.0  5.0 - 15.0 mmol/L Final   • WBC 02/19/2021 7.06  3.40 - 10.80 10*3/mm3 Final   • RBC 02/19/2021 4.62  3.77 - 5.28 10*6/mm3 Final   • Hemoglobin 02/19/2021 14.1  12.0 - 15.9 g/dL Final   • Hematocrit 02/19/2021 41.0  34.0 - 46.6 % Final   • MCV 02/19/2021 88.7  79.0 - 97.0 fL Final   • MCH 02/19/2021 30.5  26.6 - 33.0 pg Final   • MCHC 02/19/2021 34.4  31.5 - 35.7 g/dL Final   • RDW 02/19/2021 12.9  12.3 - 15.4 % Final   • RDW-SD 02/19/2021 42.0  37.0 - 54.0 fl Final   • MPV 02/19/2021 10.1  6.0 - 12.0 fL Final   • Platelets 02/19/2021 248  140 - 450 10*3/mm3 Final   • Neutrophil % 02/19/2021 57.7  42.7 - 76.0 % Final   • Lymphocyte % 02/19/2021 33.4  19.6 - 45.3 % Final   • Monocyte % 02/19/2021 5.7  5.0 - 12.0 % Final   • Eosinophil % 02/19/2021 2.5  0.3 - 6.2 % Final   • Basophil % 02/19/2021 0.4  0.0 - 1.5 % Final   • Immature Grans % 02/19/2021 0.3  0.0 - 0.5 % Final   • Neutrophils, Absolute 02/19/2021 4.07  1.70 - 7.00 10*3/mm3 Final   • Lymphocytes, Absolute 02/19/2021 2.36  0.70 - 3.10 10*3/mm3 Final   • Monocytes, Absolute 02/19/2021 0.40  0.10 - 0.90 10*3/mm3 Final   • Eosinophils, Absolute 02/19/2021 0.18  0.00 - 0.40 10*3/mm3 Final   • Basophils, Absolute 02/19/2021 0.03  0.00 - 0.20 10*3/mm3 Final   • Immature Grans, Absolute 02/19/2021 0.02  0.00 - 0.05 10*3/mm3 Final   • nRBC 02/19/2021 0.0  0.0 - 0.2 /100 WBC Final     Assessment/Plan      1. Change in bowel habits    2. Diarrhea, unspecified type    3. Generalized abdominal pain    .        Orders placed during this encounter include:  Orders Placed This Encounter   Procedures   • Ambulatory Referral to Family Practice     Referral Priority:   Routine     Referral Type:   Consultation     Referral Reason:   Specialty Services Required     Referred to Provider:   Adrienne Munoz MD     Requested Specialty:   Family Medicine     Number of Visits Requested:   1   • Follow Anesthesia Guidelines / Standing Orders     Standing Status:   Future   • Obtain Informed Consent     Standing Status:   Future     Order Specific Question:   Informed Consent Given For     Answer:   ESOPHAGOGASTRODUODENOSCOPY and Colonoscopy       ESOPHAGOGASTRODUODENOSCOPY (N/A), COLONOSCOPY (N/A)    Review and/or summary of lab tests, radiology, procedures, medications. Review and summary of old records and obtaining of history. The risks and benefits of my recommendations, as well as other treatment options were discussed with the patient today. Questions were answered.    New Medications Ordered This Visit   Medications   • sodium-potassium-magnesium sulfates (Suprep Bowel Prep Kit) 17.5-3.13-1.6 GM/177ML solution oral solution     Sig: Take 1 bottle by mouth Every 12 (Twelve) Hours.     Dispense:  177 mL     Refill:  0   • dicyclomine (BENTYL) 10 MG capsule     Sig: Take 1 capsule by mouth 3 (Three) Times a Day As Needed (cramping and diarrhea).     Dispense:  90 capsule     Refill:  1       Follow-up: Return in about 4 weeks (around 3/24/2021).          This document has been electronically signed by DAVID Ho on February 26, 2021 08:32 CST           I spent 26 minutes caring for Humera on this date of service. This time includes time spent by me in the following activities:preparing for the visit, reviewing tests, obtaining and/or reviewing a separately obtained history, performing a medically appropriate examination and/or evaluation , counseling and educating the patient/family/caregiver, ordering medications,  tests, or procedures, referring and communicating with other health care professionals , documenting information in the medical record and care coordination    Results for orders placed or performed in visit on 02/19/21   CBC Auto Differential    Specimen: Blood   Result Value Ref Range    WBC 7.06 3.40 - 10.80 10*3/mm3    RBC 4.62 3.77 - 5.28 10*6/mm3    Hemoglobin 14.1 12.0 - 15.9 g/dL    Hematocrit 41.0 34.0 - 46.6 %    MCV 88.7 79.0 - 97.0 fL    MCH 30.5 26.6 - 33.0 pg    MCHC 34.4 31.5 - 35.7 g/dL    RDW 12.9 12.3 - 15.4 %    RDW-SD 42.0 37.0 - 54.0 fl    MPV 10.1 6.0 - 12.0 fL    Platelets 248 140 - 450 10*3/mm3    Neutrophil % 57.7 42.7 - 76.0 %    Lymphocyte % 33.4 19.6 - 45.3 %    Monocyte % 5.7 5.0 - 12.0 %    Eosinophil % 2.5 0.3 - 6.2 %    Basophil % 0.4 0.0 - 1.5 %    Immature Grans % 0.3 0.0 - 0.5 %    Neutrophils, Absolute 4.07 1.70 - 7.00 10*3/mm3    Lymphocytes, Absolute 2.36 0.70 - 3.10 10*3/mm3    Monocytes, Absolute 0.40 0.10 - 0.90 10*3/mm3    Eosinophils, Absolute 0.18 0.00 - 0.40 10*3/mm3    Basophils, Absolute 0.03 0.00 - 0.20 10*3/mm3    Immature Grans, Absolute 0.02 0.00 - 0.05 10*3/mm3    nRBC 0.0 0.0 - 0.2 /100 WBC   Comprehensive Metabolic Panel    Specimen: Blood   Result Value Ref Range    Glucose 146 (H) 65 - 99 mg/dL    BUN 13 8 - 23 mg/dL    Creatinine 0.80 0.57 - 1.00 mg/dL    Sodium 141 136 - 145 mmol/L    Potassium 3.8 3.5 - 5.2 mmol/L    Chloride 103 98 - 107 mmol/L    CO2 28.0 22.0 - 29.0 mmol/L    Calcium 9.5 8.6 - 10.5 mg/dL    Total Protein 7.3 6.0 - 8.5 g/dL    Albumin 4.20 3.50 - 5.20 g/dL    ALT (SGPT) 17 1 - 33 U/L    AST (SGOT) 22 1 - 32 U/L    Alkaline Phosphatase 119 (H) 39 - 117 U/L    Total Bilirubin 0.7 0.0 - 1.2 mg/dL    eGFR Non African Amer 72 >60 mL/min/1.73    Globulin 3.1 gm/dL    A/G Ratio 1.4 g/dL    BUN/Creatinine Ratio 16.3 7.0 - 25.0    Anion Gap 10.0 5.0 - 15.0 mmol/L   Results for orders placed or performed during the hospital encounter of 04/14/20    Gold Top - SST   Result Value Ref Range    Extra Tube Hold for add-ons.    Green Top (Gel)   Result Value Ref Range    Extra Tube Hold for add-ons.    CORONAVIRUS (COVID-19),RT-PCR,[facility] LAB[facility], NP SWAB IN [facility]INE MEDIA - Swab, Nasopharynx    Specimen: Nasopharynx; Swab   Result Value Ref Range    Reference Lab Report      COVID19 Not Detected Not Detected   Urinalysis With Microscopic If Indicated (No Culture) - Urine, Clean Catch    Specimen: Urine, Clean Catch   Result Value Ref Range    Color, UA Yellow Yellow, Straw, Dark Yellow, Cathie    Appearance, UA Clear Clear    pH, UA 6.0 5.0 - 9.0    Specific Gravity, UA 1.007 1.003 - 1.030    Glucose, UA Negative Negative    Ketones, UA Negative Negative    Bilirubin, UA Negative Negative    Blood, UA Negative Negative    Protein, UA Negative Negative    Leuk Esterase, UA Negative Negative    Nitrite, UA Negative Negative    Urobilinogen, UA 0.2 E.U./dL 0.2 - 1.0 E.U./dL   CBC Auto Differential    Specimen: Blood   Result Value Ref Range    WBC 6.30 3.40 - 10.80 10*3/mm3    RBC 4.37 3.77 - 5.28 10*6/mm3    Hemoglobin 13.4 12.0 - 15.9 g/dL    Hematocrit 38.8 34.0 - 46.6 %    MCV 88.8 79.0 - 97.0 fL    MCH 30.7 26.6 - 33.0 pg    MCHC 34.5 31.5 - 35.7 g/dL    RDW 12.8 12.3 - 15.4 %    RDW-SD 41.7 37.0 - 54.0 fl    MPV 10.1 6.0 - 12.0 fL    Platelets 226 140 - 450 10*3/mm3    Neutrophil % 54.8 42.7 - 76.0 %    Lymphocyte % 35.2 19.6 - 45.3 %    Monocyte % 7.0 5.0 - 12.0 %    Eosinophil % 2.1 0.3 - 6.2 %    Basophil % 0.6 0.0 - 1.5 %    Immature Grans % 0.3 0.0 - 0.5 %    Neutrophils, Absolute 3.45 1.70 - 7.00 10*3/mm3    Lymphocytes, Absolute 2.22 0.70 - 3.10 10*3/mm3    Monocytes, Absolute 0.44 0.10 - 0.90 10*3/mm3    Eosinophils, Absolute 0.13 0.00 - 0.40 10*3/mm3    Basophils, Absolute 0.04 0.00 - 0.20 10*3/mm3    Immature Grans, Absolute 0.02 0.00 - 0.05 10*3/mm3    nRBC 0.0 0.0 - 0.2 /100 WBC   Lavender Top   Result Value Ref Range    Extra Tube hold for add-on     Light Blue Top   Result Value Ref Range    Extra Tube hold for add-on    Troponin    Specimen: Blood   Result Value Ref Range    Troponin T <0.010 0.000 - 0.030 ng/mL   Rapid Strep A Screen - Swab, Throat    Specimen: Throat; Swab   Result Value Ref Range    Strep A Ag Negative Negative   Influenza Antigen, Rapid - Swab, Nasopharynx    Specimen: Nasopharynx; Swab   Result Value Ref Range    Influenza A Ag, EIA Negative Negative    Influenza B Ag, EIA Negative Negative   Beta Strep Culture, Throat - Swab, Throat    Specimen: Throat; Swab   Result Value Ref Range    Throat Culture, Beta Strep No Beta Hemolytic Streptococcus Isolated    BNP    Specimen: Blood   Result Value Ref Range    proBNP 133.7 5.0 - 900.0 pg/mL   Comprehensive Metabolic Panel    Specimen: Blood   Result Value Ref Range    Glucose 124 (H) 65 - 99 mg/dL    BUN 13 8 - 23 mg/dL    Creatinine 0.74 0.57 - 1.00 mg/dL    Sodium 141 136 - 145 mmol/L    Potassium 3.8 3.5 - 5.2 mmol/L    Chloride 103 98 - 107 mmol/L    CO2 24.0 22.0 - 29.0 mmol/L    Calcium 9.7 8.6 - 10.5 mg/dL    Total Protein 7.0 6.0 - 8.5 g/dL    Albumin 4.30 3.50 - 5.20 g/dL    ALT (SGPT) 23 1 - 33 U/L    AST (SGOT) 22 1 - 32 U/L    Alkaline Phosphatase 107 39 - 117 U/L    Total Bilirubin 0.3 0.2 - 1.2 mg/dL    eGFR Non African Amer 79 >60 mL/min/1.73    Globulin 2.7 gm/dL    A/G Ratio 1.6 g/dL    BUN/Creatinine Ratio 17.6 7.0 - 25.0    Anion Gap 14.0 5.0 - 15.0 mmol/L     *Note: Due to a large number of results and/or encounters for the requested time period, some results have not been displayed. A complete set of results can be found in Results Review.

## 2021-02-24 NOTE — PATIENT INSTRUCTIONS
[place] Diet  A bland diet consists of foods that are often soft and do not have a lot of fat, fiber, or extra seasonings. Foods without fat, fiber, or seasoning are easier for the body to digest. They are also less likely to irritate your mouth, throat, stomach, and other parts of your digestive system. A bland diet is sometimes called a BRAT diet.  What is my plan?  Your health care provider or food and nutrition specialist (dietitian) may recommend specific changes to your diet to prevent symptoms or to treat your symptoms. These changes may include:  · Eating small meals often.  · Cooking food until it is soft enough to chew easily.  · Chewing your food well.  · Drinking fluids slowly.  · Not eating foods that are very spicy, sour, or fatty.  · Not eating citrus fruits, such as oranges and grapefruit.  What do I need to know about this diet?  · Eat a variety of foods from the bland diet food list.  · Do not follow a bland diet longer than needed.  · Ask your health care provider whether you should take vitamins or supplements.  What foods can I eat?  Grains    Hot cereals, such as cream of wheat. Rice. Bread, crackers, or tortillas made from refined white flour.  Vegetables  Canned or cooked vegetables. Mashed or boiled potatoes.  Fruits    Bananas. Applesauce. Other types of cooked or canned fruit with the skin and seeds removed, such as canned peaches or pears.  Meats and other proteins    Scrambled eggs. Creamy peanut butter or other nut butters. Lean, well-cooked meats, such as chicken or fish. Tofu. Soups or broths.  Dairy  Low-fat dairy products, such as milk, cottage cheese, or yogurt.  Beverages    Water. Herbal tea. Apple juice.  Fats and oils  Mild salad dressings. Canola or olive oil.  Sweets and desserts  Pudding. Custard. Fruit gelatin. Ice cream.  The items listed above may not be a complete list of recommended foods and beverages. Contact a dietitian for more options.  What foods are not  recommended?  Grains  Whole grain breads and cereals.  Vegetables  Raw vegetables.  Fruits  Raw fruits, especially citrus, berries, or dried fruits.  Dairy  Whole fat dairy foods.  Beverages  Caffeinated drinks. Alcohol.  Seasonings and condiments  Strongly flavored seasonings or condiments. Hot sauce. Salsa.  Other foods  Spicy foods. Fried foods. Sour foods, such as pickled or fermented foods. Foods with high sugar content. Foods high in fiber.  The items listed above may not be a complete list of foods and beverages to avoid. Contact a dietitian for more information.  Summary  · A bland diet consists of foods that are often soft and do not have a lot of fat, fiber, or extra seasonings.  · Foods without fat, fiber, or seasoning are easier for the body to digest.  · Check with your health care provider to see how long you should follow this diet plan. It is not meant to be followed for long periods.  This information is not intended to replace advice given to you by your health care provider. Make sure you discuss any questions you have with your health care provider.  Document Revised: 01/16/2019 Document Reviewed: 01/16/2019  Elsevier Patient Education © 2020 [employer]

## 2021-02-24 NOTE — TELEPHONE ENCOUNTER
Called and left vm with patient. Let her know that we have faxed in referral to dr ragsdale and they should be reaching out for an appointment. Advised her to call with any questions or concerns.

## 2021-03-04 ENCOUNTER — DOCUMENTATION (OUTPATIENT)
Dept: ONCOLOGY | Facility: CLINIC | Age: 66
End: 2021-03-04

## 2021-03-04 ENCOUNTER — TRANSCRIBE ORDERS (OUTPATIENT)
Dept: GENERAL RADIOLOGY | Facility: HOSPITAL | Age: 66
End: 2021-03-04

## 2021-03-04 DIAGNOSIS — Z01.818 PRE-OP TESTING: Primary | ICD-10-CM

## 2021-03-04 NOTE — PROGRESS NOTES
3/4/2021    Reviewed CT scan from 2/23/2021 with radiology  Spoke with patient about results and recommendation for CT guided biopsy  Offered patient appointment to come and see me face to face to discuss but she said she did not need to at this time.  Patient is agreeable to CT guided biopsy and order placed today; confirmed pt is not on any blood thinners.

## 2021-03-08 RX ORDER — DEXLANSOPRAZOLE 60 MG/1
CAPSULE, DELAYED RELEASE ORAL
Qty: 90 CAPSULE | Refills: 3 | Status: SHIPPED | OUTPATIENT
Start: 2021-03-08 | End: 2021-12-16

## 2021-03-09 ENCOUNTER — LAB (OUTPATIENT)
Dept: LAB | Facility: HOSPITAL | Age: 66
End: 2021-03-09

## 2021-03-09 DIAGNOSIS — Z01.818 PRE-OP TESTING: ICD-10-CM

## 2021-03-09 PROCEDURE — C9803 HOPD COVID-19 SPEC COLLECT: HCPCS

## 2021-03-09 PROCEDURE — U0005 INFEC AGEN DETEC AMPLI PROBE: HCPCS

## 2021-03-09 PROCEDURE — U0004 COV-19 TEST NON-CDC HGH THRU: HCPCS

## 2021-03-10 LAB — SARS-COV-2 ORF1AB RESP QL NAA+PROBE: NOT DETECTED

## 2021-03-12 ENCOUNTER — HOSPITAL ENCOUNTER (OUTPATIENT)
Dept: GENERAL RADIOLOGY | Facility: HOSPITAL | Age: 66
Discharge: HOME | End: 2021-03-12

## 2021-03-12 ENCOUNTER — HOSPITAL ENCOUNTER (OUTPATIENT)
Dept: CT IMAGING | Facility: HOSPITAL | Age: 66
Discharge: HOME | End: 2021-03-12

## 2021-03-12 VITALS
BODY MASS INDEX: 30.36 KG/M2 | OXYGEN SATURATION: 95 % | RESPIRATION RATE: 20 BRPM | TEMPERATURE: 98.3 F | HEART RATE: 76 BPM | WEIGHT: 188.93 LBS | SYSTOLIC BLOOD PRESSURE: 137 MMHG | HEIGHT: 66 IN | DIASTOLIC BLOOD PRESSURE: 71 MMHG

## 2021-03-12 DIAGNOSIS — R91.1 SOLITARY LUNG NODULE: ICD-10-CM

## 2021-03-12 LAB
DEPRECATED RDW RBC AUTO: 43.2 FL (ref 37–54)
ERYTHROCYTE [DISTWIDTH] IN BLOOD BY AUTOMATED COUNT: 13.3 % (ref 12.3–15.4)
HCT VFR BLD AUTO: 41.4 % (ref 34–46.6)
HGB BLD-MCNC: 14.2 G/DL (ref 12–15.9)
INR PPP: 0.93 (ref 0.8–1.2)
MCH RBC QN AUTO: 30.7 PG (ref 26.6–33)
MCHC RBC AUTO-ENTMCNC: 34.3 G/DL (ref 31.5–35.7)
MCV RBC AUTO: 89.4 FL (ref 79–97)
PLATELET # BLD AUTO: 252 10*3/MM3 (ref 140–450)
PMV BLD AUTO: 10.2 FL (ref 6–12)
PROTHROMBIN TIME: 12.8 SECONDS (ref 11.1–15.3)
RBC # BLD AUTO: 4.63 10*6/MM3 (ref 3.77–5.28)
WBC # BLD AUTO: 6.87 10*3/MM3 (ref 3.4–10.8)

## 2021-03-12 PROCEDURE — 71045 X-RAY EXAM CHEST 1 VIEW: CPT

## 2021-03-12 PROCEDURE — 25010000002 FENTANYL CITRATE (PF) 100 MCG/2ML SOLUTION: Performed by: RADIOLOGY

## 2021-03-12 PROCEDURE — 85610 PROTHROMBIN TIME: CPT | Performed by: RADIOLOGY

## 2021-03-12 PROCEDURE — 88173 CYTOPATH EVAL FNA REPORT: CPT

## 2021-03-12 PROCEDURE — 25010000002 MIDAZOLAM PER 1 MG: Performed by: RADIOLOGY

## 2021-03-12 PROCEDURE — 85027 COMPLETE CBC AUTOMATED: CPT | Performed by: RADIOLOGY

## 2021-03-12 RX ORDER — LISINOPRIL 10 MG/1
10 TABLET ORAL DAILY
COMMUNITY
End: 2021-04-29 | Stop reason: SDUPTHER

## 2021-03-12 RX ORDER — FENTANYL CITRATE 50 UG/ML
INJECTION, SOLUTION INTRAMUSCULAR; INTRAVENOUS
Status: DISPENSED
Start: 2021-03-12 | End: 2021-03-12

## 2021-03-12 RX ORDER — LANOLIN ALCOHOL/MO/W.PET/CERES
1000 CREAM (GRAM) TOPICAL DAILY
COMMUNITY
End: 2022-11-30

## 2021-03-12 RX ORDER — FENTANYL CITRATE 50 UG/ML
INJECTION, SOLUTION INTRAMUSCULAR; INTRAVENOUS
Status: COMPLETED | OUTPATIENT
Start: 2021-03-12 | End: 2021-03-12

## 2021-03-12 RX ORDER — MIDAZOLAM HYDROCHLORIDE 1 MG/ML
INJECTION INTRAMUSCULAR; INTRAVENOUS
Status: DISPENSED
Start: 2021-03-12 | End: 2021-03-12

## 2021-03-12 RX ORDER — HYDROXYZINE PAMOATE 25 MG/1
25 CAPSULE ORAL 3 TIMES DAILY PRN
COMMUNITY
End: 2021-04-29

## 2021-03-12 RX ORDER — SODIUM CHLORIDE 0.9 % (FLUSH) 0.9 %
10 SYRINGE (ML) INJECTION AS NEEDED
Status: DISCONTINUED | OUTPATIENT
Start: 2021-03-12 | End: 2021-03-13 | Stop reason: HOSPADM

## 2021-03-12 RX ORDER — ACETAMINOPHEN 325 MG/1
650 TABLET ORAL ONCE
Status: COMPLETED | OUTPATIENT
Start: 2021-03-12 | End: 2021-03-12

## 2021-03-12 RX ORDER — MIDAZOLAM HYDROCHLORIDE 1 MG/ML
INJECTION INTRAMUSCULAR; INTRAVENOUS
Status: COMPLETED | OUTPATIENT
Start: 2021-03-12 | End: 2021-03-12

## 2021-03-12 RX ADMIN — FENTANYL CITRATE 25 MCG: 50 INJECTION, SOLUTION INTRAMUSCULAR; INTRAVENOUS at 11:31

## 2021-03-12 RX ADMIN — ACETAMINOPHEN 650 MG: 325 TABLET, FILM COATED ORAL at 12:55

## 2021-03-12 RX ADMIN — MIDAZOLAM HYDROCHLORIDE 1 MG: 2 INJECTION, SOLUTION INTRAMUSCULAR; INTRAVENOUS at 11:31

## 2021-03-12 NOTE — POST-PROCEDURE NOTE
PROCEDURE: CT GUIDANCE BIOPSY     INDICATION: Right lung base nodule    COMPARISON: CT chest dated 2/23/2021    This exam was performed according to our departmental dose optimization program, which includes automated exposure control, adjustment of the mA and/or KV according to patient size and/or use of iterative reconstruction technique.    TECHNIQUE: Following a discussion of the procedure, its benefits, and risks (including bleeding, infection, mistargeting, pneumothorax requiring chest tube and hospitalization) with the patient, an informed written consent was obtained and documented in the patient's chart.  The time-out form was completed to confirm patient identity and side/type of procedure.    Localizing CT demonstrated an adequate window for needle introduction into the lung nodule in the right lung base    The skin over the target area was cleansed with chlorhexidine. Local anesthesia was obtained using 2% lidocaine, superficially and at depth.     Using aseptic technique, a 25-gauge spinal needle was advanced into the lesion and fine needle aspiration was performed.     The specimen was handed directly to Dr. Tapia    The patient tolerated the procedure well without immediate complication.  The patient returned to the holding area in stable condition for monitoring.  Routine post procedure instructions were communicated to the patient.    COMPLICATIONS: Trace right pneumothorax is present after the procedure    Note: 1 mg IV Versed and 25 mcg IV Fentanyl was used for moderate sedation monitored under my direction.  Total intra-service time of sedation was 20 minutes. The patient’s vital signs were monitored throughout the procedure and recorded in the patient’s medical record by the nurse.    ESTIMATED BLOOD LOSS: Less than 2 cc.    CONCLUSION:  Successful CT  guided fine-needle aspiration of a right lower lung nodule

## 2021-03-16 ENCOUNTER — LAB (OUTPATIENT)
Dept: LAB | Facility: HOSPITAL | Age: 66
End: 2021-03-16

## 2021-03-16 DIAGNOSIS — Z01.818 PREOP TESTING: ICD-10-CM

## 2021-03-16 LAB
LAB AP CASE REPORT: NORMAL
PATH REPORT.FINAL DX SPEC: NORMAL
SARS-COV-2 ORF1AB RESP QL NAA+PROBE: NOT DETECTED

## 2021-03-16 PROCEDURE — C9803 HOPD COVID-19 SPEC COLLECT: HCPCS

## 2021-03-16 PROCEDURE — U0004 COV-19 TEST NON-CDC HGH THRU: HCPCS

## 2021-03-17 ENCOUNTER — IMMUNIZATION (OUTPATIENT)
Dept: VACCINE CLINIC | Facility: HOSPITAL | Age: 66
End: 2021-03-17

## 2021-03-17 PROCEDURE — 91300 HC SARSCOV02 VAC 30MCG/0.3ML IM: CPT | Performed by: THORACIC SURGERY (CARDIOTHORACIC VASCULAR SURGERY)

## 2021-03-17 PROCEDURE — 0002A: CPT | Performed by: THORACIC SURGERY (CARDIOTHORACIC VASCULAR SURGERY)

## 2021-03-18 DIAGNOSIS — R91.1 LUNG NODULE: Primary | ICD-10-CM

## 2021-03-19 ENCOUNTER — HOSPITAL ENCOUNTER (OUTPATIENT)
Facility: HOSPITAL | Age: 66
Setting detail: HOSPITAL OUTPATIENT SURGERY
Discharge: HOME | End: 2021-03-19
Attending: INTERNAL MEDICINE | Admitting: INTERNAL MEDICINE

## 2021-03-19 ENCOUNTER — ANESTHESIA (OUTPATIENT)
Dept: GASTROENTEROLOGY | Facility: HOSPITAL | Age: 66
End: 2021-03-19

## 2021-03-19 ENCOUNTER — ANESTHESIA EVENT (OUTPATIENT)
Dept: GASTROENTEROLOGY | Facility: HOSPITAL | Age: 66
End: 2021-03-19

## 2021-03-19 VITALS
SYSTOLIC BLOOD PRESSURE: 135 MMHG | OXYGEN SATURATION: 98 % | HEART RATE: 64 BPM | HEIGHT: 66 IN | DIASTOLIC BLOOD PRESSURE: 68 MMHG | WEIGHT: 189.8 LBS | BODY MASS INDEX: 30.5 KG/M2 | RESPIRATION RATE: 18 BRPM | TEMPERATURE: 97.9 F

## 2021-03-19 DIAGNOSIS — R10.84 GENERALIZED ABDOMINAL PAIN: ICD-10-CM

## 2021-03-19 DIAGNOSIS — R19.4 CHANGE IN BOWEL HABITS: ICD-10-CM

## 2021-03-19 DIAGNOSIS — R19.7 DIARRHEA, UNSPECIFIED TYPE: ICD-10-CM

## 2021-03-19 PROCEDURE — 43239 EGD BIOPSY SINGLE/MULTIPLE: CPT | Performed by: INTERNAL MEDICINE

## 2021-03-19 PROCEDURE — 88305 TISSUE EXAM BY PATHOLOGIST: CPT

## 2021-03-19 PROCEDURE — 45380 COLONOSCOPY AND BIOPSY: CPT | Performed by: INTERNAL MEDICINE

## 2021-03-19 PROCEDURE — 25010000002 PROPOFOL 10 MG/ML EMULSION: Performed by: NURSE ANESTHETIST, CERTIFIED REGISTERED

## 2021-03-19 RX ORDER — DEXTROSE AND SODIUM CHLORIDE 5; .45 G/100ML; G/100ML
30 INJECTION, SOLUTION INTRAVENOUS CONTINUOUS PRN
Status: DISCONTINUED | OUTPATIENT
Start: 2021-03-19 | End: 2021-03-19 | Stop reason: HOSPADM

## 2021-03-19 RX ORDER — PROPOFOL 10 MG/ML
VIAL (ML) INTRAVENOUS AS NEEDED
Status: DISCONTINUED | OUTPATIENT
Start: 2021-03-19 | End: 2021-03-19 | Stop reason: SURG

## 2021-03-19 RX ORDER — LIDOCAINE HYDROCHLORIDE 20 MG/ML
INJECTION, SOLUTION INTRAVENOUS AS NEEDED
Status: DISCONTINUED | OUTPATIENT
Start: 2021-03-19 | End: 2021-03-19 | Stop reason: SURG

## 2021-03-19 RX ADMIN — PROPOFOL 40 MG: 10 INJECTION, EMULSION INTRAVENOUS at 09:42

## 2021-03-19 RX ADMIN — PROPOFOL 50 MG: 10 INJECTION, EMULSION INTRAVENOUS at 09:35

## 2021-03-19 RX ADMIN — PROPOFOL 30 MG: 10 INJECTION, EMULSION INTRAVENOUS at 09:50

## 2021-03-19 RX ADMIN — PROPOFOL 50 MG: 10 INJECTION, EMULSION INTRAVENOUS at 09:49

## 2021-03-19 RX ADMIN — PROPOFOL 50 MG: 10 INJECTION, EMULSION INTRAVENOUS at 09:46

## 2021-03-19 RX ADMIN — LIDOCAINE HYDROCHLORIDE 50 MG: 20 INJECTION, SOLUTION INTRAVENOUS at 09:35

## 2021-03-19 RX ADMIN — DEXTROSE AND SODIUM CHLORIDE 30 ML/HR: 5; 450 INJECTION, SOLUTION INTRAVENOUS at 08:20

## 2021-03-19 RX ADMIN — PROPOFOL 50 MG: 10 INJECTION, EMULSION INTRAVENOUS at 09:37

## 2021-03-19 RX ADMIN — PROPOFOL 20 MG: 10 INJECTION, EMULSION INTRAVENOUS at 09:47

## 2021-03-19 RX ADMIN — PROPOFOL 30 MG: 10 INJECTION, EMULSION INTRAVENOUS at 09:40

## 2021-03-19 RX ADMIN — PROPOFOL 30 MG: 10 INJECTION, EMULSION INTRAVENOUS at 09:43

## 2021-03-19 NOTE — NURSING NOTE
I spoke with Bal to confirm transportation to home. Also advised of antcipated procedure start & time of d/c with understanding voiced.

## 2021-03-19 NOTE — ANESTHESIA PREPROCEDURE EVALUATION
Anesthesia Evaluation     Patient summary reviewed   NPO Solid Status: > 8 hours  NPO Liquid Status: > 2 hours           Airway   Mallampati: II  No difficulty expected  Dental - normal exam     Pulmonary - negative pulmonary ROS and normal exam   Cardiovascular     Rhythm: regular  Rate: normal    (+) hyperlipidemia,       Neuro/Psych  (+) psychiatric history Anxiety and Depression,     GI/Hepatic/Renal/Endo    (+)  GERD poorly controlled,      Musculoskeletal     (+) myalgias,   Abdominal    Substance History      OB/GYN          Other      history of cancer (Hx of breast CA) remission          Anesthesia Evaluation     Patient summary reviewed and Nursing notes reviewed   history of anesthetic complications (Scopalamine patch applied pre-op. ): PONV  NPO Solid Status: > 8 hours  NPO Liquid Status: > 4 hours           Airway   Mallampati: II  TM distance: >3 FB  Neck ROM: full  possible difficult intubation  Dental - normal exam     Pulmonary - normal exam    breath sounds clear to auscultation  (+) a smoker Former,     ROS comment: COMPARISON: 10/29/2016     FINDINGS: PA lateral chest. The bony structures are intact. The  cardiomediastinal silhouette is unremarkable. Lungs are clear. No  pneumothorax or pleural effusion.     IMPRESSION:  No acute cardiopulmonary abnormality.     Electronically signed by:  Hosea Adkins MD  6/17/2017 3:35 AM  Cardiovascular - normal exam    ECG reviewed  Rhythm: regular  Rate: normal    (+) hyperlipidemia,   (-) murmur    ROS comment: FINAL IMPRESSION:  1.   Normal LV systolic function. Ejection fraction of 55% to 60%.  2.   Grade 1 diastolic dysfunction of the left ventricular myocardium.  3.   Mild left atrial enlargement.  4.   No evidence of pericardial effusion.        VALDO YI M.D.  Electronically Signed  07/16/2016 14:50:39                                                                           Normal sinus rhythm  Normal ECG    Confirmed by QUETA JOHNSON MD ([id number]),   GWENDOLYN SMITH ([id number]) on  9/3/2018 6:24:34 PM    Neuro/Psych  (+) psychiatric history Anxiety and Depression,     GI/Hepatic/Renal/Endo    (+)  GERD well controlled,      Musculoskeletal     (+) myalgias (Fibromyalgia.),   Abdominal    Substance History - negative use     OB/GYN negative ob/gyn ROS         Other      history of cancer (Right breast cancer treated.) remission                      Anesthesia Plan    ASA 3     MAC     intravenous induction   Anesthetic plan, all risks, benefits, and alternatives have been provided, discussed and informed consent has been obtained with: patient and spouse/significant other.    Plan discussed with CRNA.             Anesthesia Plan    ASA 3     MAC     intravenous induction     Anesthetic plan, all risks, benefits, and alternatives have been provided, discussed and informed consent has been obtained with: patient.    Plan discussed with CRNA.

## 2021-03-19 NOTE — ANESTHESIA POSTPROCEDURE EVALUATION
Patient: Humera Swartz    Procedure Summary     Date: 03/19/21 Room / Location: [facility] ENDOSCOPY 1 / [facility] ENDOSCOPY    Anesthesia Start: 0934 Anesthesia Stop: 0955    Procedures:       ESOPHAGOGASTRODUODENOSCOPY (N/A )      COLONOSCOPY (N/A ) Diagnosis:       Change in bowel habits      Diarrhea, unspecified type      Generalized abdominal pain      (Change in bowel habits [R19.4])      (Diarrhea, unspecified type [R19.7])      (Generalized abdominal pain [R10.84])    Surgeons: Brooks Reinoso MD Provider: Darren Howard CRNA    Anesthesia Type: MAC ASA Status: 3          Anesthesia Type: MAC    Vitals  No vitals data found for the desired time range.          Post Anesthesia Care and Evaluation    Patient location during evaluation: bedside  Patient participation: complete - patient participated  Level of consciousness: sleepy but conscious  Pain score: 0  Pain management: adequate  Airway patency: patent  Anesthetic complications: No anesthetic complications  PONV Status: none  Cardiovascular status: acceptable  Respiratory status: acceptable  Hydration status: acceptable    Comments: ---------------------------               03/19/21                      0954         ---------------------------   BP:        (!) 150/67       Pulse:         61           Resp:          18           Temp:   97.6 °F (36.4 °C)   SpO2:          99%         ---------------------------

## 2021-03-23 ENCOUNTER — OFFICE VISIT (OUTPATIENT)
Dept: GASTROENTEROLOGY | Facility: CLINIC | Age: 66
End: 2021-03-23

## 2021-03-23 VITALS
HEART RATE: 72 BPM | HEIGHT: 66 IN | WEIGHT: 191 LBS | BODY MASS INDEX: 30.7 KG/M2 | SYSTOLIC BLOOD PRESSURE: 158 MMHG | DIASTOLIC BLOOD PRESSURE: 70 MMHG

## 2021-03-23 DIAGNOSIS — K44.9 HIATAL HERNIA: ICD-10-CM

## 2021-03-23 DIAGNOSIS — K21.00 GASTROESOPHAGEAL REFLUX DISEASE WITH ESOPHAGITIS WITHOUT HEMORRHAGE: Primary | ICD-10-CM

## 2021-03-23 DIAGNOSIS — K58.0 IRRITABLE BOWEL SYNDROME WITH DIARRHEA: ICD-10-CM

## 2021-03-23 LAB
LAB AP CASE REPORT: NORMAL
PATH REPORT.FINAL DX SPEC: NORMAL

## 2021-03-23 PROCEDURE — 99213 OFFICE O/P EST LOW 20 MIN: CPT | Performed by: NURSE PRACTITIONER

## 2021-03-23 NOTE — PROGRESS NOTES
Chief Complaint   Patient presents with   • Diarrhea       Subjective    Humera Swartz is a 66 y.o. female. she is here today for follow-up.  66-year-old female presents for follow-up after EGD and colonoscopy.  States she has been more stressed recently due  to having multiple tests regarding a lung nodule.  We tried patient on Bentyl with no significant improvement in symptoms.  States she still has 10-15 bowel movements per day.  Denies any nocturnal stooling or blood within her stool.    Diarrhea   This is a chronic problem. The problem occurs more than 10 times per day. The problem has been gradually worsening. The stool consistency is described as watery. The patient states that diarrhea does not awaken her from sleep. Associated symptoms include abdominal pain and bloating. Pertinent negatives include no arthralgias, chills, coughing, fever, headaches, increased  flatus, myalgias, sweats, vomiting or weight loss.     EGD noted mildly severe esophagitis gastritis normal duodenum and a medium size hiatal hernia.  Antral biopsy noted reactive gastropathy.  Esophageal biopsy noted reactive squamous mucosa.  Duodenal biopsy no no significance logic abnormality.  Colonoscopy noted normal perianal digital rectal exam hemorrhoids were found otherwise normal exam.  Random colonic biopsy no no significant histologic abnormality will need repeat colonoscopy in 5 years for surveillance.       The following portions of the patient's history were reviewed and updated as appropriate:   Past Medical History:   Diagnosis Date   • Acquired equinus deformity of foot     ANKLE   • Anxiety    • Breast cancer (CMS/HCC)    • Depression    • Diverticular disease of colon    • Esophagitis    • GERD (gastroesophageal reflux disease)    • Hx of radiation therapy    • Hyperlipidemia    • Plantar fasciitis    • Primary fibromyalgia syndrome    • Solitary pulmonary nodule present on computed tomography of lung      Past Surgical  History:   Procedure Laterality Date   • BREAST SURGERY      Carcinoma of the right breast;Mastectomy   •  SECTION     • CHOLECYSTECTOMY WITH INTRAOPERATIVE CHOLANGIOGRAM N/A 2018    Procedure: LAPAROSCOPIC POSSIBLE OPEN CHOLECYSTECTOMY WITH INTRAOPERATIVE CHOLANGIOGRAM    (C-Arm # 1);  Surgeon: Dirk Leigh MD;  Location: [facility] OR;  Service: General   • COLONOSCOPY  2016    Normal colon.No specimens collected   • COLONOSCOPY N/A 3/19/2021    Procedure: COLONOSCOPY;  Surgeon: Brooks Reinoso MD;  Location: [facility] ENDOSCOPY;  Service: Gastroenterology;  Laterality: N/A;   • DIAGNOSTIC LAPAROSCOPY  1977    (Amenorrhea, probable polycystic ovaries. Polycystic ovaries   • ENDOSCOPY N/A 2019    Procedure: ESOPHAGOGASTRODUODENOSCOPY possible dilation;  Surgeon: Brooks Reinoso MD;  Location: [facility] ENDOSCOPY;  Service: Gastroenterology   • ENDOSCOPY N/A 3/19/2021    Procedure: ESOPHAGOGASTRODUODENOSCOPY;  Surgeon: Brooks Reinoso MD;  Location: [facility] ENDOSCOPY;  Service: Gastroenterology;  Laterality: N/A;   • ESOPHAGOSCOPY / EGD  2016    Mildly severe esophagitis.Gastritis.Normal examined duodenum.Medium sized hiatus hernia   • EXCISION BREAST LESION W/ PREOP NEEDLE LOC  1987    Bilateral excision of breast masses. Bilateral breast masses; probable fibrocystic disease.   • HYSTEROSCOPY  2011    Exam under anesthesia, diagnostic hysterectomy with fractional dilation and curettage. Thickened endometrial stripe, on Tamoxifen therapy.   • OTHER SURGICAL HISTORY  2016    NEEDLE BIOPSY LYMPH NODES; Ultrasound directed core needle biopsy of the left axilla.     Family History   Problem Relation Age of Onset   • Diabetes Other    • Arthritis Other    • Breast cancer Maternal Aunt      OB History        3    Para   2    Term   2            AB   1    Living   2       SAB   1    TAB        Ectopic        Molar        Multiple        Live Births                   Prior to Admission medications    Medication Sig Start Date End Date Taking? Authorizing Provider   Cholecalciferol (VITAMIN D-3 PO) Take 1 tablet by mouth Daily.   Yes Daniele Russo MD   Dexilant 60 MG capsule TAKE 1 CAPSULE BY MOUTH EVERY DAY 3/8/21  Yes Dorita Spann APRN   dicyclomine (BENTYL) 10 MG capsule Take 1 capsule by mouth 3 (Three) Times a Day As Needed (cramping and diarrhea). 2/24/21  Yes Dorita Spann APRN   escitalopram (LEXAPRO) 10 MG tablet Take 10 mg by mouth Daily. 1/26/21  Yes Daniele Russo MD   fluticasone (FLONASE) 50 MCG/ACT nasal spray 2 sprays into the nostril(s) as directed by provider Daily As Needed for Rhinitis.   Yes Daniele Russo MD   hydrOXYzine pamoate (VISTARIL) 25 MG capsule Take 25 mg by mouth 3 (Three) Times a Day As Needed for Itching.   Yes Daniele Russo MD   ketoconazole (NIZORAL) 2 % shampoo APPLY TO SCALP AND LATHER. WAIT 10 MINUTES BEFORE RINSING. REPEAT 2 3 TIMES PER WEEK 1/30/21  Yes Daniele Russo MD   lisinopril (PRINIVIL,ZESTRIL) 10 MG tablet Take 10 mg by mouth Daily.   Yes Daniele Russo MD   Multiple Vitamins-Minerals (WOMENS MULTI PO) Take 1 tablet by mouth Daily.   Yes Daniele Russo MD   nitrofurantoin (MACRODANTIN) 100 MG capsule Take 100 mg by mouth Daily.   Yes Daniele Russo MD   Probiotic Product (PROBIOTIC DAILY PO) Take 1 capsule by mouth Daily.   Yes Daniele Russo MD   triamcinolone (KENALOG) 0.1 % cream APPLY A THIN LAYER TO THE AFFECTED AREA(S) ON HANDS BY TOPICAL ROUTE 2 TIMES PER DAY FOR ONE WEEK 12/31/20  Yes Daniele Russo MD   Turmeric (QC TUMERIC COMPLEX PO) Take 1 tablet by mouth Daily.   Yes Daniele Russo MD   vitamin B-12 (CYANOCOBALAMIN) 1000 MCG tablet Take 1,000 mcg by mouth Daily.   Yes Daniele Russo MD     Allergies   Allergen Reactions   • Erythromycin Other (See Comments)     SEVERE WEAKNESS   • Phenergan [Promethazine Hcl] Other (See  "Comments)     weakness   • Propofol Other (See Comments)     COUGH/WHEEZE  Runny nose   • Tetracyclines & Related GI Intolerance     Social History     Socioeconomic History   • Marital status:      Spouse name: Not on file   • Number of children: Not on file   • Years of education: Not on file   • Highest education level: Not on file   Tobacco Use   • Smoking status: Former Smoker   • Smokeless tobacco: Never Used   • Tobacco comment: Ceased Smoking 12 Years Prior   Vaping Use   • Vaping Use: Never used   Substance and Sexual Activity   • Alcohol use: No   • Drug use: No   • Sexual activity: Defer       Review of Systems  Review of Systems   Constitutional: Negative for activity change, appetite change, chills, diaphoresis, fatigue, fever, unexpected weight change and weight loss.   HENT: Negative for sore throat and trouble swallowing.    Respiratory: Negative for cough and shortness of breath.    Gastrointestinal: Positive for abdominal pain, bloating and diarrhea. Negative for abdominal distention, anal bleeding, blood in stool, constipation, flatus, nausea, rectal pain and vomiting.   Musculoskeletal: Negative for arthralgias and myalgias.   Skin: Negative for pallor.   Neurological: Negative for light-headedness and headaches.        /70 (BP Location: Left arm)   Pulse 72   Ht 167.6 cm (66\")   Wt 86.6 kg (191 lb)   BMI 30.83 kg/m²     Objective    Physical Exam  Constitutional:       General: She is not in acute distress.     Appearance: Normal appearance. She is well-developed.   Neck:      Thyroid: No thyroid mass or thyromegaly.   Cardiovascular:      Rate and Rhythm: Normal rate and regular rhythm.      Heart sounds: Normal heart sounds.   Pulmonary:      Effort: Pulmonary effort is normal. No respiratory distress.      Breath sounds: Normal breath sounds.   Abdominal:      General: Bowel sounds are normal. There is no distension.      Palpations: Abdomen is soft.      Tenderness: There is " abdominal tenderness in the right upper quadrant.      Hernia: No hernia is present.       Admission on 03/19/2021, Discharged on 03/19/2021   Component Date Value Ref Range Status   • Case Report 03/19/2021    Final                    Value:Surgical Pathology Report                         Case: [id number]                                  Authorizing Provider:  Brooks Reinoso MD        Collected:           03/19/2021 09:43 AM          Ordering Location:     [facility]             Received:            03/19/2021 12:56 PM                                 [place] [facility]                                                     Pathologist:           Hilario Tapia MD                                                           Specimens:   1) - Small Intestine, Duodenum                                                                      2) - Gastric, Antrum                                                                                3) - Esophagus, Distal                                                                              4) - Large Intestine, colonic mucosa                                                      • Final Diagnosis 03/19/2021    Final                    Value:This result contains rich text formatting which cannot be displayed here.     Assessment/Plan      1. Gastroesophageal reflux disease with esophagitis without hemorrhage    2. Hiatal hernia    3. Irritable bowel syndrome with diarrhea    .   Continue Dexilant daily for esophagitis and gastritis.  Trial of cholestyramine 1-2 times daily for bile acid diarrhea Bentyl as needed for abdominal cramping.  Follow-up in 1 month for recheck return office sooner if needed    Orders placed during this encounter include:  No orders of the defined types were placed in this encounter.      * Surgery not found *    Review and/or summary of lab tests, radiology, procedures, medications. Review and summary of old records and obtaining of history.  The risks and benefits of my recommendations, as well as other treatment options were discussed with the patient today. Questions were answered.    New Medications Ordered This Visit   Medications   • cholestyramine light 4 g powder     Sig: Take 1 packet by mouth 2 (Two) Times a Day. mixed with a liquid     Dispense:  378 g     Refill:  11       Follow-up: Return in about 4 weeks (around 4/20/2021) for Recheck.          This document has been electronically signed by DAVID Ho on March 23, 2021 14:37 CDT           I spent 18 minutes caring for Humera on this date of service. This time includes time spent by me in the following activities:preparing for the visit, reviewing tests, obtaining and/or reviewing a separately obtained history, performing a medically appropriate examination and/or evaluation , counseling and educating the patient/family/caregiver, ordering medications, tests, or procedures, referring and communicating with other health care professionals , documenting information in the medical record and care coordination    Results for orders placed or performed during the hospital encounter of 03/19/21   Tissue Pathology Exam    Specimen: A: Small Intestine, Duodenum; Tissue    B: Gastric, Antrum; Tissue    C: Esophagus, Distal; Tissue    D: Large Intestine; Tissue   Result Value Ref Range    Case Report       Surgical Pathology Report                         Case: [id number]                                  Authorizing Provider:  Brooks Reinoso MD        Collected:           03/19/2021 09:43 AM          Ordering Location:     [facility]             Received:            03/19/2021 12:56 PM                                 [place] [facility]                                                     Pathologist:           Hilario Tapia MD                                                           Specimens:   1) - Small Intestine, Duodenum                                                                       2) - Gastric, Antrum                                                                                3) - Esophagus, Distal                                                                              4) - Large Intestine, colonic mucosa                                                       Final Diagnosis       SEE SCANNED REPORT       Results for orders placed or performed in visit on 03/16/21   COVID-19,[facility], NP/OP SWAB IN UTM/VTM/SALINE TRANSPORT MEDIA,24 HR TAT - Swab, Nasopharynx    Specimen: Nasopharynx; Swab   Result Value Ref Range    COVID19 Not Detected Not Detected - Ref. Range   Results for orders placed or performed during the hospital encounter of 03/12/21   Fine Needle Aspiration    Specimen: Lung, Right Lower Lobe; Fine Needle Aspirate   Result Value Ref Range    Case Report       Medical Cytology Report                           Case: [id number]                                 Authorizing Provider:  Ashley Antoine APRN       Collected:           03/12/2021 11:32 AM          [facility]             Received:            03/12/2021 12:19 PM                                 [place]                                                              Specimen:    Lung, Right Lower Lobe, Right lung FNA                                                     Final Diagnosis       SEE SCANNED REPORT       Protime-INR    Specimen: Blood   Result Value Ref Range    Protime 12.8 11.1 - 15.3 Seconds    INR 0.93 0.80 - 1.20   CBC (No Diff)    Specimen: Blood   Result Value Ref Range    WBC 6.87 3.40 - 10.80 10*3/mm3    RBC 4.63 3.77 - 5.28 10*6/mm3    Hemoglobin 14.2 12.0 - 15.9 g/dL    Hematocrit 41.4 34.0 - 46.6 %    MCV 89.4 79.0 - 97.0 fL    MCH 30.7 26.6 - 33.0 pg    MCHC 34.3 31.5 - 35.7 g/dL    RDW 13.3 12.3 - 15.4 %    RDW-SD 43.2 37.0 - 54.0 fl    MPV 10.2 6.0 - 12.0 fL    Platelets 252 140 - 450 10*3/mm3   Results for orders placed or  performed in visit on 03/09/21   COVID-19,[facility]SE, NP/OP SWAB IN UTM/VTM/SALINE TRANSPORT MEDIA,24 HR TAT - Swab, Nasopharynx    Specimen: Nasopharynx; Swab   Result Value Ref Range    COVID19 Not Detected Not Detected - Ref. Range   Results for orders placed or performed in visit on 02/19/21   CBC Auto Differential    Specimen: Blood   Result Value Ref Range    WBC 7.06 3.40 - 10.80 10*3/mm3    RBC 4.62 3.77 - 5.28 10*6/mm3    Hemoglobin 14.1 12.0 - 15.9 g/dL    Hematocrit 41.0 34.0 - 46.6 %    MCV 88.7 79.0 - 97.0 fL    MCH 30.5 26.6 - 33.0 pg    MCHC 34.4 31.5 - 35.7 g/dL    RDW 12.9 12.3 - 15.4 %    RDW-SD 42.0 37.0 - 54.0 fl    MPV 10.1 6.0 - 12.0 fL    Platelets 248 140 - 450 10*3/mm3    Neutrophil % 57.7 42.7 - 76.0 %    Lymphocyte % 33.4 19.6 - 45.3 %    Monocyte % 5.7 5.0 - 12.0 %    Eosinophil % 2.5 0.3 - 6.2 %    Basophil % 0.4 0.0 - 1.5 %    Immature Grans % 0.3 0.0 - 0.5 %    Neutrophils, Absolute 4.07 1.70 - 7.00 10*3/mm3    Lymphocytes, Absolute 2.36 0.70 - 3.10 10*3/mm3    Monocytes, Absolute 0.40 0.10 - 0.90 10*3/mm3    Eosinophils, Absolute 0.18 0.00 - 0.40 10*3/mm3    Basophils, Absolute 0.03 0.00 - 0.20 10*3/mm3    Immature Grans, Absolute 0.02 0.00 - 0.05 10*3/mm3    nRBC 0.0 0.0 - 0.2 /100 WBC   Comprehensive Metabolic Panel    Specimen: Blood   Result Value Ref Range    Glucose 146 (H) 65 - 99 mg/dL    BUN 13 8 - 23 mg/dL    Creatinine 0.80 0.57 - 1.00 mg/dL    Sodium 141 136 - 145 mmol/L    Potassium 3.8 3.5 - 5.2 mmol/L    Chloride 103 98 - 107 mmol/L    CO2 28.0 22.0 - 29.0 mmol/L    Calcium 9.5 8.6 - 10.5 mg/dL    Total Protein 7.3 6.0 - 8.5 g/dL    Albumin 4.20 3.50 - 5.20 g/dL    ALT (SGPT) 17 1 - 33 U/L    AST (SGOT) 22 1 - 32 U/L    Alkaline Phosphatase 119 (H) 39 - 117 U/L    Total Bilirubin 0.7 0.0 - 1.2 mg/dL    eGFR Non African Amer 72 >60 mL/min/1.73    Globulin 3.1 gm/dL    A/G Ratio 1.4 g/dL    BUN/Creatinine Ratio 16.3 7.0 - 25.0    Anion Gap 10.0 5.0 - 15.0  mmol/L   Results for orders placed or performed during the hospital encounter of 04/14/20   Gold Top - SST   Result Value Ref Range    Extra Tube Hold for add-ons.    Green Top (Gel)   Result Value Ref Range    Extra Tube Hold for add-ons.    CORONAVIRUS (COVID-19),RT-PCR,[facility], NP SWAB IN LEXAR SALINE MEDIA - Swab, Nasopharynx    Specimen: Nasopharynx; Swab   Result Value Ref Range    Reference Lab Report      COVID19 Not Detected Not Detected   Urinalysis With Microscopic If Indicated (No Culture) - Urine, Clean Catch    Specimen: Urine, Clean Catch   Result Value Ref Range    Color, UA Yellow Yellow, Straw, Dark Yellow, Cathie    Appearance, UA Clear Clear    pH, UA 6.0 5.0 - 9.0    Specific Gravity, UA 1.007 1.003 - 1.030    Glucose, UA Negative Negative    Ketones, UA Negative Negative    Bilirubin, UA Negative Negative    Blood, UA Negative Negative    Protein, UA Negative Negative    Leuk Esterase, UA Negative Negative    Nitrite, UA Negative Negative    Urobilinogen, UA 0.2 E.U./dL 0.2 - 1.0 E.U./dL   CBC Auto Differential    Specimen: Blood   Result Value Ref Range    WBC 6.30 3.40 - 10.80 10*3/mm3    RBC 4.37 3.77 - 5.28 10*6/mm3    Hemoglobin 13.4 12.0 - 15.9 g/dL    Hematocrit 38.8 34.0 - 46.6 %    MCV 88.8 79.0 - 97.0 fL    MCH 30.7 26.6 - 33.0 pg    MCHC 34.5 31.5 - 35.7 g/dL    RDW 12.8 12.3 - 15.4 %    RDW-SD 41.7 37.0 - 54.0 fl    MPV 10.1 6.0 - 12.0 fL    Platelets 226 140 - 450 10*3/mm3    Neutrophil % 54.8 42.7 - 76.0 %    Lymphocyte % 35.2 19.6 - 45.3 %    Monocyte % 7.0 5.0 - 12.0 %    Eosinophil % 2.1 0.3 - 6.2 %    Basophil % 0.6 0.0 - 1.5 %    Immature Grans % 0.3 0.0 - 0.5 %    Neutrophils, Absolute 3.45 1.70 - 7.00 10*3/mm3    Lymphocytes, Absolute 2.22 0.70 - 3.10 10*3/mm3    Monocytes, Absolute 0.44 0.10 - 0.90 10*3/mm3    Eosinophils, Absolute 0.13 0.00 - 0.40 10*3/mm3    Basophils, Absolute 0.04 0.00 - 0.20 10*3/mm3    Immature Grans, Absolute 0.02 0.00 - 0.05 10*3/mm3    nRBC 0.0  0.0 - 0.2 /100 WBC     *Note: Due to a large number of results and/or encounters for the requested time period, some results have not been displayed. A complete set of results can be found in Results Review.

## 2021-03-23 NOTE — PATIENT INSTRUCTIONS
"BMI for Adults  What is BMI?  Body mass index (BMI) is a number that is calculated from a person's weight and height. BMI can help estimate how much of a person's weight is composed of fat. BMI does not measure body fat directly. Rather, it is an alternative to procedures that directly measure body fat, which can be difficult and expensive.  BMI can help identify people who may be at higher risk for certain medical problems.  What are BMI measurements used for?  BMI is used as a screening tool to identify possible weight problems. It helps determine whether a person is obese, overweight, a healthy weight, or underweight.  BMI is useful for:  · Identifying a weight problem that may be related to a medical condition or may increase the risk for medical problems.  · Promoting changes, such as changes in diet and exercise, to help reach a healthy weight. BMI screening can be repeated to see if these changes are working.  How is BMI calculated?  BMI involves measuring your weight in relation to your height. Both height and weight are measured, and the BMI is calculated from those numbers. This can be done either in English (U.S[place] or metric measurements. Note that charts and online BMI calculators are available to help you find your BMI quickly and easily without having to do these calculations yourself.  To calculate your BMI in English (U.S.) measurements:    1. Measure your weight in pounds (lb).  2. Multiply the number of pounds by 703.  ? For example, for a person who weighs 180 lb, multiply that number by 703, which equals 126,540.  3. Measure your height in inches. Then multiply that number by itself to get a measurement called \"inches squared.\"  ? For example, for a person who is 70 inches tall, the \"inches squared\" measurement is 70 inches x 70 inches, which equals 4,900 inches squared.  4. Divide the total from step 2 (number of lb x 703) by the total from step 3 (inches squared): 126,540 ÷ 4,900 = 25.8. This is " "your BMI.  To calculate your BMI in metric measurements:  1. Measure your weight in kilograms (kg).  2. Measure your height in meters (m). Then multiply that number by itself to get a measurement called \"meters squared.\"  ? For example, for a person who is 1.75 m tall, the \"meters squared\" measurement is 1.75 m x 1.75 m, which is equal to 3.1 meters squared.  3. Divide the number of kilograms (your weight) by the meters squared number. In this example: 70 ÷ 3.1 = 22.6. This is your BMI.  What do the results mean?  BMI charts are used to identify whether you are underweight, normal weight, overweight, or obese. The following guidelines will be used:  · Underweight: BMI less than 18.5.  · Normal weight: BMI between 18.5 and 24.9.  · Overweight: BMI between 25 and 29.9.  · Obese: BMI of 30 or above.  Keep these notes in mind:  · Weight includes both fat and muscle, so someone with a muscular build, such as an athlete, may have a BMI that is higher than 24.9. In cases like these, BMI is not an accurate measure of body fat.  · To determine if excess body fat is the cause of a BMI of 25 or higher, further assessments may need to be done by a health care provider.  · BMI is usually interpreted in the same way for men and women.  Where to find more information  For more information about BMI, including tools to quickly calculate your BMI, go to these websites:  · Centers for Disease Control and Prevention: www.cdc.gov  · American Heart Association: www.heart.org  · National Heart, Lung, and Blood Damar: www.nhlbi.nih.gov  Summary  · Body mass index (BMI) is a number that is calculated from a person's weight and height.  · BMI may help estimate how much of a person's weight is composed of fat. BMI can help identify those who may be at higher risk for certain medical problems.  · BMI can be measured using English measurements or metric measurements.  · BMI charts are used to identify whether you are underweight, normal " weight, overweight, or obese.  This information is not intended to replace advice given to you by your health care provider. Make sure you discuss any questions you have with your health care provider.  Document Revised: 09/09/2020 Document Reviewed: 07/17/2020  Elsevier Patient Education © 2021 [employer].

## 2021-03-24 RX ORDER — DICYCLOMINE HYDROCHLORIDE 10 MG/1
10 CAPSULE ORAL 3 TIMES DAILY PRN
Qty: 90 CAPSULE | Refills: 1 | Status: SHIPPED | OUTPATIENT
Start: 2021-03-24 | End: 2021-04-22

## 2021-04-22 RX ORDER — DICYCLOMINE HYDROCHLORIDE 10 MG/1
10 CAPSULE ORAL 3 TIMES DAILY PRN
Qty: 90 CAPSULE | Refills: 1 | Status: SHIPPED | OUTPATIENT
Start: 2021-04-22 | End: 2021-05-24

## 2021-04-29 ENCOUNTER — OFFICE VISIT (OUTPATIENT)
Dept: FAMILY MEDICINE CLINIC | Facility: CLINIC | Age: 66
End: 2021-04-29

## 2021-04-29 ENCOUNTER — LAB (OUTPATIENT)
Dept: LAB | Facility: HOSPITAL | Age: 66
End: 2021-04-29

## 2021-04-29 VITALS
BODY MASS INDEX: 30.86 KG/M2 | DIASTOLIC BLOOD PRESSURE: 74 MMHG | HEART RATE: 59 BPM | SYSTOLIC BLOOD PRESSURE: 146 MMHG | WEIGHT: 192 LBS | HEIGHT: 66 IN | OXYGEN SATURATION: 99 %

## 2021-04-29 DIAGNOSIS — Z76.89 ENCOUNTER TO ESTABLISH CARE: ICD-10-CM

## 2021-04-29 DIAGNOSIS — I10 ESSENTIAL HYPERTENSION: Primary | ICD-10-CM

## 2021-04-29 DIAGNOSIS — Z85.3 HX: BREAST CANCER: ICD-10-CM

## 2021-04-29 DIAGNOSIS — R73.09 ELEVATED GLUCOSE: ICD-10-CM

## 2021-04-29 DIAGNOSIS — F41.9 ANXIETY: ICD-10-CM

## 2021-04-29 DIAGNOSIS — K52.9 CHRONIC DIARRHEA: ICD-10-CM

## 2021-04-29 DIAGNOSIS — N81.4 UTERINE PROLAPSE: ICD-10-CM

## 2021-04-29 DIAGNOSIS — K21.00 GASTROESOPHAGEAL REFLUX DISEASE WITH ESOPHAGITIS, UNSPECIFIED WHETHER HEMORRHAGE: ICD-10-CM

## 2021-04-29 DIAGNOSIS — I10 ESSENTIAL HYPERTENSION: ICD-10-CM

## 2021-04-29 LAB
25(OH)D3 SERPL-MCNC: 44.1 NG/ML
ALBUMIN SERPL-MCNC: 4.1 G/DL (ref 3.5–5.2)
ALBUMIN/GLOB SERPL: 1.6 G/DL
ALP SERPL-CCNC: 103 U/L (ref 39–117)
ALT SERPL W P-5'-P-CCNC: 22 U/L (ref 1–33)
ANION GAP SERPL CALCULATED.3IONS-SCNC: 11.1 MMOL/L (ref 5–15)
AST SERPL-CCNC: 20 U/L (ref 1–32)
BASOPHILS # BLD AUTO: 0.03 10*3/MM3 (ref 0–0.2)
BASOPHILS NFR BLD AUTO: 0.6 % (ref 0–1.5)
BILIRUB SERPL-MCNC: 0.5 MG/DL (ref 0–1.2)
BUN SERPL-MCNC: 15 MG/DL (ref 8–23)
BUN/CREAT SERPL: 18.3 (ref 7–25)
CALCIUM SPEC-SCNC: 9.3 MG/DL (ref 8.6–10.5)
CHLORIDE SERPL-SCNC: 105 MMOL/L (ref 98–107)
CHOLEST SERPL-MCNC: 227 MG/DL (ref 0–200)
CO2 SERPL-SCNC: 25.9 MMOL/L (ref 22–29)
CREAT SERPL-MCNC: 0.82 MG/DL (ref 0.57–1)
DEPRECATED RDW RBC AUTO: 43.4 FL (ref 37–54)
EOSINOPHIL # BLD AUTO: 0.13 10*3/MM3 (ref 0–0.4)
EOSINOPHIL NFR BLD AUTO: 2.5 % (ref 0.3–6.2)
ERYTHROCYTE [DISTWIDTH] IN BLOOD BY AUTOMATED COUNT: 13.4 % (ref 12.3–15.4)
GFR SERPL CREATININE-BSD FRML MDRD: 70 ML/MIN/1.73
GLOBULIN UR ELPH-MCNC: 2.6 GM/DL
GLUCOSE SERPL-MCNC: 101 MG/DL (ref 65–99)
HBA1C MFR BLD: 5.43 % (ref 4.8–5.6)
HCT VFR BLD AUTO: 38.6 % (ref 34–46.6)
HDLC SERPL-MCNC: 41 MG/DL (ref 40–60)
HGB BLD-MCNC: 13.2 G/DL (ref 12–15.9)
IMM GRANULOCYTES # BLD AUTO: 0.01 10*3/MM3 (ref 0–0.05)
IMM GRANULOCYTES NFR BLD AUTO: 0.2 % (ref 0–0.5)
LDLC SERPL CALC-MCNC: 147 MG/DL (ref 0–100)
LDLC/HDLC SERPL: 3.48 {RATIO}
LYMPHOCYTES # BLD AUTO: 1.67 10*3/MM3 (ref 0.7–3.1)
LYMPHOCYTES NFR BLD AUTO: 32.1 % (ref 19.6–45.3)
MCH RBC QN AUTO: 30.4 PG (ref 26.6–33)
MCHC RBC AUTO-ENTMCNC: 34.2 G/DL (ref 31.5–35.7)
MCV RBC AUTO: 88.9 FL (ref 79–97)
MONOCYTES # BLD AUTO: 0.28 10*3/MM3 (ref 0.1–0.9)
MONOCYTES NFR BLD AUTO: 5.4 % (ref 5–12)
NEUTROPHILS NFR BLD AUTO: 3.08 10*3/MM3 (ref 1.7–7)
NEUTROPHILS NFR BLD AUTO: 59.2 % (ref 42.7–76)
NRBC BLD AUTO-RTO: 0 /100 WBC (ref 0–0.2)
PLATELET # BLD AUTO: 226 10*3/MM3 (ref 140–450)
PMV BLD AUTO: 10.9 FL (ref 6–12)
POTASSIUM SERPL-SCNC: 3.7 MMOL/L (ref 3.5–5.2)
PROT SERPL-MCNC: 6.7 G/DL (ref 6–8.5)
RBC # BLD AUTO: 4.34 10*6/MM3 (ref 3.77–5.28)
SODIUM SERPL-SCNC: 142 MMOL/L (ref 136–145)
TRIGL SERPL-MCNC: 216 MG/DL (ref 0–150)
TSH SERPL DL<=0.05 MIU/L-ACNC: 0.97 UIU/ML (ref 0.27–4.2)
VIT B12 BLD-MCNC: 656 PG/ML (ref 211–946)
VLDLC SERPL-MCNC: 39 MG/DL (ref 5–40)
WBC # BLD AUTO: 5.2 10*3/MM3 (ref 3.4–10.8)

## 2021-04-29 PROCEDURE — 84443 ASSAY THYROID STIM HORMONE: CPT

## 2021-04-29 PROCEDURE — 80053 COMPREHEN METABOLIC PANEL: CPT

## 2021-04-29 PROCEDURE — 99203 OFFICE O/P NEW LOW 30 MIN: CPT | Performed by: FAMILY MEDICINE

## 2021-04-29 PROCEDURE — 85025 COMPLETE CBC W/AUTO DIFF WBC: CPT

## 2021-04-29 PROCEDURE — 82607 VITAMIN B-12: CPT

## 2021-04-29 PROCEDURE — 80061 LIPID PANEL: CPT

## 2021-04-29 PROCEDURE — 36415 COLL VENOUS BLD VENIPUNCTURE: CPT

## 2021-04-29 PROCEDURE — 82306 VITAMIN D 25 HYDROXY: CPT

## 2021-04-29 PROCEDURE — 83036 HEMOGLOBIN GLYCOSYLATED A1C: CPT

## 2021-04-29 RX ORDER — ESCITALOPRAM OXALATE 10 MG/1
10 TABLET ORAL DAILY
Qty: 90 TABLET | Refills: 3 | Status: SHIPPED | OUTPATIENT
Start: 2021-04-29 | End: 2022-01-03

## 2021-04-29 RX ORDER — ALBUTEROL SULFATE 90 UG/1
AEROSOL, METERED RESPIRATORY (INHALATION)
COMMUNITY
Start: 2021-03-23 | End: 2022-03-22

## 2021-04-29 RX ORDER — LISINOPRIL 10 MG/1
10 TABLET ORAL DAILY
Qty: 90 TABLET | Refills: 3 | Status: SHIPPED | OUTPATIENT
Start: 2021-04-29 | End: 2022-11-30 | Stop reason: SDUPTHER

## 2021-05-03 ENCOUNTER — TELEPHONE (OUTPATIENT)
Dept: FAMILY MEDICINE CLINIC | Facility: CLINIC | Age: 66
End: 2021-05-03

## 2021-05-03 DIAGNOSIS — E78.5 HYPERLIPIDEMIA, UNSPECIFIED HYPERLIPIDEMIA TYPE: Primary | ICD-10-CM

## 2021-05-03 RX ORDER — ATORVASTATIN CALCIUM 20 MG/1
20 TABLET, FILM COATED ORAL DAILY
Qty: 30 TABLET | Refills: 5 | Status: SHIPPED | OUTPATIENT
Start: 2021-05-03 | End: 2021-10-26

## 2021-05-04 NOTE — TELEPHONE ENCOUNTER
Per Dr. Munoz, Ms. Swartz has been called with recent lab results and recommendations.   Continue current medications and follow-up as planned or sooner if any problems.

## 2021-05-04 NOTE — TELEPHONE ENCOUNTER
Please let patient know the following labs:    - HgbA1c 5.43%  - Lipid panel shows elevated total and LDL cholesterol.  10 year risk of ASCVD is 10.6%.  It is indicated for her to start a cholesterol medication.  Will send prescription for Lipitor 20 mg daily.  Please let me know if there are any concerns with starting this.    - CBC is normal  - CMP is ok  - Vitamin B12 normal  - TSH is normal  - Vitamin D is normal      JANNETTE Velasquez

## 2021-05-24 RX ORDER — DICYCLOMINE HYDROCHLORIDE 10 MG/1
10 CAPSULE ORAL 3 TIMES DAILY PRN
Qty: 90 CAPSULE | Refills: 1 | Status: SHIPPED | OUTPATIENT
Start: 2021-05-24 | End: 2021-06-21

## 2021-06-21 RX ORDER — DICYCLOMINE HYDROCHLORIDE 10 MG/1
CAPSULE ORAL
Qty: 90 CAPSULE | Refills: 1 | Status: SHIPPED | OUTPATIENT
Start: 2021-06-21 | End: 2022-11-30

## 2021-09-01 ENCOUNTER — TELEPHONE (OUTPATIENT)
Dept: ONCOLOGY | Facility: HOSPITAL | Age: 66
End: 2021-09-01

## 2021-09-01 DIAGNOSIS — R91.1 LUNG NODULE: Primary | ICD-10-CM

## 2021-09-01 NOTE — TELEPHONE ENCOUNTER
----- Message from DAVID Ferguson sent at 9/1/2021  8:47 AM CDT -----  Let her know that it is time to repeat her CT scan of chest, I will place order and then she will need appt to see me    Thanks  Ashley

## 2021-09-04 ENCOUNTER — TRANSCRIBE ORDERS (OUTPATIENT)
Dept: GENERAL RADIOLOGY | Facility: HOSPITAL | Age: 66
End: 2021-09-04

## 2021-09-04 DIAGNOSIS — R91.1 LUNG NODULE: Primary | ICD-10-CM

## 2021-09-09 ENCOUNTER — LAB (OUTPATIENT)
Dept: LAB | Facility: HOSPITAL | Age: 66
End: 2021-09-09

## 2021-09-09 ENCOUNTER — HOSPITAL ENCOUNTER (OUTPATIENT)
Dept: CT IMAGING | Facility: HOSPITAL | Age: 66
Discharge: HOME | End: 2021-09-09

## 2021-09-09 DIAGNOSIS — R91.1 LUNG NODULE: ICD-10-CM

## 2021-09-09 LAB
BUN SERPL-MCNC: 14 MG/DL (ref 8–23)
CREAT SERPL-MCNC: 0.85 MG/DL (ref 0.57–1)
GFR SERPL CREATININE-BSD FRML MDRD: 67 ML/MIN/1.73

## 2021-09-09 PROCEDURE — 36415 COLL VENOUS BLD VENIPUNCTURE: CPT

## 2021-09-09 PROCEDURE — 82565 ASSAY OF CREATININE: CPT

## 2021-09-09 PROCEDURE — 25010000002 IOPAMIDOL 61 % SOLUTION: Performed by: NURSE PRACTITIONER

## 2021-09-09 PROCEDURE — 71260 CT THORAX DX C+: CPT

## 2021-09-09 PROCEDURE — 84520 ASSAY OF UREA NITROGEN: CPT

## 2021-09-09 RX ADMIN — IOPAMIDOL 90 ML: 612 INJECTION, SOLUTION INTRAVENOUS at 18:01

## 2021-09-20 RX ORDER — DICYCLOMINE HYDROCHLORIDE 10 MG/1
CAPSULE ORAL
Qty: 270 CAPSULE | OUTPATIENT
Start: 2021-09-20

## 2021-10-26 DIAGNOSIS — E78.5 HYPERLIPIDEMIA, UNSPECIFIED HYPERLIPIDEMIA TYPE: ICD-10-CM

## 2021-10-26 RX ORDER — ATORVASTATIN CALCIUM 20 MG/1
TABLET, FILM COATED ORAL
Qty: 90 TABLET | Refills: 1 | Status: SHIPPED | OUTPATIENT
Start: 2021-10-26 | End: 2022-03-22

## 2021-10-28 ENCOUNTER — OFFICE VISIT (OUTPATIENT)
Dept: FAMILY MEDICINE CLINIC | Facility: CLINIC | Age: 66
End: 2021-10-28

## 2021-10-28 VITALS
SYSTOLIC BLOOD PRESSURE: 144 MMHG | RESPIRATION RATE: 18 BRPM | HEART RATE: 61 BPM | OXYGEN SATURATION: 98 % | TEMPERATURE: 98 F | HEIGHT: 66 IN | BODY MASS INDEX: 30.7 KG/M2 | WEIGHT: 191 LBS | DIASTOLIC BLOOD PRESSURE: 84 MMHG

## 2021-10-28 DIAGNOSIS — J06.9 UPPER RESPIRATORY TRACT INFECTION, UNSPECIFIED TYPE: Primary | ICD-10-CM

## 2021-10-28 PROCEDURE — 99213 OFFICE O/P EST LOW 20 MIN: CPT | Performed by: NURSE PRACTITIONER

## 2021-10-28 PROCEDURE — 96372 THER/PROPH/DIAG INJ SC/IM: CPT | Performed by: NURSE PRACTITIONER

## 2021-10-28 RX ORDER — GUAIFENESIN 600 MG/1
1200 TABLET, EXTENDED RELEASE ORAL 2 TIMES DAILY
Qty: 28 TABLET | Refills: 0 | Status: SHIPPED | OUTPATIENT
Start: 2021-10-28 | End: 2022-03-10 | Stop reason: SDUPTHER

## 2021-10-28 RX ORDER — CHOLESTYRAMINE 4 G/9G
1 POWDER, FOR SUSPENSION ORAL
COMMUNITY
Start: 2021-06-18 | End: 2022-03-22

## 2021-10-28 RX ORDER — TRIAMCINOLONE ACETONIDE 40 MG/ML
40 INJECTION, SUSPENSION INTRA-ARTICULAR; INTRAMUSCULAR ONCE
Status: COMPLETED | OUTPATIENT
Start: 2021-10-28 | End: 2021-10-28

## 2021-10-28 RX ADMIN — TRIAMCINOLONE ACETONIDE 40 MG: 40 INJECTION, SUSPENSION INTRA-ARTICULAR; INTRAMUSCULAR at 15:39

## 2021-10-28 NOTE — PROGRESS NOTES
CC: upper respiratory infection    History:  Humera Pedro is a 66 y.o. female who presents today for evaluation of the above problems.      Sick for 4-5 days.  Traveled to [place] last week. Complains of congestion, cough, body aches and shortness of breath.   Had rapid covid test yesterday that was negative.  Started on Augmentin.      HPI  ROS:  Review of Systems   Constitutional: Positive for chills. Negative for fever.   HENT: Positive for congestion and sore throat.    Respiratory: Positive for cough and shortness of breath.    Musculoskeletal: Positive for myalgias.       Allergies   Allergen Reactions   • Erythromycin Other (See Comments)     SEVERE WEAKNESS   • Phenergan [Promethazine Hcl] Other (See Comments)     weakness   • Propofol Other (See Comments)     COUGH/WHEEZE  Runny nose   • Tetracyclines & Related GI Intolerance     Past Medical History:   Diagnosis Date   • Acquired equinus deformity of foot     ANKLE   • Anxiety    • Breast cancer (HCC)    • Depression    • Diverticular disease of colon    • Esophagitis    • GERD (gastroesophageal reflux disease)    • Hx of radiation therapy    • Hyperlipidemia    • Plantar fasciitis    • Primary fibromyalgia syndrome    • Solitary pulmonary nodule present on computed tomography of lung      Past Surgical History:   Procedure Laterality Date   • BREAST SURGERY      Carcinoma of the right breast;Mastectomy   •  SECTION     • CHOLECYSTECTOMY WITH INTRAOPERATIVE CHOLANGIOGRAM N/A 2018    Procedure: LAPAROSCOPIC POSSIBLE OPEN CHOLECYSTECTOMY WITH INTRAOPERATIVE CHOLANGIOGRAM    (C-Arm # 1);  Surgeon: Dirk Leigh MD;  Location: [facility] OR;  Service: General   • COLONOSCOPY  2016    Normal colon.No specimens collected   • COLONOSCOPY N/A 3/19/2021    Procedure: COLONOSCOPY;  Surgeon: Brooks Reinoso MD;  Location: [facility] ENDOSCOPY;  Service: Gastroenterology;  Laterality: N/A;   • DIAGNOSTIC LAPAROSCOPY  1977    (Amenorrhea,  probable polycystic ovaries. Polycystic ovaries   • ENDOSCOPY N/A 8/20/2019    Procedure: ESOPHAGOGASTRODUODENOSCOPY possible dilation;  Surgeon: Brooks Reinoso MD;  Location: [facility] ENDOSCOPY;  Service: Gastroenterology   • ENDOSCOPY N/A 3/19/2021    Procedure: ESOPHAGOGASTRODUODENOSCOPY;  Surgeon: Brooks Reinoso MD;  Location: [facility] ENDOSCOPY;  Service: Gastroenterology;  Laterality: N/A;   • ESOPHAGOSCOPY / EGD  02/22/2016    Mildly severe esophagitis.Gastritis.Normal examined duodenum.Medium sized hiatus hernia   • EXCISION BREAST LESION W/ PREOP NEEDLE LOC  06/17/1987    Bilateral excision of breast masses. Bilateral breast masses; probable fibrocystic disease.   • HYSTEROSCOPY  12/13/2011    Exam under anesthesia, diagnostic hysterectomy with fractional dilation and curettage. Thickened endometrial stripe, on Tamoxifen therapy.   • OTHER SURGICAL HISTORY  02/12/2016    NEEDLE BIOPSY LYMPH NODES; Ultrasound directed core needle biopsy of the left axilla.     Family History   Problem Relation Age of Onset   • Diabetes Other    • Arthritis Other    • Breast cancer Maternal Aunt       reports that she has quit smoking. She has never used smokeless tobacco. She reports that she does not drink alcohol and does not use drugs.      Current Outpatient Medications:   •  albuterol sulfate  (90 Base) MCG/ACT inhaler, INHALE 1 2 PUFFS EVERY 6 HOURS AS NEEDED, Disp: , Rfl:   •  atorvastatin (LIPITOR) 20 MG tablet, TAKE 1 TABLET BY MOUTH EVERY DAY, Disp: 90 tablet, Rfl: 1  •  cholestyramine (QUESTRAN) 4 g packet, Take 1 packet by mouth., Disp: , Rfl:   •  Dexilant 60 MG capsule, TAKE 1 CAPSULE BY MOUTH EVERY DAY, Disp: 90 capsule, Rfl: 3  •  dicyclomine (BENTYL) 10 MG capsule, TAKE 1 CAPSULE BY MOUTH 3 TIMES A DAY AS NEEDED FOR CRAMPING AND DIARRHEA, Disp: 90 capsule, Rfl: 1  •  escitalopram (LEXAPRO) 10 MG tablet, Take 1 tablet by mouth Daily., Disp: 90 tablet, Rfl: 3  •  fluticasone (FLONASE) 50 MCG/ACT nasal  "spray, 2 sprays into the nostril(s) as directed by provider Daily As Needed for Rhinitis., Disp: , Rfl:   •  ketoconazole (NIZORAL) 2 % shampoo, APPLY TO SCALP AND LATHER. WAIT 10 MINUTES BEFORE RINSING. REPEAT 2 3 TIMES PER WEEK, Disp: , Rfl:   •  lisinopril (PRINIVIL,ZESTRIL) 10 MG tablet, Take 1 tablet by mouth Daily., Disp: 90 tablet, Rfl: 3  •  Multiple Vitamins-Minerals (WOMENS MULTI PO), Take 1 tablet by mouth Daily., Disp: , Rfl:   •  nitrofurantoin (MACRODANTIN) 100 MG capsule, Take 100 mg by mouth Daily., Disp: , Rfl:   •  triamcinolone (KENALOG) 0.1 % cream, APPLY A THIN LAYER TO THE AFFECTED AREA(S) ON HANDS BY TOPICAL ROUTE 2 TIMES PER DAY FOR ONE WEEK, Disp: , Rfl:   •  vitamin B-12 (CYANOCOBALAMIN) 1000 MCG tablet, Take 1,000 mcg by mouth Daily., Disp: , Rfl:   •  guaiFENesin (Mucinex) 600 MG 12 hr tablet, Take 2 tablets by mouth 2 (Two) Times a Day., Disp: 28 tablet, Rfl: 0    Current Facility-Administered Medications:   •  triamcinolone acetonide (KENALOG-40) injection 40 mg, 40 mg, Intramuscular, Once, Xochitl Motta, APRN    OBJECTIVE:  /84 (BP Location: Left arm, Patient Position: Sitting, Cuff Size: Large Adult)   Pulse 61   Temp 98 °F (36.7 °C)   Resp 18   Ht 167.6 cm (66\")   Wt 86.6 kg (191 lb)   SpO2 98%   BMI 30.83 kg/m²    Physical Exam  Vitals reviewed.   Constitutional:       Appearance: She is well-developed.   HENT:      Right Ear: Tympanic membrane, ear canal and external ear normal.      Left Ear: Tympanic membrane, ear canal and external ear normal.   Cardiovascular:      Rate and Rhythm: Normal rate and regular rhythm.      Heart sounds: Normal heart sounds.   Pulmonary:      Effort: Pulmonary effort is normal.      Breath sounds: Normal breath sounds.   Neurological:      Mental Status: She is alert and oriented to person, place, and time.   Psychiatric:         Behavior: Behavior normal.         Assessment/Plan    Diagnoses and all orders for this visit:    1. Upper " respiratory tract infection, unspecified type (Primary)  -     COVID-19,[facility] ROUTINE, NP/OP SWAB IN TRANSPORT MEDIA OR ESWAB 72 HR TAT - Swab, Oropharynx  -     triamcinolone acetonide (KENALOG-40) injection 40 mg  -     guaiFENesin (Mucinex) 600 MG 12 hr tablet; Take 2 tablets by mouth 2 (Two) Times a Day.  Dispense: 28 tablet; Refill: 0    Repeat Covid Swab. Continue Augmentin.     An After Visit Summary was printed and given to the patient at discharge.  Return if symptoms worsen or fail to improve, for Next scheduled follow up.       Xochitl Motta, DAVID 10/28/21    Electronically signed.

## 2021-10-29 ENCOUNTER — PATIENT ROUNDING (BHMG ONLY) (OUTPATIENT)
Dept: FAMILY MEDICINE CLINIC | Facility: CLINIC | Age: 66
End: 2021-10-29

## 2021-10-29 LAB
LABCORP SARS-COV-2, NAA 2 DAY TAT: NORMAL
SARS-COV-2 RNA RESP QL NAA+PROBE: NOT DETECTED

## 2021-10-29 NOTE — PROGRESS NOTES
October 29, 2021    Hello, may I speak with Humera Pedro?    My name is Isabella      I am  with [facility]  [address], [address]  [place] [address] [phone].    Before we get started may I verify your date of birth? 1955    I am calling to officially welcome you to our practice and ask about your recent visit. Is this a good time to talk? no    Pt did not answer. Left VM.     Thank you, and have a great day.

## 2021-12-16 RX ORDER — DEXLANSOPRAZOLE 60 MG/1
CAPSULE, DELAYED RELEASE ORAL
Qty: 90 CAPSULE | Refills: 0 | Status: SHIPPED | OUTPATIENT
Start: 2021-12-16 | End: 2022-06-08 | Stop reason: SDUPTHER

## 2021-12-22 ENCOUNTER — TELEPHONE (OUTPATIENT)
Dept: FAMILY MEDICINE CLINIC | Facility: CLINIC | Age: 66
End: 2021-12-22

## 2021-12-22 NOTE — TELEPHONE ENCOUNTER
PT IS SCHED 01/25/22 FOR A BREAST LUMP. SHE STATES SHE HAS A HISTORY OF BREAST CANCER AND WOULD LIKE TO BE SEEN SOONER. I HAD HER SCHED W/ RITESH ON 12/22 BUT SHE WANTS TO BE SEEN BY YOU AND WANTS TO KNOW IF SHE CAN BE WORKED IN SOMEWHERE.

## 2021-12-23 NOTE — TELEPHONE ENCOUNTER
She can be worked in Monday 1/3/2021 at 3:15PM, this is the first day I am back.  However I would like for her to see Sheila HERNANDEZ so that orders can be placed for mammogram/ultrasound and the process can be started.  Thanks, JANNETTE Munoz

## 2021-12-23 NOTE — TELEPHONE ENCOUNTER
I called patient and left a message for her to call and ask for me to get her scheduled. 12-23-21 RR

## 2022-01-03 ENCOUNTER — OFFICE VISIT (OUTPATIENT)
Dept: FAMILY MEDICINE CLINIC | Facility: CLINIC | Age: 67
End: 2022-01-03

## 2022-01-03 VITALS
HEIGHT: 66 IN | BODY MASS INDEX: 30.22 KG/M2 | OXYGEN SATURATION: 98 % | SYSTOLIC BLOOD PRESSURE: 120 MMHG | HEART RATE: 60 BPM | DIASTOLIC BLOOD PRESSURE: 84 MMHG | WEIGHT: 188 LBS

## 2022-01-03 DIAGNOSIS — R22.30 AXILLARY FULLNESS: ICD-10-CM

## 2022-01-03 DIAGNOSIS — F41.9 ANXIETY: ICD-10-CM

## 2022-01-03 DIAGNOSIS — N64.4 BREAST PAIN, LEFT: Primary | ICD-10-CM

## 2022-01-03 PROCEDURE — 99213 OFFICE O/P EST LOW 20 MIN: CPT | Performed by: FAMILY MEDICINE

## 2022-01-03 RX ORDER — CLOBETASOL PROPIONATE 0.46 MG/ML
SOLUTION TOPICAL 2 TIMES DAILY
Qty: 50 ML | Refills: 2 | OUTPATIENT
Start: 2022-01-03 | End: 2022-03-22

## 2022-01-03 RX ORDER — CITALOPRAM 20 MG/1
20 TABLET ORAL DAILY
COMMUNITY
End: 2022-01-03 | Stop reason: SDUPTHER

## 2022-01-03 RX ORDER — CITALOPRAM 20 MG/1
20 TABLET ORAL DAILY
Qty: 90 TABLET | Refills: 3 | Status: SHIPPED | OUTPATIENT
Start: 2022-01-03 | End: 2022-11-30 | Stop reason: SDUPTHER

## 2022-01-03 NOTE — PROGRESS NOTES
"Chief Complaint  Breast Mass (left breast, tender, x 2 months) and Shoulder Pain (shoulder blade pain, left side)    Subjective          Humera Pedro presents to [facility] [place]  History of Present Illness    Patient presents today with tenderness in the right upper outer quadrant of breast and in axillary region.  Has been present for several weeks.  She has associated pain on the left shoulder and upper back as well.  Patient has not felt any mass.  No skin changes.  No nipple retraction or nipple discharge.  Last mammogram done 02/23/2021, BI-RADs Category 1.  Patient reports having COVID19 vaccine 11/22/2021.     Depression anxiety symptoms not well controlled.  Patient has been on other medications previously including Zoloft (helped but was hard to wean off) and Prozac (did not like the way she felt on this medication).      Objective   Vital Signs:   /84   Pulse 60   Ht 167.6 cm (66\")   Wt 85.3 kg (188 lb)   SpO2 98%   BMI 30.34 kg/m²     Physical Exam  Vitals reviewed. Exam conducted with a chaperone present.   Constitutional:       General: She is not in acute distress.     Appearance: She is well-developed.   Cardiovascular:      Rate and Rhythm: Normal rate and regular rhythm.      Heart sounds: Normal heart sounds. No murmur heard.      Pulmonary:      Effort: Pulmonary effort is normal. No respiratory distress.      Breath sounds: Normal breath sounds. No wheezing or rales.   Chest:   Breasts:      Right: Normal.      Left: Tenderness (upper outer quadrant, left axilla) present. No inverted nipple, mass, nipple discharge or skin change.       Skin:     General: Skin is warm and dry.   Neurological:      Mental Status: She is alert and oriented to person, place, and time.        Result Review :   The following data was reviewed by: Adrienne Munoz MD on 01/03/2022:  Common labs    Common Labsle 4/29/21 4/29/21 4/29/21 4/29/21 9/9/21 " 9/9/21    1136 1136 1136 1136 1631 1631   Glucose    101 (A)     BUN    15 14    Creatinine    0.82  0.85   eGFR Non African Am    70  67   Sodium    142     Potassium    3.7     Chloride    105     Calcium    9.3     Albumin    4.10     Total Bilirubin    0.5     Alkaline Phosphatase    103     AST (SGOT)    20     ALT (SGPT)    22     WBC 5.20        Hemoglobin 13.2        Hematocrit 38.6        Platelets 226        Total Cholesterol   227 (A)      Triglycerides   216 (A)      HDL Cholesterol   41      LDL Cholesterol    147 (A)      Hemoglobin A1C  5.43       (A) Abnormal value                      Assessment and Plan    Diagnoses and all orders for this visit:    1. Breast pain, left (Primary)  -     US Breast Left Limited; Future  -     Mammo Diagnostic Digital Tomosynthesis Left With CAD; Future    2. Axillary fullness  -     US Breast Left Limited; Future  -     Mammo Diagnostic Digital Tomosynthesis Left With CAD; Future    3. Anxiety  -     citalopram (CeleXA) 20 MG tablet; Take 1 tablet by mouth Daily.  Dispense: 90 tablet; Refill: 3    Other orders  -     clobetasol (TEMOVATE) 0.05 % external solution; Apply  topically to the appropriate area as directed 2 (Two) Times a Day.  Dispense: 50 mL; Refill: 2      Patient seen today for left breast tenderness  History of breast cancer  Will check mammogram and ultrasound left breast for further evaluation  Discussed that axillary tenderness/fullness may be related to COVID vaccine    Start Celexa for anxiety  Risks and benefits of medication discussed            Follow Up   Return in about 3 months (around 4/3/2022) for Recheck.  Patient was given instructions and counseling regarding her condition or for health maintenance advice. Please see specific information pulled into the AVS if appropriate.         This document has been electronically signed by Adrienne Munoz MD

## 2022-01-31 ENCOUNTER — TELEPHONE (OUTPATIENT)
Dept: FAMILY MEDICINE CLINIC | Facility: CLINIC | Age: 67
End: 2022-01-31

## 2022-01-31 NOTE — TELEPHONE ENCOUNTER
Patient has been called and information relayed. She states it has done this before and said she does not know why it keeps doing it.   I did ask about her caffeine intake and she said it could be that.     She has an appointment on 02/18/2022 a the [facility] for her 1 year follow-up and she said she will have them recheck it then.     She did not want to schedule another follow-up at this time.     MARTÍNEZ Lauren

## 2022-01-31 NOTE — TELEPHONE ENCOUNTER
The appearance on ultrasound and the small size given it benign appearance. Please make patient a follow up appointment next week for re-evaluation if still sore.  Thanks, JANNETTE Munoz

## 2022-01-31 NOTE — TELEPHONE ENCOUNTER
Please call and let patient know that ultrasound and mammogram ok.  There is a small, benign appearing lymph node in the left axillary region (armpit).  Recommended to keep annual breast cancer screenings with mammogram.  Thanks, JANNETTE Munoz

## 2022-01-31 NOTE — TELEPHONE ENCOUNTER
Per Dr. Munoz, Ms. Pedro has been called with recent Diagnostic Mammogram & Ultrasound results and recommendations.   Continue current medications and follow-up as planned or sooner if any problems.     Patient Concerns:   She wants to know how they know that it is benign?   I did tell her it is they way on appears on the Ultrasound.  I did tell her that if it gets larger or is sore to let us know.   She said that it is still sore and wants to know when she needs to come back and what else needs to be done other than watching it.    I told her I would forward her concerns.     Please Advise  MARTÍNEZ Lauren

## 2022-02-15 DIAGNOSIS — Z85.3 HX: BREAST CANCER: Primary | ICD-10-CM

## 2022-02-18 ENCOUNTER — OFFICE VISIT (OUTPATIENT)
Dept: ONCOLOGY | Facility: CLINIC | Age: 67
End: 2022-02-18

## 2022-02-18 ENCOUNTER — LAB (OUTPATIENT)
Dept: ONCOLOGY | Facility: HOSPITAL | Age: 67
End: 2022-02-18

## 2022-02-18 VITALS
HEART RATE: 69 BPM | SYSTOLIC BLOOD PRESSURE: 189 MMHG | OXYGEN SATURATION: 99 % | WEIGHT: 190.3 LBS | DIASTOLIC BLOOD PRESSURE: 85 MMHG | BODY MASS INDEX: 30.72 KG/M2 | RESPIRATION RATE: 18 BRPM | TEMPERATURE: 96.3 F

## 2022-02-18 DIAGNOSIS — R91.1 LUNG NODULE: Primary | ICD-10-CM

## 2022-02-18 DIAGNOSIS — Z85.3 HX: BREAST CANCER: ICD-10-CM

## 2022-02-18 LAB
ALBUMIN SERPL-MCNC: 4.2 G/DL (ref 3.5–5.2)
ALBUMIN/GLOB SERPL: 1.8 G/DL
ALP SERPL-CCNC: 111 U/L (ref 39–117)
ALT SERPL W P-5'-P-CCNC: 17 U/L (ref 1–33)
ANION GAP SERPL CALCULATED.3IONS-SCNC: 9 MMOL/L (ref 5–15)
AST SERPL-CCNC: 19 U/L (ref 1–32)
BASOPHILS # BLD AUTO: 0.04 10*3/MM3 (ref 0–0.2)
BASOPHILS NFR BLD AUTO: 0.6 % (ref 0–1.5)
BILIRUB SERPL-MCNC: 0.5 MG/DL (ref 0–1.2)
BUN SERPL-MCNC: 14 MG/DL (ref 8–23)
BUN/CREAT SERPL: 17.5 (ref 7–25)
CALCIUM SPEC-SCNC: 9.5 MG/DL (ref 8.6–10.5)
CHLORIDE SERPL-SCNC: 105 MMOL/L (ref 98–107)
CO2 SERPL-SCNC: 26 MMOL/L (ref 22–29)
CREAT SERPL-MCNC: 0.8 MG/DL (ref 0.57–1)
DEPRECATED RDW RBC AUTO: 44 FL (ref 37–54)
EOSINOPHIL # BLD AUTO: 0.13 10*3/MM3 (ref 0–0.4)
EOSINOPHIL NFR BLD AUTO: 2 % (ref 0.3–6.2)
ERYTHROCYTE [DISTWIDTH] IN BLOOD BY AUTOMATED COUNT: 13.4 % (ref 12.3–15.4)
GFR SERPL CREATININE-BSD FRML MDRD: 72 ML/MIN/1.73
GLOBULIN UR ELPH-MCNC: 2.3 GM/DL
GLUCOSE SERPL-MCNC: 116 MG/DL (ref 65–99)
HCT VFR BLD AUTO: 40.2 % (ref 34–46.6)
HGB BLD-MCNC: 13.8 G/DL (ref 12–15.9)
HOLD SPECIMEN: NORMAL
IMM GRANULOCYTES # BLD AUTO: 0.03 10*3/MM3 (ref 0–0.05)
IMM GRANULOCYTES NFR BLD AUTO: 0.5 % (ref 0–0.5)
LYMPHOCYTES # BLD AUTO: 2.23 10*3/MM3 (ref 0.7–3.1)
LYMPHOCYTES NFR BLD AUTO: 33.6 % (ref 19.6–45.3)
MCH RBC QN AUTO: 31 PG (ref 26.6–33)
MCHC RBC AUTO-ENTMCNC: 34.3 G/DL (ref 31.5–35.7)
MCV RBC AUTO: 90.3 FL (ref 79–97)
MONOCYTES # BLD AUTO: 0.48 10*3/MM3 (ref 0.1–0.9)
MONOCYTES NFR BLD AUTO: 7.2 % (ref 5–12)
NEUTROPHILS NFR BLD AUTO: 3.72 10*3/MM3 (ref 1.7–7)
NEUTROPHILS NFR BLD AUTO: 56.1 % (ref 42.7–76)
NRBC BLD AUTO-RTO: 0 /100 WBC (ref 0–0.2)
PLATELET # BLD AUTO: 220 10*3/MM3 (ref 140–450)
PMV BLD AUTO: 10.1 FL (ref 6–12)
POTASSIUM SERPL-SCNC: 4.2 MMOL/L (ref 3.5–5.2)
PROT SERPL-MCNC: 6.5 G/DL (ref 6–8.5)
RBC # BLD AUTO: 4.45 10*6/MM3 (ref 3.77–5.28)
SODIUM SERPL-SCNC: 140 MMOL/L (ref 136–145)
WBC NRBC COR # BLD: 6.63 10*3/MM3 (ref 3.4–10.8)

## 2022-02-18 PROCEDURE — 85025 COMPLETE CBC W/AUTO DIFF WBC: CPT

## 2022-02-18 PROCEDURE — 80053 COMPREHEN METABOLIC PANEL: CPT

## 2022-02-18 PROCEDURE — G0463 HOSPITAL OUTPT CLINIC VISIT: HCPCS | Performed by: NURSE PRACTITIONER

## 2022-02-18 PROCEDURE — 99213 OFFICE O/P EST LOW 20 MIN: CPT | Performed by: NURSE PRACTITIONER

## 2022-02-18 NOTE — PROGRESS NOTES
DATE OF VISIT: 2/18/2022      REASON FOR VISIT:  Yearly follow-up for breast cancer        HISTORY OF PRESENT ILLNESS: 67-year-old female with a past medical history significant for right-sided breast cancer, stage III diagnosed in 2008, status post mastectomy followed by chemotherapy and radiation. She completed 10 yrs of adjuvant hormonal therapy with Tamoxifen followed by Arimidex. She stopped Arimidex in January 2018.   She has a smoking history and on last visit she agreed to lung cancer screening; low dose CT scan showed right lower lobe nodule; follow-up PET/CT scan showed did not favor malignancy, more inflammatory; biopsy was performed and showed no malignant cells and follow-up CT scan in September 2021 was stable; recommend repeating in one year.      Past Medical History, Past Surgical History, Social History, Family History have been reviewed and are without significant changes except as mentioned.    Review of Systems   A comprehensive 14 point review of systems was performed and was negative except as mentioned.    Medications:  The current medication list was reviewed in the EMR    ALLERGIES:    Allergies   Allergen Reactions   • Erythromycin Other (See Comments)     SEVERE WEAKNESS   • Phenergan [Promethazine Hcl] Other (See Comments)     weakness   • Propofol Other (See Comments)     COUGH/WHEEZE  Runny nose   • Tetracyclines & Related GI Intolerance       Objective      Vitals:    02/18/22 1328   BP: (!) 189/85   Pulse: 69   Resp: 18   Temp: 96.3 °F (35.7 °C)   SpO2: 99%   Weight: 86.3 kg (190 lb 4.8 oz)   PainSc:   7     Current Status 2/19/2021   ECOG score 0       Physical Exam  GENERAL:  Not in any distress.     HEENT:  Normocephalic, Atraumatic.Mild Conjunctival pallor. No icterus. No Facial Asymmetry noted.     NECK:  No adenopathy. No JVD.     RESPIRATORY:  Fair air entry bilateral. No rhonchi or wheezing.     CARDIOVASCULAR:  S1, S2. Regular rate and rhythm. No murmur or gallop  appreciated.     ABDOMEN:  Soft, obese, nontender. Bowel sounds present in all four quadrants.  No organomegaly appreciated.     EXTREMITIES:  No edema.No Calf Tenderness.     NEUROLOGIC:  Alert, awake and oriented ×3.      SKIN : No new skin lesion identified    I have reexamined the patient and the results are consistent with the previously documented exam. DAVID Ferguson     RECENT LABS:  Glucose   Date Value Ref Range Status   02/18/2022 116 (H) 65 - 99 mg/dL Final     Sodium   Date Value Ref Range Status   02/18/2022 140 136 - 145 mmol/L Final     Potassium   Date Value Ref Range Status   02/18/2022 4.2 3.5 - 5.2 mmol/L Final     CO2   Date Value Ref Range Status   02/18/2022 26.0 22.0 - 29.0 mmol/L Final     Chloride   Date Value Ref Range Status   02/18/2022 105 98 - 107 mmol/L Final     Anion Gap   Date Value Ref Range Status   02/18/2022 9.0 5.0 - 15.0 mmol/L Final     Creatinine   Date Value Ref Range Status   02/18/2022 0.80 0.57 - 1.00 mg/dL Final     BUN   Date Value Ref Range Status   02/18/2022 14 8 - 23 mg/dL Final     BUN/Creatinine Ratio   Date Value Ref Range Status   02/18/2022 17.5 7.0 - 25.0 Final     Calcium   Date Value Ref Range Status   02/18/2022 9.5 8.6 - 10.5 mg/dL Final     eGFR Non  Amer   Date Value Ref Range Status   02/18/2022 72 >60 mL/min/1.73 Final     Alkaline Phosphatase   Date Value Ref Range Status   02/18/2022 111 39 - 117 U/L Final     Total Protein   Date Value Ref Range Status   02/18/2022 6.5 6.0 - 8.5 g/dL Final     ALT (SGPT)   Date Value Ref Range Status   02/18/2022 17 1 - 33 U/L Final     AST (SGOT)   Date Value Ref Range Status   02/18/2022 19 1 - 32 U/L Final     Total Bilirubin   Date Value Ref Range Status   02/18/2022 0.5 0.0 - 1.2 mg/dL Final     Albumin   Date Value Ref Range Status   02/18/2022 4.20 3.50 - 5.20 g/dL Final     Globulin   Date Value Ref Range Status   02/18/2022 2.3 gm/dL Final     Lab Results   Component Value Date    WBC 6.63  02/18/2022    HGB 13.8 02/18/2022    HCT 40.2 02/18/2022    MCV 90.3 02/18/2022     02/18/2022     Lab Results   Component Value Date    NEUTROABS 3.72 02/18/2022    IRON 42 11/23/2016    TIBC 338 11/23/2016    LABIRON 12.4 (L) 11/23/2016    [id number] 04/29/2021     Lab Results   Component Value Date    LABCA2 22.9 01/10/2017         PATHOLOGY:  * Cannot find OR log *         RADIOLOGY DATA :  No radiology results for the last 7 days        Assessment/Plan     1. Infiltrating ductal carcinoma of right breast, stage III, T2 N2, ER positive ER positive HER-2/ary negative by fish diagnosed on March 10, 2007.  Status post mastectomy on right side with lymph node dissection.  Patient received adjuvant chemotherapy in form of Adriamycin and Cytoxan followed by Taxol.  Subsequently patient received adjuvant radiation therapy .  Patient initially received tamoxifen from 2008 until January 2013.  After that in view of 4 lymph node being positive she was changed over to Arimidex in January 2013 and completed 5 additional yrs of therapy; completed in January 2018. She is currently on yearly observation.      2. Right lower lobe nodule; pt with previous 30 yr pack history, has not smoked in past 10 yrs; on her last visit we discussed lung cancer screening and she was agreeable; CT scan from 2020 showed mixed ground glass area in right lower lobe.  PET/CT scan was obtained March 2020 that was read as;  Right lower lobe 1.7 cm groundglass nodular opacity which has  diminished FDG activity with SUV of 2.3. This lesion is most  consistent with inflammatory changes with neoplastic involvement  not favored.   -CT guided biopsy was negative for any malignant cell and CT in September 2021 was stable  -will repeat CT scan again in September 2022; order placed today and pt is agreeable.                    PHQ-9 Total Score: 0     Humera Jorge Pedro reports a pain score of 7.  Given her pain assessment as noted, treatment  options were discussed and the following options were decided upon as a follow-up plan to address the patient's pain: continuation of current treatment plan for pain.         Ashley Antoine, APRN  2/18/2022  14:29 CST        Part of this note may be an electronic transcription/translation of spoken language to printed text using the Dragon Dictation System.          CC:

## 2022-03-08 ENCOUNTER — OFFICE VISIT (OUTPATIENT)
Dept: FAMILY MEDICINE CLINIC | Facility: CLINIC | Age: 67
End: 2022-03-08

## 2022-03-08 VITALS
OXYGEN SATURATION: 99 % | SYSTOLIC BLOOD PRESSURE: 138 MMHG | WEIGHT: 192.4 LBS | HEIGHT: 66 IN | HEART RATE: 74 BPM | BODY MASS INDEX: 30.92 KG/M2 | DIASTOLIC BLOOD PRESSURE: 82 MMHG | RESPIRATION RATE: 18 BRPM

## 2022-03-08 DIAGNOSIS — M54.12 CERVICAL RADICULOPATHY: ICD-10-CM

## 2022-03-08 DIAGNOSIS — M54.2 NECK PAIN: Primary | ICD-10-CM

## 2022-03-08 PROCEDURE — 99213 OFFICE O/P EST LOW 20 MIN: CPT | Performed by: FAMILY MEDICINE

## 2022-03-08 RX ORDER — GABAPENTIN 100 MG/1
100 CAPSULE ORAL 3 TIMES DAILY
Qty: 30 CAPSULE | Refills: 0 | OUTPATIENT
Start: 2022-03-08 | End: 2022-03-22

## 2022-03-08 NOTE — PROGRESS NOTES
"Chief Complaint  Back Pain    Subjective          Humera Pedro presents to [facility] - [place]  History of Present Illness    Patient presents today for neck pain  She has been having increased pain in her neck, paresthesias in left upper extremity.  Has been going to the chiropractor, not helping enough.  She has been to about 6 sessions over the last 3 weeks consistently.  Using OTC analgesics.  No weakness or dropping items at this time.  Pain interfering with daily activities and sleep.    Objective   Vital Signs:   /82   Pulse 74   Resp 18   Ht 167.6 cm (66\")   Wt 87.3 kg (192 lb 6.4 oz)   SpO2 99%   BMI 31.05 kg/m²     Physical Exam  Vitals reviewed.   Constitutional:       General: She is not in acute distress.     Appearance: She is well-developed.   Cardiovascular:      Rate and Rhythm: Normal rate and regular rhythm.      Heart sounds: Normal heart sounds. No murmur heard.  Pulmonary:      Effort: Pulmonary effort is normal. No respiratory distress.      Breath sounds: Normal breath sounds. No wheezing or rales.   Abdominal:      Palpations: Abdomen is soft.      Tenderness: There is no abdominal tenderness.   Skin:     General: Skin is warm and dry.   Neurological:      Mental Status: She is alert and oriented to person, place, and time.        Result Review :   The following data was reviewed by: Adrienne Munoz MD on 03/08/2022:  Common labs    Common Labsle 4/29/21 2/18/22 2/18/22    1136 1321 1321   Glucose   116 (A)   BUN   14   Creatinine   0.80   eGFR Non African Am   72   Sodium   140   Potassium   4.2   Chloride   105   Calcium   9.5   Albumin   4.20   Total Bilirubin   0.5   Alkaline Phosphatase   111   AST (SGOT)   19   ALT (SGPT)   17   WBC  6.63    Hemoglobin  13.8    Hematocrit  40.2    Platelets  220    Total Cholesterol 227 (A)     Triglycerides 216 (A)     HDL Cholesterol 41     LDL Cholesterol  147 (A)     Hemoglobin A1C    "   (A) Abnormal value                      Assessment and Plan    Diagnoses and all orders for this visit:    1. Neck pain (Primary)  -     XR Spine Cervical Complete 4 or 5 View; Future  -     Ambulatory Referral to Physical Therapy Evaluate and treat  -     gabapentin (NEURONTIN) 100 MG capsule; Take 1 capsule by mouth 3 (Three) Times a Day.  Dispense: 30 capsule; Refill: 0    2. Cervical radiculopathy  -     XR Spine Cervical Complete 4 or 5 View; Future  -     Ambulatory Referral to Physical Therapy Evaluate and treat  -     gabapentin (NEURONTIN) 100 MG capsule; Take 1 capsule by mouth 3 (Three) Times a Day.  Dispense: 30 capsule; Refill: 0      Patient seen for neck pain  Having radiculopathy symptoms  Check Xray cervical spine  Recommend physical therapy - referral placed.    Try gabapentin to help with pain  Needs MRI due to failed initial conservative measures with medication and chiropractor          Follow Up   Return in about 5 weeks (around 4/12/2022) for Recheck.  Patient was given instructions and counseling regarding her condition or for health maintenance advice. Please see specific information pulled into the AVS if appropriate.           This document has been electronically signed by Adrienne Munoz MD

## 2022-03-10 ENCOUNTER — OFFICE VISIT (OUTPATIENT)
Dept: FAMILY MEDICINE CLINIC | Facility: CLINIC | Age: 67
End: 2022-03-10

## 2022-03-10 ENCOUNTER — APPOINTMENT (OUTPATIENT)
Dept: PHYSICAL THERAPY | Facility: HOSPITAL | Age: 67
End: 2022-03-10

## 2022-03-10 VITALS
HEART RATE: 70 BPM | WEIGHT: 190.6 LBS | HEIGHT: 66 IN | TEMPERATURE: 98 F | SYSTOLIC BLOOD PRESSURE: 111 MMHG | BODY MASS INDEX: 30.63 KG/M2 | DIASTOLIC BLOOD PRESSURE: 71 MMHG | OXYGEN SATURATION: 98 %

## 2022-03-10 DIAGNOSIS — J06.9 UPPER RESPIRATORY TRACT INFECTION, UNSPECIFIED TYPE: ICD-10-CM

## 2022-03-10 PROCEDURE — 99213 OFFICE O/P EST LOW 20 MIN: CPT | Performed by: NURSE PRACTITIONER

## 2022-03-10 PROCEDURE — 96372 THER/PROPH/DIAG INJ SC/IM: CPT | Performed by: NURSE PRACTITIONER

## 2022-03-10 RX ORDER — GUAIFENESIN 600 MG/1
1200 TABLET, EXTENDED RELEASE ORAL 2 TIMES DAILY
Qty: 28 TABLET | Refills: 0 | OUTPATIENT
Start: 2022-03-10 | End: 2022-03-22

## 2022-03-10 RX ORDER — PREDNISONE 20 MG/1
TABLET ORAL
Qty: 7 TABLET | Refills: 0 | OUTPATIENT
Start: 2022-03-10 | End: 2022-03-22

## 2022-03-10 RX ORDER — TRIAMCINOLONE ACETONIDE 40 MG/ML
40 INJECTION, SUSPENSION INTRA-ARTICULAR; INTRAMUSCULAR ONCE
Status: COMPLETED | OUTPATIENT
Start: 2022-03-10 | End: 2022-03-10

## 2022-03-10 RX ADMIN — TRIAMCINOLONE ACETONIDE 40 MG: 40 INJECTION, SUSPENSION INTRA-ARTICULAR; INTRAMUSCULAR at 15:29

## 2022-03-10 NOTE — PROGRESS NOTES
CC: earache, nasal congestion, and back pain    History:  Humera Pedro is a 67 y.o. female who presents today for evaluation of the above problems.      Symptoms started yesterday, however, patient has a history of breast cancer and worries about becoming more sick.   Complains of a slight ear ache.  Is currently having pain in her shoulder and neck that is being evaluated by her PCP. Gabapentin was ordered by her PCP and she has been taking this at night.       HPI  ROS:  Review of Systems   HENT: Positive for congestion and ear pain.    Musculoskeletal: Positive for neck pain.       Allergies   Allergen Reactions   • Erythromycin Other (See Comments)     SEVERE WEAKNESS   • Phenergan [Promethazine Hcl] Other (See Comments)     weakness   • Propofol Other (See Comments)     COUGH/WHEEZE  Runny nose   • Tetracyclines & Related GI Intolerance     Past Medical History:   Diagnosis Date   • Acquired equinus deformity of foot     ANKLE   • Anxiety    • Breast cancer (HCC)    • Depression    • Diverticular disease of colon    • Esophagitis    • GERD (gastroesophageal reflux disease)    • Hx of radiation therapy    • Hyperlipidemia    • Plantar fasciitis    • Primary fibromyalgia syndrome    • Solitary pulmonary nodule present on computed tomography of lung      Past Surgical History:   Procedure Laterality Date   • BREAST SURGERY      Carcinoma of the right breast;Mastectomy   •  SECTION     • CHOLECYSTECTOMY WITH INTRAOPERATIVE CHOLANGIOGRAM N/A 2018    Procedure: LAPAROSCOPIC POSSIBLE OPEN CHOLECYSTECTOMY WITH INTRAOPERATIVE CHOLANGIOGRAM    (C-Arm # 1);  Surgeon: Dirk Leigh MD;  Location: [facility] OR;  Service: General   • COLONOSCOPY  2016    Normal colon.No specimens collected   • COLONOSCOPY N/A 3/19/2021    Procedure: COLONOSCOPY;  Surgeon: Brooks Reinoso MD;  Location: [facility] ENDOSCOPY;  Service: Gastroenterology;  Laterality: N/A;   • DIAGNOSTIC LAPAROSCOPY  1977    (Amenorrhea,  probable polycystic ovaries. Polycystic ovaries   • ENDOSCOPY N/A 8/20/2019    Procedure: ESOPHAGOGASTRODUODENOSCOPY possible dilation;  Surgeon: Brooks Reinoso MD;  Location: [facility] ENDOSCOPY;  Service: Gastroenterology   • ENDOSCOPY N/A 3/19/2021    Procedure: ESOPHAGOGASTRODUODENOSCOPY;  Surgeon: Brooks Reinoso MD;  Location: [facility] ENDOSCOPY;  Service: Gastroenterology;  Laterality: N/A;   • ESOPHAGOSCOPY / EGD  02/22/2016    Mildly severe esophagitis.Gastritis.Normal examined duodenum.Medium sized hiatus hernia   • EXCISION BREAST LESION W/ PREOP NEEDLE LOC  06/17/1987    Bilateral excision of breast masses. Bilateral breast masses; probable fibrocystic disease.   • HYSTEROSCOPY  12/13/2011    Exam under anesthesia, diagnostic hysterectomy with fractional dilation and curettage. Thickened endometrial stripe, on Tamoxifen therapy.   • OTHER SURGICAL HISTORY  02/12/2016    NEEDLE BIOPSY LYMPH NODES; Ultrasound directed core needle biopsy of the left axilla.     Family History   Problem Relation Age of Onset   • Diabetes Other    • Arthritis Other    • Breast cancer Maternal Aunt       reports that she has quit smoking. She has never used smokeless tobacco. She reports that she does not drink alcohol and does not use drugs.      Current Outpatient Medications:   •  cholestyramine (QUESTRAN) 4 g packet, Take 1 packet by mouth., Disp: , Rfl:   •  citalopram (CeleXA) 20 MG tablet, Take 1 tablet by mouth Daily., Disp: 90 tablet, Rfl: 3  •  clobetasol (TEMOVATE) 0.05 % external solution, Apply  topically to the appropriate area as directed 2 (Two) Times a Day., Disp: 50 mL, Rfl: 2  •  Dexilant 60 MG capsule, TAKE 1 CAPSULE BY MOUTH EVERY DAY, Disp: 90 capsule, Rfl: 0  •  dicyclomine (BENTYL) 10 MG capsule, TAKE 1 CAPSULE BY MOUTH 3 TIMES A DAY AS NEEDED FOR CRAMPING AND DIARRHEA, Disp: 90 capsule, Rfl: 1  •  fluticasone (FLONASE) 50 MCG/ACT nasal spray, 2 sprays into the nostril(s) as directed by provider Daily As  "Needed for Rhinitis., Disp: , Rfl:   •  gabapentin (NEURONTIN) 100 MG capsule, Take 1 capsule by mouth 3 (Three) Times a Day., Disp: 30 capsule, Rfl: 0  •  guaiFENesin (Mucinex) 600 MG 12 hr tablet, Take 2 tablets by mouth 2 (Two) Times a Day., Disp: 28 tablet, Rfl: 0  •  lisinopril (PRINIVIL,ZESTRIL) 10 MG tablet, Take 1 tablet by mouth Daily., Disp: 90 tablet, Rfl: 3  •  Multiple Vitamins-Minerals (WOMENS MULTI PO), Take 1 tablet by mouth Daily., Disp: , Rfl:   •  nitrofurantoin (MACRODANTIN) 100 MG capsule, Take 100 mg by mouth Daily., Disp: , Rfl:   •  Phenylephrine-DM-GG-APAP (TYLENOL COLD/FLU SEVERE PO), Take  by mouth., Disp: , Rfl:   •  vitamin B-12 (CYANOCOBALAMIN) 1000 MCG tablet, Take 1,000 mcg by mouth Daily., Disp: , Rfl:   •  albuterol sulfate  (90 Base) MCG/ACT inhaler, , Disp: , Rfl:   •  atorvastatin (LIPITOR) 20 MG tablet, TAKE 1 TABLET BY MOUTH EVERY DAY, Disp: 90 tablet, Rfl: 1  •  ketoconazole (NIZORAL) 2 % shampoo, , Disp: , Rfl:   •  predniSONE (DELTASONE) 20 MG tablet, Take two tablets daily for two days and then one daily until gone., Disp: 7 tablet, Rfl: 0  •  triamcinolone (KENALOG) 0.1 % cream, APPLY A THIN LAYER TO THE AFFECTED AREA(S) ON HANDS BY TOPICAL ROUTE 2 TIMES PER DAY FOR ONE WEEK, Disp: , Rfl:   No current facility-administered medications for this visit.    OBJECTIVE:  /71 (BP Location: Left arm, Patient Position: Sitting, Cuff Size: Large Adult)   Pulse 70   Temp 98 °F (36.7 °C) (Temporal)   Ht 167.6 cm (66\")   Wt 86.5 kg (190 lb 9.6 oz)   SpO2 98%   Breastfeeding No   BMI 30.76 kg/m²    Physical Exam  Vitals reviewed.   Constitutional:       Appearance: She is well-developed.   HENT:      Right Ear: Tympanic membrane, ear canal and external ear normal.      Left Ear: Tympanic membrane, ear canal and external ear normal.   Cardiovascular:      Rate and Rhythm: Normal rate and regular rhythm.      Heart sounds: Normal heart sounds.   Pulmonary:      Effort: " Pulmonary effort is normal.      Breath sounds: Normal breath sounds.   Neurological:      Mental Status: She is alert and oriented to person, place, and time.   Psychiatric:         Behavior: Behavior normal.         Assessment/Plan    Diagnoses and all orders for this visit:    1. Upper respiratory tract infection, unspecified type  -     guaiFENesin (Mucinex) 600 MG 12 hr tablet; Take 2 tablets by mouth 2 (Two) Times a Day.  Dispense: 28 tablet; Refill: 0  -     triamcinolone acetonide (KENALOG-40) injection 40 mg  -     predniSONE (DELTASONE) 20 MG tablet; Take two tablets daily for two days and then one daily until gone.  Dispense: 7 tablet; Refill: 0    Advised that she take the gabapentin during the day also to help with neck pain since it is ordered that it may be taken three times a day.     An After Visit Summary was printed and given to the patient at discharge.  Return if symptoms worsen or fail to improve, for Next scheduled follow up.       DAVID Serrano 3/10/22    Electronically signed.

## 2022-03-14 ENCOUNTER — TELEPHONE (OUTPATIENT)
Dept: FAMILY MEDICINE CLINIC | Facility: CLINIC | Age: 67
End: 2022-03-14

## 2022-03-14 DIAGNOSIS — M54.12 CERVICAL RADICULOPATHY: Primary | ICD-10-CM

## 2022-03-14 DIAGNOSIS — J06.9 UPPER RESPIRATORY TRACT INFECTION, UNSPECIFIED TYPE: Primary | ICD-10-CM

## 2022-03-14 DIAGNOSIS — M54.2 NECK PAIN: ICD-10-CM

## 2022-03-14 RX ORDER — CEFDINIR 300 MG/1
300 CAPSULE ORAL 2 TIMES DAILY
Qty: 14 CAPSULE | Refills: 0 | OUTPATIENT
Start: 2022-03-14 | End: 2022-03-22

## 2022-03-14 NOTE — TELEPHONE ENCOUNTER
Caller: Humera Pedro    Relationship: Self    Best call back number:[phone]    What medication are you requesting: ANTIBIOTIC     What are your current symptoms: CONGESTION IN EARS AND THROAT     How long have you been experiencing symptoms: ABOUT A WEEK     Have you had these symptoms before:    [x] Yes  [] No    Have you been treated for these symptoms before:   [x] Yes  [] No    If a prescription is needed, what is your preferred pharmacy and phone number: [facility]/PHARMACY #[id number] - [place], [place] - [address] [phone] [facility] [phone]

## 2022-03-14 NOTE — TELEPHONE ENCOUNTER
LVM for patient letting her know she would need to have an appt to be seen by Xochitl since Dr. Cardenas is out of office this week.

## 2022-03-15 ENCOUNTER — APPOINTMENT (OUTPATIENT)
Dept: PHYSICAL THERAPY | Facility: HOSPITAL | Age: 67
End: 2022-03-15

## 2022-03-15 NOTE — TELEPHONE ENCOUNTER
Please let patient know that Xray shows degenerative disease and narrowing on the left and right side spaces - may connor contributing to symptoms. MRI has been ordered, she will be contacted for scheduling.  Chad Munoz

## 2022-03-15 NOTE — TELEPHONE ENCOUNTER
Per Dr. Munzo, Ms. Pedro has been called with recent x-ray results and recommendations.   Continue current medications and follow-up as planned or sooner if any problems.

## 2022-03-22 ENCOUNTER — APPOINTMENT (OUTPATIENT)
Dept: PHYSICAL THERAPY | Facility: HOSPITAL | Age: 67
End: 2022-03-22

## 2022-03-22 ENCOUNTER — TELEPHONE (OUTPATIENT)
Dept: FAMILY MEDICINE CLINIC | Facility: CLINIC | Age: 67
End: 2022-03-22

## 2022-03-22 DIAGNOSIS — E78.5 HYPERLIPIDEMIA, UNSPECIFIED HYPERLIPIDEMIA TYPE: Primary | ICD-10-CM

## 2022-03-22 RX ORDER — ATORVASTATIN CALCIUM 20 MG/1
TABLET, FILM COATED ORAL
Qty: 90 TABLET | Refills: 1 | OUTPATIENT
Start: 2022-03-22 | End: 2022-03-22

## 2022-03-22 NOTE — TELEPHONE ENCOUNTER
Caller: Mayela Humera L    Relationship: Self    Best call back number: [phone]    What medication are you requesting:      Something to treat    What are your current symptoms:      Sore throat, congestion, headache and drainage    How long have you been experiencing symptoms:     Ongoing since last seen    Have you had these symptoms before:    [x] Yes  [] No    Have you been treated for these symptoms before:   [x] Yes  [] No    If a prescription is needed, what is your preferred pharmacy and phone number:        [facility]/pharmacy #[id number] - [address] [phone] [facility] [phone] FX     Additional notes:     Patient finished the antibiotic that was prescribed, but symptoms persist.

## 2022-03-25 ENCOUNTER — TELEPHONE (OUTPATIENT)
Dept: FAMILY MEDICINE CLINIC | Facility: CLINIC | Age: 67
End: 2022-03-25

## 2022-03-25 DIAGNOSIS — M54.12 CERVICAL RADICULOPATHY: Primary | ICD-10-CM

## 2022-03-25 RX ORDER — PREGABALIN 25 MG/1
25 CAPSULE ORAL DAILY
Qty: 30 CAPSULE | Refills: 0 | Status: SHIPPED | OUTPATIENT
Start: 2022-03-25 | End: 2022-03-25

## 2022-03-25 NOTE — TELEPHONE ENCOUNTER
We can try lyrica, 25 mg daily, but this may have the same effect.  I have sent medication to the pharmacy.  Physical therapy is going to be the best option for helping, so I think she will see improvement once this starts.  Thanks, JANNETTE Munoz

## 2022-03-25 NOTE — TELEPHONE ENCOUNTER
Pt called and stated that she was prescribed gabapentin by Dr. Munoz fro her back. She says that the gabapentin is making her feel sick and making her throat burn. She would like to try something else.     Also wanted to inform Tammy that her Physical Therapy has not started yet due to her not feeling well, but that she should be starting next week.

## 2022-03-25 NOTE — TELEPHONE ENCOUNTER
Per Dr. Munoz, Ms Pedro has been called with recommendations.   She said she could not take Lyrica, she had tried this in the past.   So she feels that is probably why she could not take the Gabapentin.     She wants to know is there not anything else that she can take for the pain?     She wanted to know about taking Elavil?    She said she has taken this before but she said the pain is getting so bad and hard to handle.     She did not know if she could take the Elavil with the other medications she is on.   She has not been able to start PT yet due to her not feeling well but does plan to start next week.

## 2022-03-29 ENCOUNTER — HOSPITAL ENCOUNTER (OUTPATIENT)
Dept: PHYSICAL THERAPY | Facility: HOSPITAL | Age: 67
Setting detail: THERAPIES SERIES
Discharge: HOME | End: 2022-03-29

## 2022-03-29 DIAGNOSIS — M54.12 CERVICAL RADICULOPATHY: Primary | ICD-10-CM

## 2022-03-29 PROCEDURE — 97161 PT EVAL LOW COMPLEX 20 MIN: CPT | Performed by: PHYSICAL THERAPIST

## 2022-03-30 RX ORDER — CHOLESTYRAMINE 4 G/9G
POWDER, FOR SUSPENSION ORAL
Qty: 180 PACKET | Refills: 3 | OUTPATIENT
Start: 2022-03-30

## 2022-03-30 NOTE — THERAPY EVALUATION
Outpatient Physical Therapy Ortho Initial Evaluation  [facility]     Patient Name: Humera Pedro  : 1955  MRN: [id number]  Today's Date: 3/30/2022      Visit Date: 2022  Attendance:    Subjective % Improvement: n/a  Recert Date: 22  MD appointment: tbd    Therapy Diagnosis: Left shoulder/neck pain    Patient Active Problem List   Diagnosis   • HX: breast cancer   • Gastroesophageal reflux disease with esophagitis   • Uterine prolapse   • Change in bowel habits   • Diarrhea   • Generalized abdominal pain        Past Medical History:   Diagnosis Date   • Acquired equinus deformity of foot     ANKLE   • Anxiety    • Breast cancer (HCC)    • Depression    • Diverticular disease of colon    • Esophagitis    • GERD (gastroesophageal reflux disease)    • Hx of radiation therapy    • Hyperlipidemia    • Plantar fasciitis    • Primary fibromyalgia syndrome    • Solitary pulmonary nodule present on computed tomography of lung         Past Surgical History:   Procedure Laterality Date   • BREAST SURGERY      Carcinoma of the right breast;Mastectomy   •  SECTION     • CHOLECYSTECTOMY WITH INTRAOPERATIVE CHOLANGIOGRAM N/A 2018    Procedure: LAPAROSCOPIC POSSIBLE OPEN CHOLECYSTECTOMY WITH INTRAOPERATIVE CHOLANGIOGRAM    (C-Arm # 1);  Surgeon: Dirk Leigh MD;  Location: [facility] OR;  Service: General   • COLONOSCOPY  2016    Normal colon.No specimens collected   • COLONOSCOPY N/A 3/19/2021    Procedure: COLONOSCOPY;  Surgeon: Brooks Reinoso MD;  Location: [facility] ENDOSCOPY;  Service: Gastroenterology;  Laterality: N/A;   • DIAGNOSTIC LAPAROSCOPY  1977    (Amenorrhea, probable polycystic ovaries. Polycystic ovaries   • ENDOSCOPY N/A 2019    Procedure: ESOPHAGOGASTRODUODENOSCOPY possible dilation;  Surgeon: Brooks Reinoso MD;  Location: [facility] ENDOSCOPY;  Service: Gastroenterology   • ENDOSCOPY N/A 3/19/2021    Procedure: ESOPHAGOGASTRODUODENOSCOPY;  Surgeon: Arleth  Brooks VILLEGAS MD;  Location: [facility] ENDOSCOPY;  Service: Gastroenterology;  Laterality: N/A;   • ESOPHAGOSCOPY / EGD  02/22/2016    Mildly severe esophagitis.Gastritis.Normal examined duodenum.Medium sized hiatus hernia   • EXCISION BREAST LESION W/ PREOP NEEDLE LOC  06/17/1987    Bilateral excision of breast masses. Bilateral breast masses; probable fibrocystic disease.   • HYSTEROSCOPY  12/13/2011    Exam under anesthesia, diagnostic hysterectomy with fractional dilation and curettage. Thickened endometrial stripe, on Tamoxifen therapy.   • OTHER SURGICAL HISTORY  02/12/2016    NEEDLE BIOPSY LYMPH NODES; Ultrasound directed core needle biopsy of the left axilla.       Visit Dx:     ICD-10-CM ICD-9-CM   1. Cervical radiculopathy  M54.12 723.4          Patient History     Row Name 03/29/22 1400             History    Brief Description of Current Complaint Present today with low back pain. Has tried Chiropractor without improvements. Has MRI 4/6/22. Sitting is more painful than standing.Notes picking up grandaughter around [employer] and pulled back. Notes twisting to the rigth and since that time more pain. Pain noted of mid to upper pain, neck and left arm. Has noted to have tingling in face.  -BB      Patient/Caregiver Goals Relieve pain  -BB      Patient's Rating of General Health Very good  -BB      Hand Dominance right-handed  -BB              Pain     Pain Location Arm;Back  -BB      Pain at Present 7  shoulder pain  -BB      Pain Comments tylenol does not help  -BB      Is your sleep disturbed? No  -BB            User Key  (r) = Recorded By, (t) = Taken By, (c) = Cosigned By    Initials Name Provider Type    Brenda Golden PT DPT Physical Therapist                 PT Ortho     Row Name 03/29/22 1400       Subjective Comments    Subjective Comments see pt hx  -BB       Precautions and Contraindications    Precautions hx of breast CA  -BB       Subjective Pain    Able to rate subjective pain? yes  -BB     "Pre-Treatment Pain Level 7  -BB       Posture/Observations    Posture/Observations Comments slight rounded shoulder posture noted  -BB       Special Tests/Palpation    Special Tests/Palpation Cervical/Thoracic  -BB       Cervical Palpation    Cervical Palpation- Location? Levator scapula;Upper traps;Rhomboids;Scalenes;Ribs  -BB    Scalenes Tender;Guarded/taut  -BB    Levator Scapula Tender;Guarded/taut  -BB    Upper Traps Tender;Guarded/taut  -BB    Rhomboids Tender;Guarded/taut  -BB    Ribs Tender  slight first rib elevation  -BB       Rib Mobility    Rib Mobility Accessory Motions Tested? Yes  elevated first rib noted  -BB       Cervical/Thoracic Special Tests    Cervical Compression (Forarminal Compression vs. Facet Pain) Negative  -BB    1st Rib Mobility (TOS) Positive  -BB    Adson's Maneuver (TOS) Negative  -BB    Shae's Test (TOS) Negative  -BB    Phalen's Test (Carpal Tunnel Syndrome) Negative  -BB       General ROM    GENERAL ROM COMMENTS Full AROM of shoulder with pain end range. Decreased left cspine rotation that per patient is \"normal\", right rotation is WNL  -BB       MMT (Manual Muscle Testing)    General MMT Comments Grossly 4+/5 of shoulder  -BB       Sensation    Sensation WNL? WNL  -BB    Light Touch No apparent deficits  -BB    Additional Comments reports radicular symptoms to elbow  -BB          User Key  (r) = Recorded By, (t) = Taken By, (c) = Cosigned By    Initials Name Provider Type    BB Brenda Rick, PT DPT Physical Therapist                                   PT OP Goals     Row Name 03/29/22 1400          PT Short Term Goals    STG Date to Achieve --  -BB            Long Term Goals    LTG Date to Achieve 05/11/22  -BB     LTG 1 Improve resting pain to 2/10 or better  -BB     LTG 2 Full AROM of shoulders without increased pain in scapular region  -BB     LTG 3 Sitting tolerance improved subjectively to no limits  -BB            Time Calculation    PT Goal Re-Cert Due Date 04/19/22  -BB  "          User Key  (r) = Recorded By, (t) = Taken By, (c) = Cosigned By    Initials Name Provider Type    Brenda Golden PT DPT Physical Therapist                 PT Assessment/Plan     Row Name 03/29/22 1400          PT Assessment    Functional Limitations Limitations in functional capacity and performance;Limitation in home management;Performance in leisure activities  -BB     Impairments Pain;Poor body mechanics;Range of motion;Posture;Muscle strength;Impaired muscle endurance  -BB     Assessment Comments Patient presents today with reports of left sided neck/shoulder blade pain that is worse with sitting with pressure against shoulder blade area the most with reports of radicular symptoms to the elbow. Patient was noted to have significant tension and taut bands of scalenes (posterior most), LS and UT with elevated first rib. Treatment to address deficits improved reported symptoms this date. HEP for stretching given. Patient could continue to benefit from skilled PT to improve deficits and pain.  -BB     Rehab Potential Good  -BB     Patient/caregiver participated in establishment of treatment plan and goals Yes  -BB     Patient would benefit from skilled therapy intervention Yes  -BB            PT Plan    PT Frequency 2x/week  -BB     Predicted Duration of Therapy Intervention (PT) 4-6 weeks  -BB     Planned CPT's? PT THER PROC EA 15 MIN: 90181;PT THER ACT EA 15 MIN: 71916;PT MANUAL THERAPY EA 15 MIN: 91415;PT THER SUPP EA 15 MIN;PT SELF CARE/HOME MGMT/TRAIN EA 15: 28237;PT GAIT TRAINING EA 15 MIN: 70292;PT NEUROMUSC RE-EDUCATION EA 15 MIN: 47203;PT EVAL LOW COMPLEXITY: 60862;PT RE-EVAL: 04533  -BB     PT Plan Comments Monitor first rib, manual therapy to muscles on the neck, ROM of cervical spine and shoulder  -BB           User Key  (r) = Recorded By, (t) = Taken By, (c) = Cosigned By    Initials Name Provider Type    Brenda Golden, PT DPT Physical Therapist                   OP Exercises     Row  "Name 03/29/22 1400             Subjective Comments    Subjective Comments see pt hx  -BB              Subjective Pain    Able to rate subjective pain? yes  -BB      Pre-Treatment Pain Level 7  -BB      Post-Treatment Pain Level --  \"feels better\"  -BB              Exercise 1    Exercise Name 1 1st rib mobe  -BB      Time 1 4x5\" oscillation  -BB              Exercise 2    Exercise Name 2 Gentle passive Scalene stretch and TrP to scalene posterior and LS  -BB      Time 2 5'  -BB              Exercise 3    Exercise Name 3 HEP for 1st rib stretch and LS/UT stretch  -BB            User Key  (r) = Recorded By, (t) = Taken By, (c) = Cosigned By    Initials Name Provider Type    Brenda Golden, PT DPT Physical Therapist                                        Time Calculation:     Start Time: 1400  Stop Time: 1435  Time Calculation (min): 35 min     Therapy Charges for Today     Code Description Service Date Service Provider Modifiers Qty    81149225974  PT EVAL LOW COMPLEXITY 3 3/29/2022 Brenda Rick, PT DPT GP 1                    Brenda Rick PT DPT  3/30/2022      "

## 2022-03-31 ENCOUNTER — APPOINTMENT (OUTPATIENT)
Dept: PHYSICAL THERAPY | Facility: HOSPITAL | Age: 67
End: 2022-03-31

## 2022-04-04 ENCOUNTER — TELEPHONE (OUTPATIENT)
Dept: FAMILY MEDICINE CLINIC | Facility: CLINIC | Age: 67
End: 2022-04-04

## 2022-04-04 NOTE — TELEPHONE ENCOUNTER
Patient called and left a message on 5[address] floors answering machine that she needed to talk to Dr Munoz's nurse because she had a question regarding a MRI. Phone is [phone].

## 2022-04-05 ENCOUNTER — HOSPITAL ENCOUNTER (OUTPATIENT)
Dept: PHYSICAL THERAPY | Facility: HOSPITAL | Age: 67
Setting detail: THERAPIES SERIES
Discharge: HOME | End: 2022-04-05

## 2022-04-05 DIAGNOSIS — M54.12 CERVICAL RADICULOPATHY: Primary | ICD-10-CM

## 2022-04-05 PROCEDURE — 97110 THERAPEUTIC EXERCISES: CPT

## 2022-04-05 PROCEDURE — 97140 MANUAL THERAPY 1/> REGIONS: CPT

## 2022-04-05 NOTE — TELEPHONE ENCOUNTER
Called and spoke to patient-she was needing to know the name of the facility where MRI was being performed. Gave her name and phone number.

## 2022-04-05 NOTE — THERAPY TREATMENT NOTE
Outpatient Physical Therapy Ortho Treatment Note  [facility]     Patient Name: Humera Pedro  : 1955  MRN: [id number]  Today's Date: 2022      Visit Date: 2022  Subjective Improvement: n/a  MD visit: MARIXA  Visit Number: 2/3  Total Approved:12  Recert Date: 2022  Visit Dx:    ICD-10-CM ICD-9-CM   1. Cervical radiculopathy  M54.12 723.4       Patient Active Problem List   Diagnosis   • HX: breast cancer   • Gastroesophageal reflux disease with esophagitis   • Uterine prolapse   • Change in bowel habits   • Diarrhea   • Generalized abdominal pain        Past Medical History:   Diagnosis Date   • Acquired equinus deformity of foot     ANKLE   • Anxiety    • Breast cancer (HCC)    • Depression    • Diverticular disease of colon    • Esophagitis    • GERD (gastroesophageal reflux disease)    • Hx of radiation therapy    • Hyperlipidemia    • Plantar fasciitis    • Primary fibromyalgia syndrome    • Solitary pulmonary nodule present on computed tomography of lung         Past Surgical History:   Procedure Laterality Date   • BREAST SURGERY      Carcinoma of the right breast;Mastectomy   •  SECTION     • CHOLECYSTECTOMY WITH INTRAOPERATIVE CHOLANGIOGRAM N/A 2018    Procedure: LAPAROSCOPIC POSSIBLE OPEN CHOLECYSTECTOMY WITH INTRAOPERATIVE CHOLANGIOGRAM    (C-Arm # 1);  Surgeon: Dirk Leigh MD;  Location: [facility] OR;  Service: General   • COLONOSCOPY  2016    Normal colon.No specimens collected   • COLONOSCOPY N/A 3/19/2021    Procedure: COLONOSCOPY;  Surgeon: Brooks Reinoso MD;  Location: [facility] ENDOSCOPY;  Service: Gastroenterology;  Laterality: N/A;   • DIAGNOSTIC LAPAROSCOPY  1977    (Amenorrhea, probable polycystic ovaries. Polycystic ovaries   • ENDOSCOPY N/A 2019    Procedure: ESOPHAGOGASTRODUODENOSCOPY possible dilation;  Surgeon: Brooks Reinoso MD;  Location: [facility] ENDOSCOPY;  Service: Gastroenterology   • ENDOSCOPY N/A 3/19/2021    Procedure:  ESOPHAGOGASTRODUODENOSCOPY;  Surgeon: Brooks Reinoso MD;  Location: [facility] ENDOSCOPY;  Service: Gastroenterology;  Laterality: N/A;   • ESOPHAGOSCOPY / EGD  02/22/2016    Mildly severe esophagitis.Gastritis.Normal examined duodenum.Medium sized hiatus hernia   • EXCISION BREAST LESION W/ PREOP NEEDLE LOC  06/17/1987    Bilateral excision of breast masses. Bilateral breast masses; probable fibrocystic disease.   • HYSTEROSCOPY  12/13/2011    Exam under anesthesia, diagnostic hysterectomy with fractional dilation and curettage. Thickened endometrial stripe, on Tamoxifen therapy.   • OTHER SURGICAL HISTORY  02/12/2016    NEEDLE BIOPSY LYMPH NODES; Ultrasound directed core needle biopsy of the left axilla.        PT Ortho     Row Name 04/05/22 1500       Subjective Comments    Subjective Comments Pt reported that she is hurting. Pt reports hurting worse. Pt is getting MRI tomorrow. Pt reports she has been doing the stretches. Pt not taking anything for pain. Pt reports that she has been lifting groceries and doing some gardening.  -TL       Precautions and Contraindications    Precautions hx of breast CA  -TL       Subjective Pain    Able to rate subjective pain? yes  -TL    Pre-Treatment Pain Level 8  -TL       Posture/Observations    Posture/Observations Comments pt with left shld hiked , forward shouder,  -TL          User Key  (r) = Recorded By, (t) = Taken By, (c) = Cosigned By    Initials Name Provider Type    TL Ashley Kaur PTA Physical Therapist Assistant                             PT Assessment/Plan     Row Name 04/05/22 1600          PT Assessment    Assessment Comments Pt has MRI done tomorrow. Instructed pt to get results and bring them in as she is able to get them. PT with elevated left shld with forward rounded shld. Pt with taut muscle with upper trap, levator, pec major, teres. Pt posturing some better after therapy. Pt did having tingling down arm with 1st rib mob using strap.  Pt limited with  SB to the left with c-spine and rotation to the left.  No goals met at this time.  See educational tab for HEP , posturing with shld and neck.  -TL            PT Plan    PT Frequency 2x/week  -TL     Predicted Duration of Therapy Intervention (PT) 4-6 weeks  -TL     PT Plan Comments See if MRI result are in by patient.  Continue with manual .  -TL           User Key  (r) = Recorded By, (t) = Taken By, (c) = Cosigned By    Initials Name Provider Type    Ashley García PTA Physical Therapist Assistant                 Modalities     Row Name 04/05/22 1500             Moist Heat    MH Applied Yes  -TL      Location upper trap/cspine  -TL      MH Prior to Rx Yes  8 mins  -TL              Ice    Ice Applied Yes  -TL      Location upper trap left  -TL      Ice S/P Rx Yes  -TL      Patient denies application of Ice --  10 mins  -TL            User Key  (r) = Recorded By, (t) = Taken By, (c) = Cosigned By    Initials Name Provider Type    Ashley García PTA Physical Therapist Assistant               OP Exercises     Row Name 04/05/22 1500             Subjective Comments    Subjective Comments Pt reported that she is hurting. Pt reports hurting worse. Pt is getting MRI tomorrow. Pt reports she has been doing the stretches. Pt not taking anything for pain. Pt reports that she has been lifting groceries and doing some gardening.  -TL              Subjective Pain    Able to rate subjective pain? yes  -TL      Pre-Treatment Pain Level 8  -TL      Post-Treatment Pain Level 7  -TL              Exercise 1    Exercise Name 1 moist heat  -TL      Time 1 8 mins  -TL              Exercise 2    Exercise Name 2 levator S  -TL      Sets 2 2  -TL      Reps 2 30 sec hold  -TL              Exercise 3    Exercise Name 3 1 st rib S with strap self mob  -TL      Sets 3 2  -TL      Time 3 15 sec hold  -TL      Additional Comments pt feeling tingling sesation down the arm  -TL              Exercise 4    Exercise Name 4 upper trap S   -TL      Sets 4 2  -TL      Time 4 30 sec hold  -TL              Exercise 5    Exercise Name 5 shld depression  -TL      Reps 5 10  -TL              Exercise 6    Exercise Name 6 shld rolls backwards  -TL      Reps 6 10  -TL              Exercise 7    Exercise Name 7 shld squeezes  -TL      Reps 7 10  -TL              Exercise 8    Exercise Name 8 see manual  -TL            User Key  (r) = Recorded By, (t) = Taken By, (c) = Cosigned By    Initials Name Provider Type    Ashley García PTA Physical Therapist Assistant                         Manual Rx (last 36 hours)     Manual Treatments     Row Name 04/05/22 1500             Manual Rx 1    Manual Rx 1 Location levator  -TL      Manual Rx 1 Grade manual s, pressure hold  -TL              Manual Rx 2    Manual Rx 2 Location Upper trap  -TL      Manual Rx 2 Grade opposing thumbs, pressure hold  -TL              Manual Rx 3    Manual Rx 3 Location pec major  -TL      Manual Rx 3 Grade opposing thumbs  -TL              Manual Rx 4    Manual Rx 4 Location teres  -TL      Manual Rx 4 Grade pressure hold  -TL              Manual Rx 5    Manual Rx 5 Location manual shld mob inferior/posterior  -TL              Manual Rx 6    Manual Rx 6 Location levator manual S with shld depression  -TL            User Key  (r) = Recorded By, (t) = Taken By, (c) = Cosigned By    Initials Name Provider Type    TL Ashley Kaur PTA Physical Therapist Assistant                 PT OP Goals     Row Name 04/05/22 1600          Long Term Goals    LTG Date to Achieve 05/11/22  -TL     LTG 1 Improve resting pain to 2/10 or better  -TL     LTG 1 Progress Ongoing  -TL     LTG 2 Full AROM of shoulders without increased pain in scapular region  -TL     LTG 2 Progress Ongoing  -TL     LTG 3 Sitting tolerance improved subjectively to no limits  -TL            Time Calculation    PT Goal Re-Cert Due Date 04/19/22  -TL           User Key  (r) = Recorded By, (t) = Taken By, (c) = Cosigned By     Initials Name Provider Type    TL Ashley Kaur PTA Physical Therapist Assistant                Therapy Education  Education Details: review upper trap s, levator s, 1st rib self mob, added shld squeezes and shld depression, shld rolls backwards, educated pt on posturing and no heavy lifting for now. recommended stretches in warm shower .  Given: HEP, Symptoms/condition management, Pain management, Posture/body mechanics  Program: New, Reinforced  How Provided: Verbal, Demonstration, Written  Provided to: Patient  Level of Understanding: Teach back education performed, Verbalized, Demonstrated              Time Calculation:   Start Time: 1524  Stop Time: 1620  Time Calculation (min): 56 min  PT Non-Billable Time (min): 18 min  Therapy Charges for Today     Code Description Service Date Service Provider Modifiers Qty    04800050575 HC PT THER PROC EA 15 MIN 4/5/2022 Ashley Kaur PTA GP, CQ 1    00908840265 HC PT MANUAL THERAPY EA 15 MIN 4/5/2022 Ashley Kaur PTA GP, CQ 2                    Ashley Kaur PTA  4/5/2022

## 2022-04-07 ENCOUNTER — TELEPHONE (OUTPATIENT)
Dept: FAMILY MEDICINE CLINIC | Facility: CLINIC | Age: 67
End: 2022-04-07

## 2022-04-07 ENCOUNTER — HOSPITAL ENCOUNTER (OUTPATIENT)
Dept: PHYSICAL THERAPY | Facility: HOSPITAL | Age: 67
Setting detail: THERAPIES SERIES
Discharge: HOME | End: 2022-04-07

## 2022-04-07 DIAGNOSIS — M54.12 CERVICAL RADICULOPATHY: Primary | ICD-10-CM

## 2022-04-07 PROCEDURE — 97140 MANUAL THERAPY 1/> REGIONS: CPT

## 2022-04-07 NOTE — THERAPY TREATMENT NOTE
Outpatient Physical Therapy Ortho Treatment Note  [facility]     Patient Name: Humera Pedro  : 1955  MRN: [id number]  Today's Date: 2022      Visit Date: 2022    Subjective Improvement 0  Visits 3/4  Visits 12  RTMD patient will call  Recert Date 2022    Cspine and left shoulder pain    Visit Dx:    ICD-10-CM ICD-9-CM   1. Cervical radiculopathy  M54.12 723.4       Patient Active Problem List   Diagnosis   • HX: breast cancer   • Gastroesophageal reflux disease with esophagitis   • Uterine prolapse   • Change in bowel habits   • Diarrhea   • Generalized abdominal pain        Past Medical History:   Diagnosis Date   • Acquired equinus deformity of foot     ANKLE   • Anxiety    • Breast cancer (HCC)    • Depression    • Diverticular disease of colon    • Esophagitis    • GERD (gastroesophageal reflux disease)    • Hx of radiation therapy    • Hyperlipidemia    • Plantar fasciitis    • Primary fibromyalgia syndrome    • Solitary pulmonary nodule present on computed tomography of lung         Past Surgical History:   Procedure Laterality Date   • BREAST SURGERY      Carcinoma of the right breast;Mastectomy   •  SECTION     • CHOLECYSTECTOMY WITH INTRAOPERATIVE CHOLANGIOGRAM N/A 2018    Procedure: LAPAROSCOPIC POSSIBLE OPEN CHOLECYSTECTOMY WITH INTRAOPERATIVE CHOLANGIOGRAM    (C-Arm # 1);  Surgeon: Dirk Leigh MD;  Location: [facility] OR;  Service: General   • COLONOSCOPY  2016    Normal colon.No specimens collected   • COLONOSCOPY N/A 3/19/2021    Procedure: COLONOSCOPY;  Surgeon: Brooks Reinoso MD;  Location: [facility] ENDOSCOPY;  Service: Gastroenterology;  Laterality: N/A;   • DIAGNOSTIC LAPAROSCOPY  1977    (Amenorrhea, probable polycystic ovaries. Polycystic ovaries   • ENDOSCOPY N/A 2019    Procedure: ESOPHAGOGASTRODUODENOSCOPY possible dilation;  Surgeon: Brooks Reinoso MD;  Location: [facility] ENDOSCOPY;  Service: Gastroenterology   • ENDOSCOPY N/A  3/19/2021    Procedure: ESOPHAGOGASTRODUODENOSCOPY;  Surgeon: Brooks Reinoso MD;  Location: [facility] ENDOSCOPY;  Service: Gastroenterology;  Laterality: N/A;   • ESOPHAGOSCOPY / EGD  02/22/2016    Mildly severe esophagitis.Gastritis.Normal examined duodenum.Medium sized hiatus hernia   • EXCISION BREAST LESION W/ PREOP NEEDLE LOC  06/17/1987    Bilateral excision of breast masses. Bilateral breast masses; probable fibrocystic disease.   • HYSTEROSCOPY  12/13/2011    Exam under anesthesia, diagnostic hysterectomy with fractional dilation and curettage. Thickened endometrial stripe, on Tamoxifen therapy.   • OTHER SURGICAL HISTORY  02/12/2016    NEEDLE BIOPSY LYMPH NODES; Ultrasound directed core needle biopsy of the left axilla.                        PT Assessment/Plan     Row Name 04/07/22 1706          PT Assessment    Assessment Comments Patient is TTP to Left UT and Left STM.  ME did correct rotation.  Patient does have full PROM of left shoulder and increase AROM of left shoulder after manual and ROM.  She demo increase Cspine AROM after manual however, no change in pain.  -CP            PT Plan    PT Frequency 2x/week  -CP     Predicted Duration of Therapy Intervention (PT) 4-6 weeks  -CP     PT Plan Comments Cont with POC.  doorway stretch  -CP           User Key  (r) = Recorded By, (t) = Taken By, (c) = Cosigned By    Initials Name Provider Type    Deepti Ron PTA Physical Therapist Assistant                 Modalities     Row Name 04/07/22 1500             Subjective Comments    Subjective Comments Patient states that she had an MRI and told them to fax her results to PT.  She is going to try to call her MD for more detailed results/.  Patient cont to report increase pain in left side neck and into shoulder blade  -CP              Subjective Pain    Able to rate subjective pain? yes  -CP      Pre-Treatment Pain Level --  painful  -CP      Post-Treatment Pain Level --  painfrul  -CP               Moist Heat    MH Applied Yes  -CP      Location cspine  -CP      PT Moist Heat Minutes 10  -CP      MH Prior to Rx Yes  -CP      MH S/P Rx Yes  -CP            User Key  (r) = Recorded By, (t) = Taken By, (c) = Cosigned By    Initials Name Provider Type    Deepti Ron PTA Physical Therapist Assistant               OP Exercises     Row Name 04/07/22 1718 04/07/22 1500          Subjective Comments    Subjective Comments -- Patient states that she had an MRI and told them to fax her results to PT.  She is going to try to call her MD for more detailed results/.  Patient cont to report increase pain in left side neck and into shoulder blade  -CP            Subjective Pain    Able to rate subjective pain? -- yes  -CP     Pre-Treatment Pain Level -- --  painful  -CP     Post-Treatment Pain Level -- --  painfrul  -CP            Total Minutes    92419 - PT Manual Therapy Minutes 30  -CP --            Exercise 1    Exercise Name 1 -- MHP to cspine  -CP     Time 1 -- 10  -CP            Exercise 2    Exercise Name 2 -- see manual  -CP            Exercise 3    Exercise Name 3 -- PROM to left shoulder  -CP     Cueing 3 -- Verbal;Tactile  -CP     Time 3 -- 6  -CP           User Key  (r) = Recorded By, (t) = Taken By, (c) = Cosigned By    Initials Name Provider Type    Deepti Ron PTA Physical Therapist Assistant                         Manual Rx (last 36 hours)     Manual Treatments     Row Name 04/07/22 1718 04/07/22 1700          Total Minutes    90346 - PT Manual Therapy Minutes 30  -CP --            Manual Rx 1    Manual Rx 1 Location -- cspine  -CP     Manual Rx 1 Type -- distraction gentle  -CP     Manual Rx 1 Duration -- 8  -CP            Manual Rx 2    Manual Rx 2 Location -- cspine  -CP     Manual Rx 2 Type -- ME to correct C 3-4 rotation  -CP            Manual Rx 3    Manual Rx 3 Location -- cspine  -CP     Manual Rx 3 Type -- PROM  -CP            Manual Rx 4    Manual Rx 4 Location -- Cspine  -CP      Manual Rx 4 Type -- UT and LT stretching  -CP            Manual Rx 5    Manual Rx 5 Location -- Left UT  -CP     Manual Rx 5 Type -- TRP  -CP            Manual Rx 6    Manual Rx 6 Location -- left STM  -CP     Manual Rx 6 Type -- TrP  -CP           User Key  (r) = Recorded By, (t) = Taken By, (c) = Cosigned By    Initials Name Provider Type    CP Deepti Watson PTA Physical Therapist Assistant                 PT OP Goals     Row Name 04/07/22 1700          Long Term Goals    LTG Date to Achieve 05/11/22  -CP     LTG 1 Improve resting pain to 2/10 or better  -CP     LTG 1 Progress Ongoing  -CP     LTG 2 Full AROM of shoulders without increased pain in scapular region  -CP     LTG 2 Progress Ongoing  -CP     LTG 3 Sitting tolerance improved subjectively to no limits  -CP            Time Calculation    PT Goal Re-Cert Due Date 04/19/22  -CP           User Key  (r) = Recorded By, (t) = Taken By, (c) = Cosigned By    Initials Name Provider Type    CP Deepti Watson PTA Physical Therapist Assistant                               Time Calculation:   Start Time: 1515  Stop Time: 1620  Time Calculation (min): 65 min  Total Timed Code Minutes- PT: 30 minute(s)  Timed Charges  88666 - PT Manual Therapy Minutes: 30  Untimed Charges  PT Moist Heat Minutes: 10  Total Minutes  Timed Charges Total Minutes: 30  Untimed Charges Total Minutes: 10   Total Minutes: 30  Therapy Charges for Today     Code Description Service Date Service Provider Modifiers Qty    07275178262 HC PT THER SUPP EA 15 MIN 4/7/2022 Deepti Watson PTA GP 1    42703959876 HC PT MANUAL THERAPY EA 15 MIN 4/7/2022 Deepti Watson PTA GP, CQ 2                    Deepti Watson PTA  4/7/2022

## 2022-04-08 DIAGNOSIS — M54.2 NECK PAIN: ICD-10-CM

## 2022-04-08 DIAGNOSIS — M54.12 CERVICAL RADICULOPATHY: ICD-10-CM

## 2022-04-12 ENCOUNTER — HOSPITAL ENCOUNTER (OUTPATIENT)
Dept: PHYSICAL THERAPY | Facility: HOSPITAL | Age: 67
Setting detail: THERAPIES SERIES
Discharge: HOME | End: 2022-04-12

## 2022-04-12 ENCOUNTER — TELEPHONE (OUTPATIENT)
Dept: FAMILY MEDICINE CLINIC | Facility: CLINIC | Age: 67
End: 2022-04-12

## 2022-04-12 DIAGNOSIS — M54.12 CERVICAL RADICULOPATHY: Primary | ICD-10-CM

## 2022-04-12 PROCEDURE — 97110 THERAPEUTIC EXERCISES: CPT

## 2022-04-12 PROCEDURE — 97140 MANUAL THERAPY 1/> REGIONS: CPT

## 2022-04-12 RX ORDER — AMITRIPTYLINE HYDROCHLORIDE 10 MG/1
10 TABLET, FILM COATED ORAL NIGHTLY
Qty: 30 TABLET | Refills: 2 | Status: SHIPPED | OUTPATIENT
Start: 2022-04-12 | End: 2022-05-10 | Stop reason: SDUPTHER

## 2022-04-12 NOTE — THERAPY TREATMENT NOTE
Outpatient Physical Therapy Ortho Treatment Note  [facility]     Patient Name: Humera Pedro  : 1955  MRN: [id number]  Today's Date: 2022      Visit Date: 2022  Subjective Improvement: some better, but not the pain  MD visit: patient will call  Visit Number: 4  Total Approved:12  Recert Date: 2022  Visit Dx:    ICD-10-CM ICD-9-CM   1. Cervical radiculopathy  M54.12 723.4       Patient Active Problem List   Diagnosis   • HX: breast cancer   • Gastroesophageal reflux disease with esophagitis   • Uterine prolapse   • Change in bowel habits   • Diarrhea   • Generalized abdominal pain        Past Medical History:   Diagnosis Date   • Acquired equinus deformity of foot     ANKLE   • Anxiety    • Breast cancer (HCC)    • Depression    • Diverticular disease of colon    • Esophagitis    • GERD (gastroesophageal reflux disease)    • Hx of radiation therapy    • Hyperlipidemia    • Plantar fasciitis    • Primary fibromyalgia syndrome    • Solitary pulmonary nodule present on computed tomography of lung         Past Surgical History:   Procedure Laterality Date   • BREAST SURGERY      Carcinoma of the right breast;Mastectomy   •  SECTION     • CHOLECYSTECTOMY WITH INTRAOPERATIVE CHOLANGIOGRAM N/A 2018    Procedure: LAPAROSCOPIC POSSIBLE OPEN CHOLECYSTECTOMY WITH INTRAOPERATIVE CHOLANGIOGRAM    (C-Arm # 1);  Surgeon: Dirk Leigh MD;  Location: [facility] OR;  Service: General   • COLONOSCOPY  2016    Normal colon.No specimens collected   • COLONOSCOPY N/A 3/19/2021    Procedure: COLONOSCOPY;  Surgeon: Brooks Reinoso MD;  Location: [facility] ENDOSCOPY;  Service: Gastroenterology;  Laterality: N/A;   • DIAGNOSTIC LAPAROSCOPY  1977    (Amenorrhea, probable polycystic ovaries. Polycystic ovaries   • ENDOSCOPY N/A 2019    Procedure: ESOPHAGOGASTRODUODENOSCOPY possible dilation;  Surgeon: Brooks Reinoso MD;  Location: [facility] ENDOSCOPY;  Service: Gastroenterology   •  ENDOSCOPY N/A 3/19/2021    Procedure: ESOPHAGOGASTRODUODENOSCOPY;  Surgeon: Brooks Reinoso MD;  Location: [facility] ENDOSCOPY;  Service: Gastroenterology;  Laterality: N/A;   • ESOPHAGOSCOPY / EGD  02/22/2016    Mildly severe esophagitis.Gastritis.Normal examined duodenum.Medium sized hiatus hernia   • EXCISION BREAST LESION W/ PREOP NEEDLE LOC  06/17/1987    Bilateral excision of breast masses. Bilateral breast masses; probable fibrocystic disease.   • HYSTEROSCOPY  12/13/2011    Exam under anesthesia, diagnostic hysterectomy with fractional dilation and curettage. Thickened endometrial stripe, on Tamoxifen therapy.   • OTHER SURGICAL HISTORY  02/12/2016    NEEDLE BIOPSY LYMPH NODES; Ultrasound directed core needle biopsy of the left axilla.        PT Ortho     Row Name 04/12/22 1400       Subjective Comments    Subjective Comments Pt reported that she did a inversion table twice. Pt is using brothers inversion table.  -TL       Precautions and Contraindications    Precautions hx of breast CA  -TL       Subjective Pain    Able to rate subjective pain? yes  -TL    Pre-Treatment Pain Level 6  -TL    Post-Treatment Pain Level --  feels some better. Pt reports some tingling in elbow  -TL          User Key  (r) = Recorded By, (t) = Taken By, (c) = Cosigned By    Initials Name Provider Type    TL Ashley Kaur PTA Physical Therapist Assistant                             PT Assessment/Plan     Row Name 04/12/22 1600          PT Assessment    Assessment Comments Pt's posturing is looking better. pt reports pain decrease some. Pt reported that she has been doing ex at home besides the ones given. Pt reports doing an inversion tabel a couple of times. PTA recommended not to be doing ex that we have not given her. PTA given pt doorway s and c-spine ROM. Encourage pt to watch posturing. Pt like to keep left shld hiked and neck in SB to left with rotation cspine. No new goals met  -TL            PT Plan    PT Frequency  2x/week  -TL     Predicted Duration of Therapy Intervention (PT) 4-6 weeks  -TL     PT Plan Comments continue with manual and start shld rows with light TB  -TL           User Key  (r) = Recorded By, (t) = Taken By, (c) = Cosigned By    Initials Name Provider Type    TL Ashley Kaur PTA Physical Therapist Assistant                   OP Exercises     Row Name 04/12/22 1400             Subjective Comments    Subjective Comments Pt reported that she did a inversion table twice. Pt is using brothers inversion table.  -TL              Subjective Pain    Able to rate subjective pain? yes  -TL      Pre-Treatment Pain Level 6  -TL      Post-Treatment Pain Level --  feels some better. Pt reports some tingling in elbow  -TL              Exercise 1    Exercise Name 1 MHP to cspine  -TL      Time 1 10mins  -TL              Exercise 2    Exercise Name 2 doorway S  -TL      Sets 2 3  -TL      Time 2 30 sec hold  -TL              Exercise 3    Exercise Name 3 Pulley  flexion  -TL      Reps 3 20  -TL              Exercise 4    Exercise Name 4 arom c-spine  -TL      Sets 4 10  -TL      Reps 4 5  -TL      Additional Comments flexion,ext, SB, rot  -TL              Exercise 5    Exercise Name 5 open books with left side on top  -TL      Reps 5 5  -TL      Time 5 10 sec hold  -TL              Exercise 6    Exercise Name 6 see manual  -TL            User Key  (r) = Recorded By, (t) = Taken By, (c) = Cosigned By    Initials Name Provider Type    TL Ashley Kaur PTA Physical Therapist Assistant                         Manual Rx (last 36 hours)     Manual Treatments     Row Name 04/12/22 1500             Manual Rx 1    Manual Rx 1 Location cspine  -TL      Manual Rx 1 Type distraction gentle  -TL              Manual Rx 2    Manual Rx 2 Location Manual UT  -TL              Manual Rx 3    Manual Rx 3 Location Manual levator S  -TL              Manual Rx 4    Manual Rx 4 Location SCM  -TL      Manual Rx 4 Grade pincer/opposing thumbs   -TL              Manual Rx 5    Manual Rx 5 Location shld depression manually  -TL              Manual Rx 6    Manual Rx 6 Location Left UT  -TL      Manual Rx 6 Grade MRF  -TL            User Key  (r) = Recorded By, (t) = Taken By, (c) = Cosigned By    Initials Name Provider Type    TL Ashley Kaur PTA Physical Therapist Assistant                 PT OP Goals     Row Name 04/12/22 1400          Long Term Goals    LTG Date to Achieve 05/11/22  -TL     LTG 1 Improve resting pain to 2/10 or better  -TL     LTG 1 Progress Ongoing  -TL     LTG 2 Full AROM of shoulders without increased pain in scapular region  -TL     LTG 2 Progress Ongoing  -TL     LTG 3 Sitting tolerance improved subjectively to no limits  -TL            Time Calculation    PT Goal Re-Cert Due Date 04/19/22  -TL           User Key  (r) = Recorded By, (t) = Taken By, (c) = Cosigned By    Initials Name Provider Type    TL Ashley Kaur PTA Physical Therapist Assistant                               Time Calculation:   Start Time: 1435  Stop Time: 1525  Time Calculation (min): 50 min  PT Non-Billable Time (min): 10 min  Therapy Charges for Today     Code Description Service Date Service Provider Modifiers Qty    20411863543 HC PT THER PROC EA 15 MIN 4/12/2022 Ashley Kaur PTA GP, CQ 1    98570077565 HC PT MANUAL THERAPY EA 15 MIN 4/12/2022 Ashley Kaur PTA GP, CQ 2                    Ashley Kuar PTA  4/12/2022

## 2022-04-12 NOTE — TELEPHONE ENCOUNTER
Pt needs results for MRI  and wants Dr to call in Amitryptolin or Elivil and is needing for nerve pain .  CANNOT take gabapentin or lyrica   And takes citalopram for depression   Call into [facility]   Pt [phone]

## 2022-04-14 ENCOUNTER — HOSPITAL ENCOUNTER (OUTPATIENT)
Dept: PHYSICAL THERAPY | Facility: HOSPITAL | Age: 67
Setting detail: THERAPIES SERIES
Discharge: HOME | End: 2022-04-14

## 2022-04-14 DIAGNOSIS — M54.12 CERVICAL RADICULOPATHY: Primary | ICD-10-CM

## 2022-04-14 PROCEDURE — 97110 THERAPEUTIC EXERCISES: CPT

## 2022-04-14 PROCEDURE — 97140 MANUAL THERAPY 1/> REGIONS: CPT

## 2022-04-14 NOTE — THERAPY TREATMENT NOTE
Outpatient Physical Therapy Ortho Treatment Note  [facility]     Patient Name: Humera Pedro  : 1955  MRN: [id number]  Today's Date: 2022      Visit Date: 2022  Subjective Improvement: some better, but still has pain and tingling  MD visit: MARIXA  Visit Number:   Total Approved:12  Recert Date: 2022  Visit Dx:    ICD-10-CM ICD-9-CM   1. Cervical radiculopathy  M54.12 723.4       Patient Active Problem List   Diagnosis   • HX: breast cancer   • Gastroesophageal reflux disease with esophagitis   • Uterine prolapse   • Change in bowel habits   • Diarrhea   • Generalized abdominal pain        Past Medical History:   Diagnosis Date   • Acquired equinus deformity of foot     ANKLE   • Anxiety    • Breast cancer (HCC)    • Depression    • Diverticular disease of colon    • Esophagitis    • GERD (gastroesophageal reflux disease)    • Hx of radiation therapy    • Hyperlipidemia    • Plantar fasciitis    • Primary fibromyalgia syndrome    • Solitary pulmonary nodule present on computed tomography of lung         Past Surgical History:   Procedure Laterality Date   • BREAST SURGERY      Carcinoma of the right breast;Mastectomy   •  SECTION     • CHOLECYSTECTOMY WITH INTRAOPERATIVE CHOLANGIOGRAM N/A 2018    Procedure: LAPAROSCOPIC POSSIBLE OPEN CHOLECYSTECTOMY WITH INTRAOPERATIVE CHOLANGIOGRAM    (C-Arm # 1);  Surgeon: Dirk Leigh MD;  Location: [facility] OR;  Service: General   • COLONOSCOPY  2016    Normal colon.No specimens collected   • COLONOSCOPY N/A 3/19/2021    Procedure: COLONOSCOPY;  Surgeon: Brooks Reinoso MD;  Location: [facility] ENDOSCOPY;  Service: Gastroenterology;  Laterality: N/A;   • DIAGNOSTIC LAPAROSCOPY  1977    (Amenorrhea, probable polycystic ovaries. Polycystic ovaries   • ENDOSCOPY N/A 2019    Procedure: ESOPHAGOGASTRODUODENOSCOPY possible dilation;  Surgeon: Brooks Reinoso MD;  Location: [facility] ENDOSCOPY;  Service: Gastroenterology   •  ENDOSCOPY N/A 3/19/2021    Procedure: ESOPHAGOGASTRODUODENOSCOPY;  Surgeon: Brooks Reinoso MD;  Location: [facility] ENDOSCOPY;  Service: Gastroenterology;  Laterality: N/A;   • ESOPHAGOSCOPY / EGD  02/22/2016    Mildly severe esophagitis.Gastritis.Normal examined duodenum.Medium sized hiatus hernia   • EXCISION BREAST LESION W/ PREOP NEEDLE LOC  06/17/1987    Bilateral excision of breast masses. Bilateral breast masses; probable fibrocystic disease.   • HYSTEROSCOPY  12/13/2011    Exam under anesthesia, diagnostic hysterectomy with fractional dilation and curettage. Thickened endometrial stripe, on Tamoxifen therapy.   • OTHER SURGICAL HISTORY  02/12/2016    NEEDLE BIOPSY LYMPH NODES; Ultrasound directed core needle biopsy of the left axilla.        PT Ortho     Row Name 04/14/22 1300       Precautions and Contraindications    Precautions hx of breast CA  -TL          User Key  (r) = Recorded By, (t) = Taken By, (c) = Cosigned By    Initials Name Provider Type    Ashley García PTA Physical Therapist Assistant                             PT Assessment/Plan     Row Name 04/14/22 1300          PT Assessment    Assessment Comments Pt continue to improve posturing. Pt tolerated new ex of shld rows with RTB. PTA went over sleep position with pillow. PTA instructed pt not raise arm over head or sleep directly on shld.  Pt reports doing her HEP. Pt continues to have tingling sensation at times down left arm but not consistent. No goals met at this time. Pt is going to see neurosurgen per patient. Pt does not have appt at this time.  -TL            PT Plan    PT Frequency 2x/week  -TL     Predicted Duration of Therapy Intervention (PT) 4-6 weeks  -TL     PT Plan Comments add wall push ups next visit and chin tucks.  Possible scalene s  -TL           User Key  (r) = Recorded By, (t) = Taken By, (c) = Cosigned By    Initials Name Provider Type    Ashley García PTA Physical Therapist Assistant                  Modalities     Row Name 04/14/22 1300             Moist Heat    MH Applied Yes  -TL      Location c-spine  -TL      MH S/P Rx Yes  10 mins  -TL            User Key  (r) = Recorded By, (t) = Taken By, (c) = Cosigned By    Initials Name Provider Type    Ashley García PTA Physical Therapist Assistant               OP Exercises     Row Name 04/14/22 1300             Subjective Comments    Subjective Comments Pt reported that she is going to see a neurosurgen.  -TL              Subjective Pain    Able to rate subjective pain? yes  -TL      Pre-Treatment Pain Level 6  -TL      Post-Treatment Pain Level 3  -TL              Total Minutes    05609 - PT Manual Therapy Minutes 30  -TL              Exercise 1    Exercise Name 1 corner S  -TL      Sets 1 3  -TL      Time 1 30 sec hold  -TL              Exercise 2    Exercise Name 2 1 st rib mobe with strap  -TL      Sets 2 2  -TL      Time 2 30 sec hold  -TL              Exercise 3    Exercise Name 3 shld rows mid/high  -TL      Sets 3 2  -TL      Reps 3 10  -TL              Exercise 4    Exercise Name 4 See Manual  -TL            User Key  (r) = Recorded By, (t) = Taken By, (c) = Cosigned By    Initials Name Provider Type    TL Ashley Kaur PTA Physical Therapist Assistant                         Manual Rx (last 36 hours)     Manual Treatments     Row Name 04/14/22 1300             Total Minutes    74917 - PT Manual Therapy Minutes 30  -TL              Manual Rx 1    Manual Rx 1 Location cspine  -TL              Manual Rx 2    Manual Rx 2 Location Manual UT  -TL              Manual Rx 3    Manual Rx 3 Location Manual levator S  -TL              Manual Rx 4    Manual Rx 4 Location SCM  -TL              Manual Rx 5    Manual Rx 5 Location SL shld inferior/posterior mob  -TL              Manual Rx 6    Manual Rx 6 Location scalences  -TL      Manual Rx 6 Grade pressure  -TL              Manual Rx 7    Manual Rx 7 Location PROM c-spine Rotation/ SB  -TL            User  Key  (r) = Recorded By, (t) = Taken By, (c) = Cosigned By    Initials Name Provider Type    Ashley García PTA Physical Therapist Assistant                 PT OP Goals     Row Name 04/14/22 1400          Long Term Goals    LTG Date to Achieve 05/11/22  -TL     LTG 1 Improve resting pain to 2/10 or better  -TL     LTG 1 Progress Ongoing  -TL     LTG 2 Full AROM of shoulders without increased pain in scapular region  -TL     LTG 2 Progress Ongoing  -TL     LTG 3 Sitting tolerance improved subjectively to no limits  -TL     LTG 3 Progress Ongoing  -TL            Time Calculation    PT Goal Re-Cert Due Date 04/19/22  -TL           User Key  (r) = Recorded By, (t) = Taken By, (c) = Cosigned By    Initials Name Provider Type    Ashley García PTA Physical Therapist Assistant                               Time Calculation:   Start Time: 1300  Stop Time: 1356  Time Calculation (min): 56 min  PT Non-Billable Time (min): 10 min  Timed Charges  87060 - PT Manual Therapy Minutes: 30  Total Minutes  Timed Charges Total Minutes: 30   Total Minutes: 30  Therapy Charges for Today     Code Description Service Date Service Provider Modifiers Qty    33552097656 HC PT MANUAL THERAPY EA 15 MIN 4/14/2022 Ashley Kaur PTA GP, CQ 2    32337284774 HC PT THER PROC EA 15 MIN 4/14/2022 Ashley Kaur PTA GP, CQ 1                    Ashley Kaur PTA  4/14/2022

## 2022-04-19 ENCOUNTER — OFFICE VISIT (OUTPATIENT)
Dept: FAMILY MEDICINE CLINIC | Facility: CLINIC | Age: 67
End: 2022-04-19

## 2022-04-19 ENCOUNTER — APPOINTMENT (OUTPATIENT)
Dept: PHYSICAL THERAPY | Facility: HOSPITAL | Age: 67
End: 2022-04-19

## 2022-04-19 VITALS
BODY MASS INDEX: 30.22 KG/M2 | SYSTOLIC BLOOD PRESSURE: 132 MMHG | HEIGHT: 66 IN | WEIGHT: 188 LBS | HEART RATE: 70 BPM | DIASTOLIC BLOOD PRESSURE: 74 MMHG | OXYGEN SATURATION: 98 %

## 2022-04-19 DIAGNOSIS — Z12.83 SKIN EXAM, SCREENING FOR CANCER: ICD-10-CM

## 2022-04-19 DIAGNOSIS — I10 ESSENTIAL HYPERTENSION: ICD-10-CM

## 2022-04-19 DIAGNOSIS — M54.12 CERVICAL RADICULOPATHY: ICD-10-CM

## 2022-04-19 DIAGNOSIS — M54.2 NECK PAIN: Primary | ICD-10-CM

## 2022-04-19 PROCEDURE — 99214 OFFICE O/P EST MOD 30 MIN: CPT | Performed by: FAMILY MEDICINE

## 2022-04-19 RX ORDER — CYCLOBENZAPRINE HCL 5 MG
5 TABLET ORAL 3 TIMES DAILY PRN
Qty: 60 TABLET | Refills: 0 | Status: SHIPPED | OUTPATIENT
Start: 2022-04-19 | End: 2022-05-02

## 2022-04-21 ENCOUNTER — HOSPITAL ENCOUNTER (OUTPATIENT)
Dept: PHYSICAL THERAPY | Facility: HOSPITAL | Age: 67
Setting detail: THERAPIES SERIES
Discharge: HOME | End: 2022-04-21

## 2022-04-21 DIAGNOSIS — M54.12 CERVICAL RADICULOPATHY: Primary | ICD-10-CM

## 2022-04-21 PROCEDURE — 97112 NEUROMUSCULAR REEDUCATION: CPT | Performed by: PHYSICAL THERAPIST

## 2022-04-21 PROCEDURE — 97140 MANUAL THERAPY 1/> REGIONS: CPT | Performed by: PHYSICAL THERAPIST

## 2022-04-21 NOTE — THERAPY TREATMENT NOTE
Outpatient Physical Therapy Ortho Progress Note  [facility]     Patient Name: Humera Pedro  : 1955  MRN: [id number]  Today's Date: 2022      Visit Date: 2022  Subjective Improvement: 25%  MD visit: MARIXA  Visit Number:   Total Approved:12  Recert Date:   Visit Dx: cervical radiculopathy    ICD-10-CM ICD-9-CM   1. Cervical radiculopathy  M54.12 723.4       Patient Active Problem List   Diagnosis   • HX: breast cancer   • Gastroesophageal reflux disease with esophagitis   • Uterine prolapse   • Change in bowel habits   • Diarrhea   • Generalized abdominal pain        Past Medical History:   Diagnosis Date   • Acquired equinus deformity of foot     ANKLE   • Anxiety    • Breast cancer (HCC)    • Depression    • Diverticular disease of colon    • Esophagitis    • GERD (gastroesophageal reflux disease)    • Hx of radiation therapy    • Hyperlipidemia    • Plantar fasciitis    • Primary fibromyalgia syndrome    • Solitary pulmonary nodule present on computed tomography of lung         Past Surgical History:   Procedure Laterality Date   • BREAST SURGERY      Carcinoma of the right breast;Mastectomy   •  SECTION     • CHOLECYSTECTOMY WITH INTRAOPERATIVE CHOLANGIOGRAM N/A 2018    Procedure: LAPAROSCOPIC POSSIBLE OPEN CHOLECYSTECTOMY WITH INTRAOPERATIVE CHOLANGIOGRAM    (C-Arm # 1);  Surgeon: Dirk Leigh MD;  Location: [facility] OR;  Service: General   • COLONOSCOPY  2016    Normal colon.No specimens collected   • COLONOSCOPY N/A 3/19/2021    Procedure: COLONOSCOPY;  Surgeon: Brooks Reinoso MD;  Location: [facility] ENDOSCOPY;  Service: Gastroenterology;  Laterality: N/A;   • DIAGNOSTIC LAPAROSCOPY  1977    (Amenorrhea, probable polycystic ovaries. Polycystic ovaries   • ENDOSCOPY N/A 2019    Procedure: ESOPHAGOGASTRODUODENOSCOPY possible dilation;  Surgeon: Brooks Reinoso MD;  Location: [facility] ENDOSCOPY;  Service: Gastroenterology   • ENDOSCOPY N/A  3/19/2021    Procedure: ESOPHAGOGASTRODUODENOSCOPY;  Surgeon: Brooks Reinoso MD;  Location: [facility] ENDOSCOPY;  Service: Gastroenterology;  Laterality: N/A;   • ESOPHAGOSCOPY / EGD  02/22/2016    Mildly severe esophagitis.Gastritis.Normal examined duodenum.Medium sized hiatus hernia   • EXCISION BREAST LESION W/ PREOP NEEDLE LOC  06/17/1987    Bilateral excision of breast masses. Bilateral breast masses; probable fibrocystic disease.   • HYSTEROSCOPY  12/13/2011    Exam under anesthesia, diagnostic hysterectomy with fractional dilation and curettage. Thickened endometrial stripe, on Tamoxifen therapy.   • OTHER SURGICAL HISTORY  02/12/2016    NEEDLE BIOPSY LYMPH NODES; Ultrasound directed core needle biopsy of the left axilla.        PT Ortho     Row Name 04/21/22 1500       Subjective Comments    Subjective Comments Patient reports tingling and burning into to left elbow and forearm. Initially it was going into third digit.  She feels like symptoms have improved some as she can tolerate touching left elbow now. Sitting is the worst position.  -SW       Precautions and Contraindications    Precautions hx of breast CA  -SW       Subjective Pain    Able to rate subjective pain? yes  -SW    Pre-Treatment Pain Level 5  -SW    Post-Treatment Pain Level 1  -SW       Posture/Observations    Posture/Observations Comments mild forward head and rounded shoulders  -SW       Cervical/Thoracic Special Tests    Spurlings (Foraminal Compression) Bilateral:;Negative  -SW       General ROM    Head/Neck/Trunk Neck Extension;Neck Flexion;Neck Lt Rotation;Neck Rt Rotation  -SW       Head/Neck/Trunk    Neck Extension AROM 25%  -SW    Neck Flexion AROM 75%  -SW    Neck Lt Rotation AROM 50%  -SW    Neck Rt Rotation AROM 50%  -SW       MMT (Manual Muscle Testing)    General MMT Comments bilateral UE myotomes 5/5  -SW          User Key  (r) = Recorded By, (t) = Taken By, (c) = Cosigned By    Initials Name Provider Type    Olesya Altamirano  Physical Therapist                             PT Assessment/Plan     Row Name 04/21/22 1500          PT Assessment    Functional Limitations Limitations in functional capacity and performance;Limitation in home management;Performance in leisure activities  -     Impairments Impaired muscle length;Muscle strength;Pain;Poor body mechanics;Range of motion;Posture  -     Assessment Comments Patient exhibit mild forward head and rounded shoulders wtih reduced cervical and thoracic mobility, tightness in surrounding musculature, and weakness in deep neck flexors.  -     Rehab Potential Good  -SW     Patient/caregiver participated in establishment of treatment plan and goals Yes  -SW     Patient would benefit from skilled therapy intervention Yes  -SW            PT Plan    PT Frequency 2x/week  -SW     Predicted Duration of Therapy Intervention (PT) 6 weeks  -SW     Planned CPT's? PT THER PROC EA 15 MIN: 16937;PT THER ACT EA 15 MIN: 81835;PT MANUAL THERAPY EA 15 MIN: 04772;PT NEUROMUSC RE-EDUCATION EA 15 MIN: 57721;PT SELF CARE/HOME MGMT/TRAIN EA 15: 49294;PT HOT OR COLD PACK TREAT MCARE  -     Physical Therapy Interventions (Optional Details) home exercise program;manual therapy techniques;modalities;neuromuscular re-education;patient/family education;postural re-education;ROM (Range of Motion);strengthening;stretching  -     PT Plan Comments Focus on cervical and thoracic mobility and deep neck flexor strengthening. Add chin tuck head lift to exercise regime at next visit.  -           User Key  (r) = Recorded By, (t) = Taken By, (c) = Cosigned By    Initials Name Provider Type    Olesya Altamirano Physical Therapist                   OP Exercises     Row Name 04/21/22 1500             Subjective Comments    Subjective Comments Patient reports tingling and burning into to left elbow and forearm. Initially it was going into third digit.  She feels like symptoms have improved some as she can tolerate touching  left elbow now. Sitting is the worst position.  -SW              Subjective Pain    Able to rate subjective pain? yes  -SW      Pre-Treatment Pain Level 5  -SW      Post-Treatment Pain Level 1  -SW              Exercise 1    Exercise Name 1 Pro II L2  -SW      Time 1 10'  -SW              Exercise 2    Exercise Name 2 supine flexion and swimmers 0#  -SW      Reps 2 10 ea  -SW              Exercise 3    Exercise Name 3 PROM/stretching/mob  -SW      Time 3 25'  -SW            User Key  (r) = Recorded By, (t) = Taken By, (c) = Cosigned By    Initials Name Provider Type    Olesya Altamirano Physical Therapist                              PT OP Goals     Row Name 04/21/22 1500          Long Term Goals    LTG Date to Achieve 05/11/22  -SW     LTG 1 Improve resting pain to 2/10 or better  -SW     LTG 1 Progress Ongoing  -SW     LTG 2 Full AROM of shoulders without increased pain in scapular region  -SW     LTG 2 Progress Ongoing  -SW     LTG 3 Sitting tolerance improved subjectively to no limits  -SW     LTG 3 Progress Ongoing  -SW     LTG 4 Cervical AROM to 75% all directions.  -SW     LTG 4 Progress New  -            Time Calculation    PT Goal Re-Cert Due Date 05/12/22  -           User Key  (r) = Recorded By, (t) = Taken By, (c) = Cosigned By    Initials Name Provider Type    Olesya Altamirano Physical Therapist                               Time Calculation:   Start Time: 1520  Stop Time: 1600  Time Calculation (min): 40 min  Therapy Charges for Today     Code Description Service Date Service Provider Modifiers Qty    82967745541 HC PT MANUAL THERAPY EA 15 MIN 4/21/2022 Olesya Payne GP 2    00783674908 HC PT NEUROMUSC RE EDUCATION EA 15 MIN 4/21/2022 Olesya Payne GP 1                    Olesya Payne  4/21/2022

## 2022-04-26 ENCOUNTER — APPOINTMENT (OUTPATIENT)
Dept: PHYSICAL THERAPY | Facility: HOSPITAL | Age: 67
End: 2022-04-26

## 2022-04-28 ENCOUNTER — APPOINTMENT (OUTPATIENT)
Dept: PHYSICAL THERAPY | Facility: HOSPITAL | Age: 67
End: 2022-04-28

## 2022-05-02 RX ORDER — CYCLOBENZAPRINE HCL 5 MG
TABLET ORAL
Qty: 60 TABLET | Refills: 0 | Status: SHIPPED | OUTPATIENT
Start: 2022-05-02 | End: 2022-11-30

## 2022-05-03 ENCOUNTER — APPOINTMENT (OUTPATIENT)
Dept: PHYSICAL THERAPY | Facility: HOSPITAL | Age: 67
End: 2022-05-03

## 2022-05-03 RX ORDER — CHOLESTYRAMINE 4 G/9G
POWDER, FOR SUSPENSION ORAL
Qty: 180 PACKET | Refills: 3 | OUTPATIENT
Start: 2022-05-03

## 2022-05-03 RX ORDER — DEXLANSOPRAZOLE 60 MG/1
CAPSULE, DELAYED RELEASE ORAL
Qty: 90 CAPSULE | Refills: 0 | OUTPATIENT
Start: 2022-05-03

## 2022-05-05 ENCOUNTER — HOSPITAL ENCOUNTER (OUTPATIENT)
Dept: PHYSICAL THERAPY | Facility: HOSPITAL | Age: 67
Setting detail: THERAPIES SERIES
Discharge: HOME | End: 2022-05-05

## 2022-05-05 DIAGNOSIS — M54.12 CERVICAL RADICULOPATHY: Primary | ICD-10-CM

## 2022-05-05 PROCEDURE — 97140 MANUAL THERAPY 1/> REGIONS: CPT | Performed by: PHYSICAL THERAPIST

## 2022-05-05 NOTE — THERAPY PROGRESS REPORT/RE-CERT
Outpatient Physical Therapy Ortho Progress Note  [facility]     Patient Name: Humera Pedro  : 1955  MRN: [id number]  Today's Date: 2022      Visit Date: 2022  Subjective Improvement: 25%  MD visit: MARIXA  Visit Number:   Total Approved:12  Recert Date: 22  Visit Dx: cervical radiculopathy  Visit Dx:    ICD-10-CM ICD-9-CM   1. Cervical radiculopathy  M54.12 723.4       Patient Active Problem List   Diagnosis   • HX: breast cancer   • Gastroesophageal reflux disease with esophagitis   • Uterine prolapse   • Change in bowel habits   • Diarrhea   • Generalized abdominal pain        Past Medical History:   Diagnosis Date   • Acquired equinus deformity of foot     ANKLE   • Anxiety    • Breast cancer (HCC)    • Depression    • Diverticular disease of colon    • Esophagitis    • GERD (gastroesophageal reflux disease)    • Hx of radiation therapy    • Hyperlipidemia    • Plantar fasciitis    • Primary fibromyalgia syndrome    • Solitary pulmonary nodule present on computed tomography of lung         Past Surgical History:   Procedure Laterality Date   • BREAST SURGERY      Carcinoma of the right breast;Mastectomy   •  SECTION     • CHOLECYSTECTOMY WITH INTRAOPERATIVE CHOLANGIOGRAM N/A 2018    Procedure: LAPAROSCOPIC POSSIBLE OPEN CHOLECYSTECTOMY WITH INTRAOPERATIVE CHOLANGIOGRAM    (C-Arm # 1);  Surgeon: Dirk Leigh MD;  Location: [facility] OR;  Service: General   • COLONOSCOPY  2016    Normal colon.No specimens collected   • COLONOSCOPY N/A 3/19/2021    Procedure: COLONOSCOPY;  Surgeon: Brooks Reinoso MD;  Location: [facility] ENDOSCOPY;  Service: Gastroenterology;  Laterality: N/A;   • DIAGNOSTIC LAPAROSCOPY  1977    (Amenorrhea, probable polycystic ovaries. Polycystic ovaries   • ENDOSCOPY N/A 2019    Procedure: ESOPHAGOGASTRODUODENOSCOPY possible dilation;  Surgeon: Brooks Reinoso MD;  Location: [facility] ENDOSCOPY;  Service: Gastroenterology   • ENDOSCOPY N/A  3/19/2021    Procedure: ESOPHAGOGASTRODUODENOSCOPY;  Surgeon: Brooks Reinoso MD;  Location: [facility] ENDOSCOPY;  Service: Gastroenterology;  Laterality: N/A;   • ESOPHAGOSCOPY / EGD  02/22/2016    Mildly severe esophagitis.Gastritis.Normal examined duodenum.Medium sized hiatus hernia   • EXCISION BREAST LESION W/ PREOP NEEDLE LOC  06/17/1987    Bilateral excision of breast masses. Bilateral breast masses; probable fibrocystic disease.   • HYSTEROSCOPY  12/13/2011    Exam under anesthesia, diagnostic hysterectomy with fractional dilation and curettage. Thickened endometrial stripe, on Tamoxifen therapy.   • OTHER SURGICAL HISTORY  02/12/2016    NEEDLE BIOPSY LYMPH NODES; Ultrasound directed core needle biopsy of the left axilla.        PT Ortho     Row Name 05/05/22 1410       Precautions and Contraindications    Precautions hx of breast CA  -BB       Special Tests/Palpation    Special Tests/Palpation Cervical/Thoracic  -BB       Cervical Palpation    Cervical Palpation- Location? --  tender of pect, trap, bicep, 1st rib  -BB       Cervical/Thoracic Special Tests    1st Rib Mobility (TOS) Positive;Left:  -BB    Cervical/Thoracic Special Tests Comments increased radicular symptoms with elbow bent and resolves with elbow extended  -BB       Head/Neck/Trunk    Neck Extension AROM increased radicular symptoms intermittently  -BB    Neck Extension PROM full  -BB    Neck Flexion AROM full without symptom change  -BB    Neck Flexion PROM full  -BB    Neck Lt Rotation AROM pulling in back with about 25% deficit  -BB    Neck Lt Rotation PROM full  -BB    Neck Rt Rotation AROM pulling in back with about 25% deficit  -BB    Neck Rt Rotation PROM full  -BB       MMT (Manual Muscle Testing)    General MMT Comments Bilateral UE are WNL  -BB       Sensation    Sensation WNL? WNL  -BB    Light Touch No apparent deficits  -BB          User Key  (r) = Recorded By, (t) = Taken By, (c) = Cosigned By    Initials Name Provider Type    BB  Brenda Rick, DAVION DPT Physical Therapist                             PT Assessment/Plan     Row Name 05/05/22 1410          PT Assessment    Functional Limitations Limitations in functional capacity and performance;Limitation in home management;Performance in leisure activities  -BB     Impairments Impaired muscle length;Muscle strength;Pain;Poor body mechanics;Range of motion;Posture  -BB     Assessment Comments PAtient presents today with full functional PROM of the cervical spine without significant symptom change and no consistent creation or resolution of radicular symptoms with facet gapping or closing. biggest change of symptoms noted with elbow moving from flexed to extended position with immediate radicular symptoms with elbow bent and resolves with extended arm. Reported improved AROM of cspine after treatment this date with less pulling. Has neuro appt next week and recommend to return to PT after see neuro and thier recommendations with patient in agreement.  -BB     Rehab Potential Good  -BB     Patient/caregiver participated in establishment of treatment plan and goals Yes  -BB     Patient would benefit from skilled therapy intervention Yes  -BB            PT Plan    PT Frequency 2x/week  -BB     Predicted Duration of Therapy Intervention (PT) 3 weeks  -BB     PT Plan Comments progress HEP for ulnar nerve glides, teach CTS HEP, continue manual therapy to decreased cervical spine loading and muscular tension, progress HEP for scapular and postural strength  -BB           User Key  (r) = Recorded By, (t) = Taken By, (c) = Cosigned By    Initials Name Provider Type    BB Brenda Rick PT DPT Physical Therapist                   OP Exercises     Row Name 05/05/22 1410             Subjective Comments    Subjective Comments Present today with reports of seeing neurologist 5/11/22 for further recommendations related to symptoms. States symptoms are better since starting PT but remains to have tingling in  "arm around elbow. States pain in 4th and 5th fingers are better and muscles and elbow sensitivity is better. States has hot tingly sensation with picking up items sometimes. States arm immediately tingles with elbow bent at 90.  -BB              Subjective Pain    Able to rate subjective pain? yes  -BB      Subjective Pain Comment specific pain not noted \"feels better\"  -BB              Exercise 1    Exercise Name 1 recheck  -BB              Exercise 2    Exercise Name 2 MFR to pect, LS, UT- trp holds and opposing thumbs gentle  -BB              Exercise 3    Exercise Name 3 MWM for c-spine extension  -BB      Sets 3 1  -BB      Reps 3 5  -BB      Additional Comments flex to extension AROM sitting  -BB              Exercise 4    Exercise Name 4 MWM for rotation each way  -BB      Sets 4 2  -BB      Reps 4 5  -BB              Exercise 5    Exercise Name 5 supine distraction for cspine  -BB      Time 5 10'  -BB              Exercise 6    Exercise Name 6 sitting distraction  -BB      Time 6 2'  -BB            User Key  (r) = Recorded By, (t) = Taken By, (c) = Cosigned By    Initials Name Provider Type    Brenda Golden, PT DPT Physical Therapist                              PT OP Goals     Row Name 05/05/22 1410          Long Term Goals    LTG Date to Achieve 05/26/22  -BB     LTG 1 Improve resting pain to 2/10 or better  -BB     LTG 1 Progress Not Met  -BB     LTG 2 Full AROM of shoulders without increased pain in scapular region  -BB     LTG 2 Progress Met  -BB     LTG 3 Sitting tolerance improved subjectively to no limits  -BB     LTG 3 Progress Ongoing  -BB     LTG 4 Cervical AROM to 75% all directions.  -BB     LTG 4 Progress Partially Met  -BB     LTG 4 Progress Comments PROM met, pain with AROM  -BB            Time Calculation    PT Goal Re-Cert Due Date 05/26/22  -BB           User Key  (r) = Recorded By, (t) = Taken By, (c) = Cosigned By    Initials Name Provider Type    Brenda Golden PT DPT Physical " Therapist                               Time Calculation:   Start Time: 1410  Stop Time: 1500  Time Calculation (min): 50 min  Therapy Charges for Today     Code Description Service Date Service Provider Modifiers Qty    29398763992 HC PT MANUAL THERAPY EA 15 MIN 5/5/2022 Brenda Rick, PT DPT GP 3                    Brenda Rick, PT DPT  5/5/2022

## 2022-05-10 ENCOUNTER — TELEPHONE (OUTPATIENT)
Dept: PHYSICAL THERAPY | Facility: HOSPITAL | Age: 67
End: 2022-05-10

## 2022-05-10 DIAGNOSIS — M54.12 CERVICAL RADICULOPATHY: ICD-10-CM

## 2022-05-10 RX ORDER — AMITRIPTYLINE HYDROCHLORIDE 10 MG/1
10 TABLET, FILM COATED ORAL NIGHTLY
Qty: 30 TABLET | Refills: 2 | Status: SHIPPED | OUTPATIENT
Start: 2022-05-10 | End: 2022-08-18 | Stop reason: SDUPTHER

## 2022-05-15 NOTE — PROGRESS NOTES
Chief Complaint  Back Pain, Neck Pain, and Nevus (Needs referral for [facility]. Dr. Joyner )    Subjective    History of Present Illness {CC  Problem List  Visit  Diagnosis   Encounters  Notes  Medications  Labs  Result Review Imaging  Media :23}     Humera Pedro presents to [facility] [place] for     Chief Complaint   Patient presents with   • Back Pain   • Neck Pain   • Nevus     Needs referral for [facility]. Dr. Joyner       Patient seen today for follow up.  Continues to have neck pain/cervical radiculopathy.  Unable to tolerate gabapentin.  Had MRI, which showed report with uncertainty whether or not nerve roots impacted.  Patient's pain is not improved.  Has been going to the chiropractor, not helping enough.  Pain interfering with daily activities and sleep.      Current Outpatient Medications:   •  albuterol sulfate HFA (ProAir HFA) 108 (90 Base) MCG/ACT inhaler, Inhale 2 puffs Every 4 (Four) Hours As Needed for Wheezing., Disp: 8 g, Rfl: 2  •  citalopram (CeleXA) 20 MG tablet, Take 1 tablet by mouth Daily., Disp: 90 tablet, Rfl: 3  •  Dexilant 60 MG capsule, TAKE 1 CAPSULE BY MOUTH EVERY DAY, Disp: 90 capsule, Rfl: 0  •  dicyclomine (BENTYL) 10 MG capsule, TAKE 1 CAPSULE BY MOUTH 3 TIMES A DAY AS NEEDED FOR CRAMPING AND DIARRHEA, Disp: 90 capsule, Rfl: 1  •  fluticasone (FLONASE) 50 MCG/ACT nasal spray, 2 sprays into the nostril(s) as directed by provider Daily As Needed for Rhinitis., Disp: , Rfl:   •  lisinopril (PRINIVIL,ZESTRIL) 10 MG tablet, Take 1 tablet by mouth Daily., Disp: 90 tablet, Rfl: 3  •  Multiple Vitamins-Minerals (WOMENS MULTI PO), Take 1 tablet by mouth Daily., Disp: , Rfl:   •  vitamin B-12 (CYANOCOBALAMIN) 1000 MCG tablet, Take 1,000 mcg by mouth Daily., Disp: , Rfl:   •  amitriptyline (ELAVIL) 10 MG tablet, Take 1 tablet by mouth Every Night., Disp: 30 tablet, Rfl: 2  •  cyclobenzaprine (FLEXERIL) 5  "MG tablet, TAKE 1 TABLET BY MOUTH 3 TIMES A DAY AS NEEDED FOR MUSCLE SPASMS., Disp: 60 tablet, Rfl: 0     Objective       Vital Signs:   /74   Pulse 70   Ht 167.6 cm (66\")   Wt 85.3 kg (188 lb)   SpO2 98%   BMI 30.34 kg/m²     Physical Exam  Vitals reviewed.   Constitutional:       General: She is not in acute distress.     Appearance: She is well-developed.   Cardiovascular:      Rate and Rhythm: Normal rate and regular rhythm.      Heart sounds: Normal heart sounds. No murmur heard.  Pulmonary:      Effort: Pulmonary effort is normal. No respiratory distress.      Breath sounds: Normal breath sounds. No wheezing or rales.   Abdominal:      Palpations: Abdomen is soft.      Tenderness: There is no abdominal tenderness.   Skin:     General: Skin is warm and dry.   Neurological:      Mental Status: She is alert and oriented to person, place, and time.        Result Review :{ Labs  Result Review  Imaging  Med Tab  Media :23}   The following data was reviewed by: Adrienne Munoz MD on 04/19/2022    Common labs    Common Labsle 9/9/21 9/9/21 2/18/22 2/18/22    1631 1631 1321 1321   Glucose    116 (A)   BUN 14   14   Creatinine  0.85  0.80   eGFR Non African Am  67  72   Sodium    140   Potassium    4.2   Chloride    105   Calcium    9.5   Albumin    4.20   Total Bilirubin    0.5   Alkaline Phosphatase    111   AST (SGOT)    19   ALT (SGPT)    17   WBC   6.63    Hemoglobin   13.8    Hematocrit   40.2    Platelets   220    (A) Abnormal value                            Assessment and Plan {CC Problem List  Visit Diagnosis  ROS  Review (Popup)  Health Maintenance  Quality  BestPractice  Medications  SmartSets  SnapShot Encounters  Media :23}   Diagnoses and all orders for this visit:    1. Neck pain (Primary)       -    cyclobenzaprine (FLEXERIL) 5 MG tablet; Take 1 tablet by mouth 3 (Three) Times a Day As Needed for Muscle Spasms.  Dispense: 60 tablet; Refill: 0    2. Cervical " radiculopathy    3. Essential hypertension    4. Skin exam, screening for cancer  -     Ambulatory Referral to Dermatology      Persistent neck pain despite conservative measures, cervical radiculopathy symptoms present.  Will refer to neurosurgery for follow-up after MRI.  Symptoms concerning for radicular symptoms.  Hypertension well controlled, no change in medication at this time.  Needs skin evaluation with dermatology, referral placed.      Follow Up {Instructions Charge Capture  Follow-up Communications :23}   Return in about 3 months (around 7/19/2022) for Recheck.  Patient was given instructions and counseling regarding her condition or for health maintenance advice. Please see specific information pulled into the AVS if appropriate.            This document has been electronically signed by Adrienne Munoz MD

## 2022-05-24 ENCOUNTER — APPOINTMENT (OUTPATIENT)
Dept: PHYSICAL THERAPY | Facility: HOSPITAL | Age: 67
End: 2022-05-24

## 2022-06-06 RX ORDER — CHOLESTYRAMINE 4 G/9G
POWDER, FOR SUSPENSION ORAL
Qty: 180 PACKET | Refills: 3 | OUTPATIENT
Start: 2022-06-06

## 2022-06-07 RX ORDER — DEXLANSOPRAZOLE 60 MG/1
CAPSULE, DELAYED RELEASE ORAL
Qty: 90 CAPSULE | Refills: 0 | OUTPATIENT
Start: 2022-06-07

## 2022-06-08 RX ORDER — DEXLANSOPRAZOLE 60 MG/1
1 CAPSULE, DELAYED RELEASE ORAL DAILY
Qty: 90 CAPSULE | Refills: 0 | Status: SHIPPED | OUTPATIENT
Start: 2022-06-08 | End: 2022-09-06

## 2022-06-08 NOTE — TELEPHONE ENCOUNTER
Incoming Refill Request      Medication requested (name and dose): DEXILANT     Pharmacy where request should be sent: [facility]    Additional details provided by patient:     Best call back number:     Does the patient have less than a 3 day supply:  [] Yes  [] No    Jennifer Perez Rep  06/08/22, 11:29 CDT

## 2022-08-16 ENCOUNTER — TELEPHONE (OUTPATIENT)
Dept: FAMILY MEDICINE CLINIC | Facility: CLINIC | Age: 67
End: 2022-08-16

## 2022-08-16 DIAGNOSIS — Z12.83 SKIN EXAM, SCREENING FOR CANCER: ICD-10-CM

## 2022-08-16 DIAGNOSIS — Z12.31 ENCOUNTER FOR SCREENING MAMMOGRAM FOR MALIGNANT NEOPLASM OF BREAST: Primary | ICD-10-CM

## 2022-08-16 DIAGNOSIS — L98.9 SKIN LESIONS: ICD-10-CM

## 2022-08-16 NOTE — TELEPHONE ENCOUNTER
Patient called asking for a referral for mammogram and Dermatology. Patient was not sure if she needed to be seen before she can get the referral or not. Please advise [phone]

## 2022-08-18 DIAGNOSIS — M54.12 CERVICAL RADICULOPATHY: ICD-10-CM

## 2022-08-18 RX ORDER — AMITRIPTYLINE HYDROCHLORIDE 10 MG/1
10 TABLET, FILM COATED ORAL NIGHTLY
Qty: 30 TABLET | Refills: 2 | Status: SHIPPED | OUTPATIENT
Start: 2022-08-18 | End: 2022-11-30

## 2022-08-18 NOTE — TELEPHONE ENCOUNTER
I have sent mammogram order.  Please find out why she is requesting referral to dermatology, skin exam for cancer screening or something else?  - JANNETTE Munoz

## 2022-08-23 ENCOUNTER — TELEPHONE (OUTPATIENT)
Dept: FAMILY MEDICINE CLINIC | Facility: CLINIC | Age: 67
End: 2022-08-23

## 2022-08-23 NOTE — TELEPHONE ENCOUNTER
Recommendation after last diagnostic mammogram done 1/28/2022, was to return to annual screening mammograms.  There was not any evidence on this mammogram or ultrasound of a left breast mass.  I recommend that screening is done first, then diagnostic if needed for additional evaluation of mass (unless she has had a change in the palpable left breast lesion since this was done).  Is she ok with this? Thanks, JANNETTE Munoz

## 2022-08-23 NOTE — TELEPHONE ENCOUNTER
PT HAS BEEN ORDERED A SCREENING MAMMOGRAM. STATES SHE HAS A LUMP ON HER LEFT BREAST/SIDE. WITH HER HX OF BREAST CANCER, SHE IS REQUESTING A DIAGNOSTIC AND US INSTEAD OF A SCREENING. PLEASE ADVISE

## 2022-09-03 DIAGNOSIS — E78.5 HYPERLIPIDEMIA, UNSPECIFIED HYPERLIPIDEMIA TYPE: ICD-10-CM

## 2022-09-06 DIAGNOSIS — N63.20 MASS OF LEFT BREAST, UNSPECIFIED QUADRANT: Primary | ICD-10-CM

## 2022-09-06 RX ORDER — ATORVASTATIN CALCIUM 20 MG/1
TABLET, FILM COATED ORAL
Qty: 90 TABLET | Refills: 1 | Status: SHIPPED | OUTPATIENT
Start: 2022-09-06 | End: 2023-02-15

## 2022-09-06 RX ORDER — DEXLANSOPRAZOLE 60 MG/1
CAPSULE, DELAYED RELEASE ORAL
Qty: 90 CAPSULE | Refills: 0 | Status: SHIPPED | OUTPATIENT
Start: 2022-09-06 | End: 2022-09-06 | Stop reason: SDUPTHER

## 2022-09-06 RX ORDER — DEXLANSOPRAZOLE 60 MG/1
1 CAPSULE, DELAYED RELEASE ORAL DAILY
Qty: 90 CAPSULE | Refills: 3 | Status: SHIPPED | OUTPATIENT
Start: 2022-09-06

## 2022-09-06 NOTE — TELEPHONE ENCOUNTER
Incoming Refill Request      Medication requested (name and dose):   DEXILANT/PATIENT WANTS NAME BRAND BECAUSE IT WORKS BETTER FOR HER    Pharmacy where request should be sent:   [facility] [place]    Additional details provided by patient:   PT WANTS 90 DAY SUPPLY    Best call back number:   [phone]    Does the patient have less than a 3 day supply:  [x] Yes  [] No    Jennifer Bai Rep  09/06/22, 12:50 CDT

## 2022-09-09 ENCOUNTER — TELEPHONE (OUTPATIENT)
Dept: FAMILY MEDICINE CLINIC | Facility: CLINIC | Age: 67
End: 2022-09-09

## 2022-09-09 NOTE — TELEPHONE ENCOUNTER
Per Dr. Munoz, Ms. Pedro has been called with recent Left Diagnostic Mammogram and Ultrasound results & recommendations.  Continue current medications and follow-up as planned or sooner if any problems.       ----- Message from Adrienne Munoz MD sent at 9/8/2022  5:41 PM CDT -----  Normal ultrasound and mammogram on the left.  If she feels a breast lump that is concerning, please ask that she comes in so that I can evaluate.  - JANNETTE Munoz

## 2022-09-12 ENCOUNTER — TRANSCRIBE ORDERS (OUTPATIENT)
Dept: CT IMAGING | Facility: HOSPITAL | Age: 67
End: 2022-09-12

## 2022-09-12 DIAGNOSIS — R91.1 LUNG NODULE: Primary | ICD-10-CM

## 2022-09-19 ENCOUNTER — HOSPITAL ENCOUNTER (OUTPATIENT)
Dept: CT IMAGING | Facility: HOSPITAL | Age: 67
Discharge: HOME | End: 2022-09-19

## 2022-09-19 ENCOUNTER — LAB (OUTPATIENT)
Dept: LAB | Facility: HOSPITAL | Age: 67
End: 2022-09-19

## 2022-09-19 DIAGNOSIS — R91.1 LUNG NODULE: ICD-10-CM

## 2022-09-19 LAB
BUN SERPL-MCNC: 15 MG/DL (ref 8–23)
CREAT SERPL-MCNC: 0.79 MG/DL (ref 0.57–1)
EGFRCR SERPLBLD CKD-EPI 2021: 83.1 ML/MIN/1.73

## 2022-09-19 PROCEDURE — 71260 CT THORAX DX C+: CPT

## 2022-09-19 PROCEDURE — 84520 ASSAY OF UREA NITROGEN: CPT

## 2022-09-19 PROCEDURE — 36415 COLL VENOUS BLD VENIPUNCTURE: CPT

## 2022-09-19 PROCEDURE — 82565 ASSAY OF CREATININE: CPT

## 2022-09-19 PROCEDURE — 0 IOPAMIDOL PER 1 ML: Performed by: NURSE PRACTITIONER

## 2022-09-19 RX ADMIN — IOPAMIDOL 90 ML: 755 INJECTION, SOLUTION INTRAVENOUS at 11:17

## 2022-09-21 ENCOUNTER — TELEPHONE (OUTPATIENT)
Dept: ONCOLOGY | Facility: CLINIC | Age: 67
End: 2022-09-21

## 2022-09-26 ENCOUNTER — TELEPHONE (OUTPATIENT)
Dept: ONCOLOGY | Facility: HOSPITAL | Age: 67
End: 2022-09-26

## 2022-09-26 NOTE — TELEPHONE ENCOUNTER
----- Message from DAVID Ferguson sent at 9/26/2022 10:45 AM CDT -----  Let her know CT scan looks good; the area we were watching is stable and showing calcification which is good

## 2022-09-26 NOTE — PROGRESS NOTES
Let her know CT scan looks good; the area we were watching is stable and showing calcification which is good

## 2022-11-30 ENCOUNTER — TELEPHONE (OUTPATIENT)
Dept: FAMILY MEDICINE CLINIC | Facility: CLINIC | Age: 67
End: 2022-11-30

## 2022-11-30 ENCOUNTER — OFFICE VISIT (OUTPATIENT)
Dept: FAMILY MEDICINE CLINIC | Facility: CLINIC | Age: 67
End: 2022-11-30

## 2022-11-30 VITALS
BODY MASS INDEX: 30.22 KG/M2 | WEIGHT: 188 LBS | DIASTOLIC BLOOD PRESSURE: 80 MMHG | HEART RATE: 63 BPM | HEIGHT: 66 IN | SYSTOLIC BLOOD PRESSURE: 138 MMHG | OXYGEN SATURATION: 98 %

## 2022-11-30 DIAGNOSIS — E78.5 HYPERLIPIDEMIA, UNSPECIFIED HYPERLIPIDEMIA TYPE: ICD-10-CM

## 2022-11-30 DIAGNOSIS — N64.4 BREAST PAIN, LEFT: ICD-10-CM

## 2022-11-30 DIAGNOSIS — Z85.3 HX: BREAST CANCER: ICD-10-CM

## 2022-11-30 DIAGNOSIS — R73.09 ELEVATED GLUCOSE: ICD-10-CM

## 2022-11-30 DIAGNOSIS — Z90.11 STATUS POST RIGHT MASTECTOMY: ICD-10-CM

## 2022-11-30 DIAGNOSIS — R19.7 DIARRHEA FOLLOWING GASTROINTESTINAL SURGERY: ICD-10-CM

## 2022-11-30 DIAGNOSIS — Z98.890 DIARRHEA FOLLOWING GASTROINTESTINAL SURGERY: ICD-10-CM

## 2022-11-30 DIAGNOSIS — F41.9 ANXIETY: ICD-10-CM

## 2022-11-30 DIAGNOSIS — I10 ESSENTIAL HYPERTENSION: Primary | ICD-10-CM

## 2022-11-30 PROCEDURE — 99214 OFFICE O/P EST MOD 30 MIN: CPT | Performed by: FAMILY MEDICINE

## 2022-11-30 RX ORDER — LISINOPRIL 10 MG/1
10 TABLET ORAL DAILY
Qty: 90 TABLET | Refills: 3 | Status: SHIPPED | OUTPATIENT
Start: 2022-11-30

## 2022-11-30 RX ORDER — CHOLESTYRAMINE 4 G/9G
4 POWDER, FOR SUSPENSION ORAL DAILY
COMMUNITY
End: 2022-12-01 | Stop reason: SDUPTHER

## 2022-11-30 RX ORDER — CITALOPRAM 20 MG/1
20 TABLET ORAL DAILY
Qty: 90 TABLET | Refills: 3 | Status: SHIPPED | OUTPATIENT
Start: 2022-11-30

## 2022-12-01 RX ORDER — CHOLESTYRAMINE 4 G/9G
4 POWDER, FOR SUSPENSION ORAL DAILY
Qty: 90 PACKET | Refills: 3 | Status: SHIPPED | OUTPATIENT
Start: 2022-12-01 | End: 2022-12-02

## 2022-12-01 NOTE — TELEPHONE ENCOUNTER
Incoming Refill Request      Medication requested (name and dose): cholestyramine (QUESTRAN) 4 GM/DOSE powder    Pharmacy where request should be sent: [facility]    Additional details provided by patient: PATIENT WOULD RATHER HAVE PILL FORM IF POSSIBLE    Best call back number: [phone]    Does the patient have less than a 3 day supply:  [x] Yes  [] No    Jennifer Contreras Rep  12/01/22, 11:37 CST

## 2022-12-01 NOTE — TELEPHONE ENCOUNTER
Next Appt  With Family Medicine (Adrienne Munoz MD)  02/27/2023 at 1:00 PM    Patient requesting pill form.  I don't believe it comes in pill form.     Please Advise

## 2022-12-01 NOTE — TELEPHONE ENCOUNTER
Patient called to check on her refill for cholestyramine. She stated she can't eat or go anywhere if she does not take this daily. Patient said it doesn't have to be in pill form she is okay with the power. Please advise [phone]    [facility]

## 2022-12-02 RX ORDER — COLESEVELAM 180 1/1
1875 TABLET ORAL 2 TIMES DAILY WITH MEALS
Qty: 180 TABLET | Refills: 2 | Status: SHIPPED | OUTPATIENT
Start: 2022-12-02 | End: 2023-02-15

## 2023-01-04 ENCOUNTER — OFFICE VISIT (OUTPATIENT)
Dept: SURGERY | Facility: CLINIC | Age: 68
End: 2023-01-04
Payer: COMMERCIAL

## 2023-01-04 VITALS
HEART RATE: 61 BPM | SYSTOLIC BLOOD PRESSURE: 126 MMHG | TEMPERATURE: 97.3 F | OXYGEN SATURATION: 97 % | HEIGHT: 66 IN | BODY MASS INDEX: 30.37 KG/M2 | WEIGHT: 189 LBS | DIASTOLIC BLOOD PRESSURE: 70 MMHG

## 2023-01-04 DIAGNOSIS — N64.4 BREAST PAIN, LEFT: Primary | ICD-10-CM

## 2023-01-04 PROCEDURE — 99202 OFFICE O/P NEW SF 15 MIN: CPT | Performed by: SURGERY

## 2023-01-04 RX ORDER — NITROFURANTOIN MACROCRYSTALS 100 MG/1
100 CAPSULE ORAL DAILY
COMMUNITY
Start: 2022-12-10

## 2023-01-04 NOTE — PROGRESS NOTES
Chief Complaint   Patient presents with   • Advice Only     Consult for left breast lump, Hx breast CA        HPI  67-year-old woman history of breast cancer presents today with a possible left breast mass.  She has had pain in the left breast as well.  No history of skin changes, skin dimpling, nipple discharge, axillary adenopathy.  BI-RADS Category 2 mammograms in 2022.  Past Medical History:   Diagnosis Date   • Acquired equinus deformity of foot     ANKLE   • Anxiety    • Breast cancer (HCC)    • Depression    • Diverticular disease of colon    • Drug therapy    • Esophagitis    • Fibrocystic breast    • GERD (gastroesophageal reflux disease)    • Hx of radiation therapy    • Hyperlipidemia    • Plantar fasciitis    • Primary fibromyalgia syndrome    • Solitary pulmonary nodule present on computed tomography of lung        Past Surgical History:   Procedure Laterality Date   • BREAST BIOPSY     • BREAST SURGERY      Carcinoma of the right breast;Mastectomy   •  SECTION     • CHOLECYSTECTOMY WITH INTRAOPERATIVE CHOLANGIOGRAM N/A 2018    Procedure: LAPAROSCOPIC POSSIBLE OPEN CHOLECYSTECTOMY WITH INTRAOPERATIVE CHOLANGIOGRAM    (C-Arm # 1);  Surgeon: Dirk Leigh MD;  Location: [facility] OR;  Service: General   • COLONOSCOPY  2016    Normal colon.No specimens collected   • COLONOSCOPY N/A 2021    Procedure: COLONOSCOPY;  Surgeon: Brooks Reinoso MD;  Location: [facility] ENDOSCOPY;  Service: Gastroenterology;  Laterality: N/A;   • DIAGNOSTIC LAPAROSCOPY  1977    (Amenorrhea, probable polycystic ovaries. Polycystic ovaries   • ENDOSCOPY N/A 2019    Procedure: ESOPHAGOGASTRODUODENOSCOPY possible dilation;  Surgeon: Brooks Reinoso MD;  Location: [facility] ENDOSCOPY;  Service: Gastroenterology   • ENDOSCOPY N/A 2021    Procedure: ESOPHAGOGASTRODUODENOSCOPY;  Surgeon: Brooks Reinoso MD;  Location: [facility] ENDOSCOPY;  Service: Gastroenterology;  Laterality: N/A;   •  ESOPHAGOSCOPY / EGD  02/22/2016    Mildly severe esophagitis.Gastritis.Normal examined duodenum.Medium sized hiatus hernia   • EXCISION BREAST LESION W/ PREOP NEEDLE LOC  06/17/1987    Bilateral excision of breast masses. Bilateral breast masses; probable fibrocystic disease.   • HYSTEROSCOPY  12/13/2011    Exam under anesthesia, diagnostic hysterectomy with fractional dilation and curettage. Thickened endometrial stripe, on Tamoxifen therapy.   • MASTECTOMY     • OTHER SURGICAL HISTORY  02/12/2016    NEEDLE BIOPSY LYMPH NODES; Ultrasound directed core needle biopsy of the left axilla.         Current Outpatient Medications:   •  albuterol sulfate HFA (ProAir HFA) 108 (90 Base) MCG/ACT inhaler, Inhale 2 puffs Every 4 (Four) Hours As Needed for Wheezing., Disp: 8 g, Rfl: 2  •  atorvastatin (LIPITOR) 20 MG tablet, TAKE 1 TABLET BY MOUTH EVERY DAY, Disp: 90 tablet, Rfl: 1  •  citalopram (CeleXA) 20 MG tablet, Take 1 tablet by mouth Daily., Disp: 90 tablet, Rfl: 3  •  colesevelam (Welchol) 625 MG tablet, Take 3 tablets by mouth 2 (Two) Times a Day With Meals for 90 days. Start once daily to ensure that you tolerate, Disp: 180 tablet, Rfl: 2  •  dexlansoprazole (DEXILANT) 60 MG capsule, Take 1 capsule by mouth Daily., Disp: 90 capsule, Rfl: 3  •  lisinopril (PRINIVIL,ZESTRIL) 10 MG tablet, Take 1 tablet by mouth Daily., Disp: 90 tablet, Rfl: 3  •  Multiple Vitamins-Minerals (WOMENS MULTI PO), Take 1 tablet by mouth Daily., Disp: , Rfl:   •  nitrofurantoin (MACRODANTIN) 100 MG capsule, Take 100 mg by mouth Daily., Disp: , Rfl:   •  fluticasone (FLONASE) 50 MCG/ACT nasal spray, 2 sprays into the nostril(s) as directed by provider Daily As Needed for Rhinitis., Disp: , Rfl:     Allergies   Allergen Reactions   • Erythromycin Other (See Comments)     SEVERE WEAKNESS   • Phenergan [Promethazine Hcl] Other (See Comments)     weakness   • Propofol Other (See Comments)     COUGH/WHEEZE  Runny nose   • Tetracyclines & Related GI  Intolerance       Family History   Problem Relation Age of Onset   • Diabetes Other    • Arthritis Other    • Breast cancer Maternal Aunt        Social History     Socioeconomic History   • Marital status:    Tobacco Use   • Smoking status: Former   • Smokeless tobacco: Never   • Tobacco comments:     Ceased Smoking 12 Years Prior   Vaping Use   • Vaping Use: Never used   Substance and Sexual Activity   • Alcohol use: No   • Drug use: No   • Sexual activity: Defer       Review of Systems   Constitutional: Negative.    HENT: Positive for sinus pressure, sinus pain and trouble swallowing.    Eyes: Negative.    Respiratory: Positive for cough and wheezing.    Cardiovascular: Positive for palpitations.   Gastrointestinal: Positive for abdominal pain.   Endocrine: Negative.    Genitourinary: Positive for frequency.   Musculoskeletal: Positive for arthralgias, back pain, gait problem, joint swelling, neck pain and neck stiffness.   Skin: Positive for rash.   Allergic/Immunologic: Negative.    Hematological: Negative.    Psychiatric/Behavioral:        Anxiety       Physical Exam  Chest:   Breasts:     Breasts are symmetrical.      Right: No inverted nipple, mass, nipple discharge, skin change or tenderness.      Left: Tenderness present. No inverted nipple, mass, nipple discharge or skin change.           ASSESSMENT    Diagnoses and all orders for this visit:    1. Breast pain, left (Primary)  -     US Breast Left Complete; Future        PLAN    1.  Recheck after above study              This document has been electronically signed by Dirk Leigh MD on January 6, 2023 16:39 CST

## 2023-01-24 ENCOUNTER — OFFICE VISIT (OUTPATIENT)
Dept: SURGERY | Facility: CLINIC | Age: 68
End: 2023-01-24
Payer: COMMERCIAL

## 2023-01-24 VITALS
DIASTOLIC BLOOD PRESSURE: 90 MMHG | BODY MASS INDEX: 30.22 KG/M2 | HEIGHT: 66 IN | SYSTOLIC BLOOD PRESSURE: 168 MMHG | WEIGHT: 188 LBS | OXYGEN SATURATION: 98 % | HEART RATE: 65 BPM

## 2023-01-24 DIAGNOSIS — R92.8 ABNORMAL MAMMOGRAM OF LEFT BREAST: Primary | ICD-10-CM

## 2023-01-24 PROCEDURE — 99213 OFFICE O/P EST LOW 20 MIN: CPT | Performed by: SURGERY

## 2023-01-24 RX ORDER — OXYCODONE HYDROCHLORIDE AND ACETAMINOPHEN 5; 325 MG/1; MG/1
1 TABLET ORAL ONCE
Status: CANCELLED | OUTPATIENT
Start: 2023-01-24 | End: 2023-01-24

## 2023-01-24 RX ORDER — LIDOCAINE HYDROCHLORIDE AND EPINEPHRINE 10; 10 MG/ML; UG/ML
30 INJECTION, SOLUTION INFILTRATION; PERINEURAL ONCE
Status: CANCELLED | OUTPATIENT
Start: 2023-01-24 | End: 2023-01-24

## 2023-01-24 RX ORDER — DIAZEPAM 5 MG/1
5 TABLET ORAL ONCE
Status: CANCELLED | OUTPATIENT
Start: 2023-01-24 | End: 2023-01-24

## 2023-01-24 RX ORDER — LANOLIN ALCOHOL/MO/W.PET/CERES
1000 CREAM (GRAM) TOPICAL DAILY
COMMUNITY
End: 2023-03-24

## 2023-01-24 NOTE — PROGRESS NOTES
Chief Complaint   Patient presents with   • Abnormal Breast Imaging     CAT 4          HPI  68 year old with abnormal breast imaging. Previous right mastectomy and chemo for cancer in 2009. No palpable masses, skin dimpling, nipple discharge, axillary adenopathy. Diffuse breast pain.    Study Result    Narrative & Impression   PROCEDURE: US BREAST COMPLETE UNILATERAL, MAMMO BREAST DIAGNOSTIC  TOMOSYNTHESIS LEFT     HISTORY: Hx of right breast cancer- left breast pain, N64.4  Mastodynia     COMPARISON: Previous mammograms 9/8/2022-5/4/2018.      FINDINGS:  Computer-aided detection was utilized during this exam.  Digital breast tomosynthesis was performed.  CC and MLO views were obtained of the left breast.   There are scattered areas of fibroglandular density.  No suspicious mammographic findings are seen.  No significant change compared to the previous exam.     Ultrasound was performed using grayscale and color Doppler  technique of the left breast. Images show a hypoechoic  solid-appearing nodule with slightly irregular margins and mild  posterior acoustic shadowing measuring 4 x 5 x 5 mm, in the 6:00  axis retroareolar region of the left breast.  Ultrasound performed of the left breast in the retroareolar  region also shows a prominent duct containing fluid which appears  benign.     IMPRESSION:  CONCLUSION:    Ultrasound shows a hypoechoic solid-appearing nodule with  slightly irregular margins and mild posterior acoustic shadowing  measuring 4 x 5 x 5 mm, in the 6:00 axis retroareolar region of  the left breast.  Recommend ultrasound-guided biopsy.  BI-RADS Category 4: Suspicious abnormality.  Biopsy should be  considered.      Findings and recommendations were discussed with the patient at  time of exam.  Findings and recommendations  discussed with SLICK MICHELLE on  1/19/2023 at 12:00 PM.     Electronically signed by:  Rick Cardenas MD  1/19/2023 11:57 AM  [facility] Workstation: [id number]     I have personally  reviewed the imaging and concur with the radiologist's findings.    Past Medical History:   Diagnosis Date   • Acquired equinus deformity of foot     ANKLE   • Anxiety    • Breast cancer (HCC)    • Depression    • Diverticular disease of colon    • Drug therapy    • Esophagitis    • Fibrocystic breast    • GERD (gastroesophageal reflux disease)    • Hx of radiation therapy    • Hyperlipidemia    • Plantar fasciitis    • Primary fibromyalgia syndrome    • Solitary pulmonary nodule present on computed tomography of lung        Past Surgical History:   Procedure Laterality Date   • BREAST BIOPSY     • BREAST SURGERY      Carcinoma of the right breast;Mastectomy   •  SECTION     • CHOLECYSTECTOMY WITH INTRAOPERATIVE CHOLANGIOGRAM N/A 2018    Procedure: LAPAROSCOPIC POSSIBLE OPEN CHOLECYSTECTOMY WITH INTRAOPERATIVE CHOLANGIOGRAM    (C-Arm # 1);  Surgeon: Dirk Leigh MD;  Location: [facility] OR;  Service: General   • COLONOSCOPY  2016    Normal colon.No specimens collected   • COLONOSCOPY N/A 2021    Procedure: COLONOSCOPY;  Surgeon: Brooks Reinoso MD;  Location: [facility] ENDOSCOPY;  Service: Gastroenterology;  Laterality: N/A;   • DIAGNOSTIC LAPAROSCOPY  1977    (Amenorrhea, probable polycystic ovaries. Polycystic ovaries   • ENDOSCOPY N/A 2019    Procedure: ESOPHAGOGASTRODUODENOSCOPY possible dilation;  Surgeon: Brooks Reinoso MD;  Location: [facility] ENDOSCOPY;  Service: Gastroenterology   • ENDOSCOPY N/A 2021    Procedure: ESOPHAGOGASTRODUODENOSCOPY;  Surgeon: Brooks Reinoso MD;  Location: [facility] ENDOSCOPY;  Service: Gastroenterology;  Laterality: N/A;   • ESOPHAGOSCOPY / EGD  2016    Mildly severe esophagitis.Gastritis.Normal examined duodenum.Medium sized hiatus hernia   • EXCISION BREAST LESION W/ PREOP NEEDLE LOC  1987    Bilateral excision of breast masses. Bilateral breast masses; probable fibrocystic disease.   • HYSTEROSCOPY  2011    Exam under  anesthesia, diagnostic hysterectomy with fractional dilation and curettage. Thickened endometrial stripe, on Tamoxifen therapy.   • MASTECTOMY     • OTHER SURGICAL HISTORY  02/12/2016    NEEDLE BIOPSY LYMPH NODES; Ultrasound directed core needle biopsy of the left axilla.         Current Outpatient Medications:   •  atorvastatin (LIPITOR) 20 MG tablet, TAKE 1 TABLET BY MOUTH EVERY DAY, Disp: 90 tablet, Rfl: 1  •  citalopram (CeleXA) 20 MG tablet, Take 1 tablet by mouth Daily., Disp: 90 tablet, Rfl: 3  •  dexlansoprazole (DEXILANT) 60 MG capsule, Take 1 capsule by mouth Daily., Disp: 90 capsule, Rfl: 3  •  lisinopril (PRINIVIL,ZESTRIL) 10 MG tablet, Take 1 tablet by mouth Daily., Disp: 90 tablet, Rfl: 3  •  Multiple Vitamins-Minerals (WOMENS MULTI PO), Take 1 tablet by mouth Daily., Disp: , Rfl:   •  nitrofurantoin (MACRODANTIN) 100 MG capsule, Take 100 mg by mouth Daily., Disp: , Rfl:   •  albuterol sulfate HFA (ProAir HFA) 108 (90 Base) MCG/ACT inhaler, Inhale 2 puffs Every 4 (Four) Hours As Needed for Wheezing., Disp: 8 g, Rfl: 2  •  colesevelam (Welchol) 625 MG tablet, Take 3 tablets by mouth 2 (Two) Times a Day With Meals for 90 days. Start once daily to ensure that you tolerate, Disp: 180 tablet, Rfl: 2  •  COLLAGEN PO, Take  by mouth., Disp: , Rfl:   •  fluticasone (FLONASE) 50 MCG/ACT nasal spray, 2 sprays into the nostril(s) as directed by provider Daily As Needed for Rhinitis., Disp: , Rfl:   •  vitamin B-12 (CYANOCOBALAMIN) 1000 MCG tablet, Take 1,000 mcg by mouth Daily., Disp: , Rfl:     Allergies   Allergen Reactions   • Erythromycin Other (See Comments)     SEVERE WEAKNESS   • Phenergan [Promethazine Hcl] Other (See Comments)     weakness   • Propofol Other (See Comments)     COUGH/WHEEZE  Runny nose   • Tetracyclines & Related GI Intolerance       Family History   Problem Relation Age of Onset   • Diabetes Other    • Arthritis Other    • Breast cancer Maternal Aunt        Social History      Socioeconomic History   • Marital status:    Tobacco Use   • Smoking status: Former   • Smokeless tobacco: Never   • Tobacco comments:     Ceased Smoking 12 Years Prior   Vaping Use   • Vaping Use: Never used   Substance and Sexual Activity   • Alcohol use: No   • Drug use: No   • Sexual activity: Defer       Review of Systems   Constitutional: Negative for appetite change, chills, fever and unexpected weight change.   HENT: Negative for hearing loss, nosebleeds and trouble swallowing.    Eyes: Negative for visual disturbance.   Respiratory: Negative for apnea, cough, choking, chest tightness, shortness of breath, wheezing and stridor.    Cardiovascular: Negative for chest pain, palpitations and leg swelling.   Gastrointestinal: Negative for abdominal distention, abdominal pain, blood in stool, constipation, diarrhea, nausea and vomiting.   Endocrine: Negative for cold intolerance, heat intolerance, polydipsia, polyphagia and polyuria.   Genitourinary: Negative for difficulty urinating, dysuria, frequency, hematuria and urgency.   Musculoskeletal: Negative for arthralgias, back pain, myalgias and neck pain.   Skin: Negative for color change, pallor and rash.   Allergic/Immunologic: Negative for immunocompromised state.   Neurological: Negative for dizziness, seizures, syncope, light-headedness, numbness and headaches.   Hematological: Negative for adenopathy.   Psychiatric/Behavioral: Negative for suicidal ideas. The patient is not nervous/anxious.        Physical Exam  Vitals reviewed.   Constitutional:       Appearance: Normal appearance.   Cardiovascular:      Rate and Rhythm: Normal rate and regular rhythm.   Pulmonary:      Effort: Pulmonary effort is normal. No respiratory distress.   Chest:   Breasts:     Right: Absent.      Left: Normal. No swelling, bleeding, inverted nipple, mass, nipple discharge, skin change or tenderness.       Musculoskeletal:      Cervical back: Normal range of motion and  neck supple.   Lymphadenopathy:      Upper Body:      Right upper body: No supraclavicular or axillary adenopathy.      Left upper body: No supraclavicular or axillary adenopathy.   Neurological:      Mental Status: She is alert.           ASSESSMENT    Diagnoses and all orders for this visit:    1. Abnormal mammogram of left breast (Primary)  -     US Guided Breast Biopsy With & Without Device initial Left  -     diazePAM (VALIUM) tablet 5 mg  -     oxyCODONE-acetaminophen (PERCOCET) 5-325 MG per tablet 1 tablet  -     lidocaine 1% - EPINEPHrine 1:188883 (XYLOCAINE W/EPI) 1 %-1:717171 injection 30 mL    Other orders  -     Obtain Informed Consent; Standing  -     TISSUE EXAM, [facility] ([facility]); Standing        PLAN    1. US mammotome with clip placement left breast    The following were discussed with the patient/family:      What are the indications that have led your doctor to the opinion that an operation is necessary?    Breast imaging has determined an abnormal area requiring biopsy.    What, if any, alternative treatments are available for your condition?    Alternatively, open biopsy in the operating room may be performed under sedation or general anesthesia. Observation is not a good option.    What will be the likely result if you don't have the operation?    There is a possibility of missing a breast cancer diagnosis.    What are the basic procedures involved in the operation?    The patient will be placed supine for the procedure. A small incision will be made under local anesthesia, and a special, large needle will be used to perform the biopsy.   A permanent titanium clip will be placed in the breast to elizabeth the site of biopsy should further surgery be necessary.    What are the risks?    The risks of bleeding, infection, the possible need for open biopsy or other procedure, scarring, and poor wound healing are explained. Bruising almost always occurs. There is a low transfusion risk. The risks of  chronic pain are, likewise, low.     How is the operation expected to improve your health or quality of life?    The lesion can usually be determined to be benign or malignant. Follow up xrays or further open excision may be necessary depending on the pathology.    Is hospitalization necessary and, if so, how long can you expect to be hospitalized?    This procedure is performed on an outpatient basis.    What can you expect during your recovery period?    Minimal pain is usually controlled with non-narcotic analgesia.    When can you expect to resume normal activities?    Normal activity may be resumed the following day.    Are there likely to be residual effects from the operation?    Usually, there are no residual effects following biiopsy.    .   All questions were answered. The patient agrees to operation.              This document has been electronically signed by Dirk Leigh MD on January 24, 2023 14:33 CST

## 2023-02-03 ENCOUNTER — HOSPITAL ENCOUNTER (OUTPATIENT)
Dept: ULTRASOUND IMAGING | Facility: HOSPITAL | Age: 68
Discharge: HOME | End: 2023-02-03
Admitting: SURGERY
Payer: MEDICARE

## 2023-02-03 VITALS
DIASTOLIC BLOOD PRESSURE: 60 MMHG | SYSTOLIC BLOOD PRESSURE: 129 MMHG | TEMPERATURE: 97.2 F | OXYGEN SATURATION: 97 % | BODY MASS INDEX: 30.29 KG/M2 | RESPIRATION RATE: 18 BRPM | HEART RATE: 64 BPM | HEIGHT: 66 IN | WEIGHT: 188.49 LBS

## 2023-02-03 DIAGNOSIS — R92.8 ABNORMAL MAMMOGRAM OF LEFT BREAST: ICD-10-CM

## 2023-02-03 PROCEDURE — A4648 IMPLANTABLE TISSUE MARKER: HCPCS

## 2023-02-03 PROCEDURE — 88305 TISSUE EXAM BY PATHOLOGIST: CPT

## 2023-02-03 PROCEDURE — 19083 BX BREAST 1ST LESION US IMAG: CPT | Performed by: SURGERY

## 2023-02-03 RX ORDER — OXYCODONE HYDROCHLORIDE AND ACETAMINOPHEN 5; 325 MG/1; MG/1
1 TABLET ORAL ONCE
Status: COMPLETED | OUTPATIENT
Start: 2023-02-03 | End: 2023-02-03

## 2023-02-03 RX ORDER — LIDOCAINE HYDROCHLORIDE AND EPINEPHRINE 10; 10 MG/ML; UG/ML
30 INJECTION, SOLUTION INFILTRATION; PERINEURAL ONCE
Status: COMPLETED | OUTPATIENT
Start: 2023-02-03 | End: 2023-02-03

## 2023-02-03 RX ORDER — DIAZEPAM 5 MG/1
5 TABLET ORAL ONCE
Status: COMPLETED | OUTPATIENT
Start: 2023-02-03 | End: 2023-02-03

## 2023-02-03 RX ADMIN — LIDOCAINE HYDROCHLORIDE AND EPINEPHRINE 15 ML: 10; 10 INJECTION, SOLUTION INFILTRATION; PERINEURAL at 08:52

## 2023-02-03 RX ADMIN — DIAZEPAM 5 MG: 5 TABLET ORAL at 06:24

## 2023-02-03 RX ADMIN — OXYCODONE HYDROCHLORIDE AND ACETAMINOPHEN 1 TABLET: 5; 325 TABLET ORAL at 06:24

## 2023-02-06 LAB — REF LAB TEST METHOD: NORMAL

## 2023-02-07 ENCOUNTER — OFFICE VISIT (OUTPATIENT)
Dept: SURGERY | Facility: CLINIC | Age: 68
End: 2023-02-07
Payer: COMMERCIAL

## 2023-02-07 VITALS
SYSTOLIC BLOOD PRESSURE: 160 MMHG | BODY MASS INDEX: 30.18 KG/M2 | OXYGEN SATURATION: 98 % | HEART RATE: 76 BPM | DIASTOLIC BLOOD PRESSURE: 88 MMHG | HEIGHT: 66 IN | WEIGHT: 187.8 LBS

## 2023-02-07 DIAGNOSIS — N60.92 ATYPICAL DUCTAL HYPERPLASIA OF LEFT BREAST: Primary | ICD-10-CM

## 2023-02-07 PROCEDURE — 99212 OFFICE O/P EST SF 10 MIN: CPT | Performed by: SURGERY

## 2023-02-07 RX ORDER — BUPIVACAINE HCL/0.9 % NACL/PF 0.1 %
2 PLASTIC BAG, INJECTION (ML) EPIDURAL ONCE
Status: CANCELLED | OUTPATIENT
Start: 2023-02-23 | End: 2023-02-07

## 2023-02-11 NOTE — H&P (VIEW-ONLY)
Chief Complaint   Patient presents with   • Follow-up     Mammotome results         HPI  [facility]   [address] [place] [address]   Phone: [phone] or [phone]   Fax: [phone]   Bhavik Glover M.D., Medical Director     PATIENT NAME                           LABORATORY NO.   [address]                     [id number]   [id number]                         AGE              SEX  N           CLIENT REF #   [facility]           68      1955  F    [id number]   [id number]     [place]                       REQUESTING MTELLO.     ATTENDING M.D.     COPY TO.   [address] [facility]                 [place] [address]             DATE COLLECTED      DATE RECEIVED      DATE REPORTED   2023     DIAGNOSIS:   BREAST, BIOPSY, NEEDLE CORES, LEFT:   Atypical ductal hyperplasia   Microcalcifications  present     JBS/sm     COMMENT:  Dr. Gogo Whalen has reviewed the histology and agrees   with the diagnosis.        Past Medical History:   Diagnosis Date   • Acquired equinus deformity of foot     ANKLE   • Anxiety    • Breast cancer (HCC)    • Depression    • Diverticular disease of colon    • Drug therapy    • Esophagitis    • Fibrocystic breast    • GERD (gastroesophageal reflux disease)    • Hx of radiation therapy    • Hyperlipidemia    • Plantar fasciitis    • Primary fibromyalgia syndrome    • Solitary pulmonary nodule present on computed tomography of lung        Past Surgical History:   Procedure Laterality Date   • BREAST BIOPSY     • BREAST SURGERY      Carcinoma of the right breast;Mastectomy   •  SECTION     • CHOLECYSTECTOMY WITH INTRAOPERATIVE CHOLANGIOGRAM N/A 2018    Procedure: LAPAROSCOPIC POSSIBLE OPEN CHOLECYSTECTOMY WITH INTRAOPERATIVE CHOLANGIOGRAM    (C-Arm # 1);  Surgeon: Slick Leigh MD;  Location:  [facility] OR;  Service: General   • COLONOSCOPY  02/22/2016    Normal colon.No specimens collected   • COLONOSCOPY N/A 03/19/2021    Procedure: COLONOSCOPY;  Surgeon: Brooks Reinoso MD;  Location: [facility] ENDOSCOPY;  Service: Gastroenterology;  Laterality: N/A;   • DIAGNOSTIC LAPAROSCOPY  11/07/1977    (Amenorrhea, probable polycystic ovaries. Polycystic ovaries   • ENDOSCOPY N/A 08/20/2019    Procedure: ESOPHAGOGASTRODUODENOSCOPY possible dilation;  Surgeon: Brooks Reinoso MD;  Location: [facility] ENDOSCOPY;  Service: Gastroenterology   • ENDOSCOPY N/A 03/19/2021    Procedure: ESOPHAGOGASTRODUODENOSCOPY;  Surgeon: Brooks Reinoso MD;  Location: [facility] ENDOSCOPY;  Service: Gastroenterology;  Laterality: N/A;   • ESOPHAGOSCOPY / EGD  02/22/2016    Mildly severe esophagitis.Gastritis.Normal examined duodenum.Medium sized hiatus hernia   • EXCISION BREAST LESION W/ PREOP NEEDLE LOC  06/17/1987    Bilateral excision of breast masses. Bilateral breast masses; probable fibrocystic disease.   • HYSTEROSCOPY  12/13/2011    Exam under anesthesia, diagnostic hysterectomy with fractional dilation and curettage. Thickened endometrial stripe, on Tamoxifen therapy.   • MASTECTOMY     • OTHER SURGICAL HISTORY  02/12/2016    NEEDLE BIOPSY LYMPH NODES; Ultrasound directed core needle biopsy of the left axilla.         Current Outpatient Medications:   •  albuterol sulfate HFA (ProAir HFA) 108 (90 Base) MCG/ACT inhaler, Inhale 2 puffs Every 4 (Four) Hours As Needed for Wheezing., Disp: 8 g, Rfl: 2  •  atorvastatin (LIPITOR) 20 MG tablet, TAKE 1 TABLET BY MOUTH EVERY DAY, Disp: 90 tablet, Rfl: 1  •  citalopram (CeleXA) 20 MG tablet, Take 1 tablet by mouth Daily., Disp: 90 tablet, Rfl: 3  •  colesevelam (Welchol) 625 MG tablet, Take 3 tablets by mouth 2 (Two) Times a Day With Meals for 90 days. Start once daily to ensure that you tolerate, Disp: 180 tablet, Rfl: 2  •  COLLAGEN PO, Take  by mouth., Disp: , Rfl:   •  dexlansoprazole  (DEXILANT) 60 MG capsule, Take 1 capsule by mouth Daily., Disp: 90 capsule, Rfl: 3  •  fluticasone (FLONASE) 50 MCG/ACT nasal spray, 2 sprays into the nostril(s) as directed by provider Daily As Needed for Rhinitis., Disp: , Rfl:   •  lisinopril (PRINIVIL,ZESTRIL) 10 MG tablet, Take 1 tablet by mouth Daily., Disp: 90 tablet, Rfl: 3  •  Multiple Vitamins-Minerals (WOMENS MULTI PO), Take 1 tablet by mouth Daily., Disp: , Rfl:   •  nitrofurantoin (MACRODANTIN) 100 MG capsule, Take 100 mg by mouth Daily., Disp: , Rfl:   •  vitamin B-12 (CYANOCOBALAMIN) 1000 MCG tablet, Take 1,000 mcg by mouth Daily., Disp: , Rfl:     Allergies   Allergen Reactions   • Erythromycin Other (See Comments)     SEVERE WEAKNESS   • Phenergan [Promethazine Hcl] Other (See Comments)     weakness   • Propofol Other (See Comments)     COUGH/WHEEZE  Runny nose   • Tetracyclines & Related GI Intolerance       Family History   Problem Relation Age of Onset   • Diabetes Other    • Arthritis Other    • Breast cancer Maternal Aunt        Social History     Socioeconomic History   • Marital status:    Tobacco Use   • Smoking status: Former   • Smokeless tobacco: Never   • Tobacco comments:     Ceased Smoking 12 Years Prior   Vaping Use   • Vaping Use: Never used   Substance and Sexual Activity   • Alcohol use: No   • Drug use: No   • Sexual activity: Defer         Physical Exam      ASSESSMENT    Diagnoses and all orders for this visit:    1. Atypical ductal hyperplasia of left breast (Primary)  -     Case Request; Standing  -     ceFAZolin (ANCEF) 2 g in sodium chloride 0.9 % 100 mL IVPB  -     Case Request    Other orders  -     Follow Anesthesia Guidelines / Protocol; Future  -     Provide Chlorhexidine Skin Prep Wipes and Instructions; Future  -     Follow Anesthesia Guidelines / Protocol; Standing  -     Obtain Informed Consent; Standing  -     Place sequential compression device- to be placed on patient in Pre-op; Standing  -     Verify /  Perform Chlorhexidine Skin Prep; Standing  -     Verify NPO Status; Standing        PLAN    1.Open breast biopsy left breast    Procedure is explained with the risks of bleeding, infection, open wounds, poor wound healing, scarring. Alternatives include observation, although this risks missing a cancer.              This document has been electronically signed by Dirk Leigh MD on February 10, 2023 19:15 CST

## 2023-02-11 NOTE — PROGRESS NOTES
Chief Complaint   Patient presents with   • Follow-up     Mammotome results         HPI  Reference Lab Report [facility]   [address] [place] [address]   Phone: [phone] or [phone]   Fax: [phone]   Bhavik Golver M.D., Medical Director     PATIENT NAME                           LABORATORY NO.   [address]  JARRED YATES.                     [id number]   [id number]                         AGE              SEX  N           CLIENT REF #   [facility]           68      1955  F    [id number]   [id number]     [place]                       REQUESTING MTELLO.     ATTENDING M.D.     COPY TO.   [address] [facility]                 [place] [address]             DATE COLLECTED      DATE RECEIVED      DATE REPORTED   2023     DIAGNOSIS:   BREAST, BIOPSY, NEEDLE CORES, LEFT:   Atypical ductal hyperplasia   Microcalcifications  present     JBS/sm     COMMENT:  Dr. Gogo Whalen has reviewed the histology and agrees   with the diagnosis.        Past Medical History:   Diagnosis Date   • Acquired equinus deformity of foot     ANKLE   • Anxiety    • Breast cancer (HCC)    • Depression    • Diverticular disease of colon    • Drug therapy    • Esophagitis    • Fibrocystic breast    • GERD (gastroesophageal reflux disease)    • Hx of radiation therapy    • Hyperlipidemia    • Plantar fasciitis    • Primary fibromyalgia syndrome    • Solitary pulmonary nodule present on computed tomography of lung        Past Surgical History:   Procedure Laterality Date   • BREAST BIOPSY     • BREAST SURGERY      Carcinoma of the right breast;Mastectomy   •  SECTION     • CHOLECYSTECTOMY WITH INTRAOPERATIVE CHOLANGIOGRAM N/A 2018    Procedure: LAPAROSCOPIC POSSIBLE OPEN CHOLECYSTECTOMY WITH INTRAOPERATIVE CHOLANGIOGRAM    (C-Arm # 1);  Surgeon: Slick Leigh MD;  Location:  [facility] OR;  Service: General   • COLONOSCOPY  02/22/2016    Normal colon.No specimens collected   • COLONOSCOPY N/A 03/19/2021    Procedure: COLONOSCOPY;  Surgeon: Brooks Reinoso MD;  Location: [facility] ENDOSCOPY;  Service: Gastroenterology;  Laterality: N/A;   • DIAGNOSTIC LAPAROSCOPY  11/07/1977    (Amenorrhea, probable polycystic ovaries. Polycystic ovaries   • ENDOSCOPY N/A 08/20/2019    Procedure: ESOPHAGOGASTRODUODENOSCOPY possible dilation;  Surgeon: Brooks Reinoso MD;  Location: [facility] ENDOSCOPY;  Service: Gastroenterology   • ENDOSCOPY N/A 03/19/2021    Procedure: ESOPHAGOGASTRODUODENOSCOPY;  Surgeon: Brooks Reinoso MD;  Location: [facility] ENDOSCOPY;  Service: Gastroenterology;  Laterality: N/A;   • ESOPHAGOSCOPY / EGD  02/22/2016    Mildly severe esophagitis.Gastritis.Normal examined duodenum.Medium sized hiatus hernia   • EXCISION BREAST LESION W/ PREOP NEEDLE LOC  06/17/1987    Bilateral excision of breast masses. Bilateral breast masses; probable fibrocystic disease.   • HYSTEROSCOPY  12/13/2011    Exam under anesthesia, diagnostic hysterectomy with fractional dilation and curettage. Thickened endometrial stripe, on Tamoxifen therapy.   • MASTECTOMY     • OTHER SURGICAL HISTORY  02/12/2016    NEEDLE BIOPSY LYMPH NODES; Ultrasound directed core needle biopsy of the left axilla.         Current Outpatient Medications:   •  albuterol sulfate HFA (ProAir HFA) 108 (90 Base) MCG/ACT inhaler, Inhale 2 puffs Every 4 (Four) Hours As Needed for Wheezing., Disp: 8 g, Rfl: 2  •  atorvastatin (LIPITOR) 20 MG tablet, TAKE 1 TABLET BY MOUTH EVERY DAY, Disp: 90 tablet, Rfl: 1  •  citalopram (CeleXA) 20 MG tablet, Take 1 tablet by mouth Daily., Disp: 90 tablet, Rfl: 3  •  colesevelam (Welchol) 625 MG tablet, Take 3 tablets by mouth 2 (Two) Times a Day With Meals for 90 days. Start once daily to ensure that you tolerate, Disp: 180 tablet, Rfl: 2  •  COLLAGEN PO, Take  by mouth., Disp: , Rfl:   •  dexlansoprazole  (DEXILANT) 60 MG capsule, Take 1 capsule by mouth Daily., Disp: 90 capsule, Rfl: 3  •  fluticasone (FLONASE) 50 MCG/ACT nasal spray, 2 sprays into the nostril(s) as directed by provider Daily As Needed for Rhinitis., Disp: , Rfl:   •  lisinopril (PRINIVIL,ZESTRIL) 10 MG tablet, Take 1 tablet by mouth Daily., Disp: 90 tablet, Rfl: 3  •  Multiple Vitamins-Minerals (WOMENS MULTI PO), Take 1 tablet by mouth Daily., Disp: , Rfl:   •  nitrofurantoin (MACRODANTIN) 100 MG capsule, Take 100 mg by mouth Daily., Disp: , Rfl:   •  vitamin B-12 (CYANOCOBALAMIN) 1000 MCG tablet, Take 1,000 mcg by mouth Daily., Disp: , Rfl:     Allergies   Allergen Reactions   • Erythromycin Other (See Comments)     SEVERE WEAKNESS   • Phenergan [Promethazine Hcl] Other (See Comments)     weakness   • Propofol Other (See Comments)     COUGH/WHEEZE  Runny nose   • Tetracyclines & Related GI Intolerance       Family History   Problem Relation Age of Onset   • Diabetes Other    • Arthritis Other    • Breast cancer Maternal Aunt        Social History     Socioeconomic History   • Marital status:    Tobacco Use   • Smoking status: Former   • Smokeless tobacco: Never   • Tobacco comments:     Ceased Smoking 12 Years Prior   Vaping Use   • Vaping Use: Never used   Substance and Sexual Activity   • Alcohol use: No   • Drug use: No   • Sexual activity: Defer         Physical Exam      ASSESSMENT    Diagnoses and all orders for this visit:    1. Atypical ductal hyperplasia of left breast (Primary)  -     Case Request; Standing  -     ceFAZolin (ANCEF) 2 g in sodium chloride 0.9 % 100 mL IVPB  -     Case Request    Other orders  -     Follow Anesthesia Guidelines / Protocol; Future  -     Provide Chlorhexidine Skin Prep Wipes and Instructions; Future  -     Follow Anesthesia Guidelines / Protocol; Standing  -     Obtain Informed Consent; Standing  -     Place sequential compression device- to be placed on patient in Pre-op; Standing  -     Verify /  Perform Chlorhexidine Skin Prep; Standing  -     Verify NPO Status; Standing        PLAN    1.Open breast biopsy left breast    Procedure is explained with the risks of bleeding, infection, open wounds, poor wound healing, scarring. Alternatives include observation, although this risks missing a cancer.              This document has been electronically signed by Dirk Leigh MD on February 10, 2023 19:15 CST

## 2023-02-15 ENCOUNTER — PRE-ADMISSION TESTING (OUTPATIENT)
Dept: PREADMISSION TESTING | Facility: HOSPITAL | Age: 68
End: 2023-02-15
Payer: MEDICARE

## 2023-02-15 VITALS
RESPIRATION RATE: 18 BRPM | BODY MASS INDEX: 30.05 KG/M2 | HEIGHT: 66 IN | DIASTOLIC BLOOD PRESSURE: 84 MMHG | HEART RATE: 69 BPM | WEIGHT: 187 LBS | SYSTOLIC BLOOD PRESSURE: 130 MMHG | OXYGEN SATURATION: 97 %

## 2023-02-15 LAB
QT INTERVAL: 404 MS
QTC INTERVAL: 416 MS

## 2023-02-15 PROCEDURE — 93010 ELECTROCARDIOGRAM REPORT: CPT | Performed by: INTERNAL MEDICINE

## 2023-02-15 PROCEDURE — 93005 ELECTROCARDIOGRAM TRACING: CPT

## 2023-02-15 RX ORDER — ATORVASTATIN CALCIUM 20 MG/1
20 TABLET, FILM COATED ORAL DAILY
COMMUNITY
End: 2023-02-27

## 2023-02-15 RX ORDER — SODIUM CHLORIDE, SODIUM GLUCONATE, SODIUM ACETATE, POTASSIUM CHLORIDE AND MAGNESIUM CHLORIDE 526; 502; 368; 37; 30 MG/100ML; MG/100ML; MG/100ML; MG/100ML; MG/100ML
1000 INJECTION, SOLUTION INTRAVENOUS CONTINUOUS PRN
Status: CANCELLED | OUTPATIENT
Start: 2023-02-23

## 2023-02-15 RX ORDER — HYDROXYZINE HYDROCHLORIDE 10 MG/1
10 TABLET, FILM COATED ORAL 3 TIMES DAILY PRN
COMMUNITY
End: 2023-03-06 | Stop reason: SDUPTHER

## 2023-02-15 RX ORDER — COLESEVELAM 180 1/1
625 TABLET ORAL DAILY
COMMUNITY
End: 2023-02-27

## 2023-02-15 NOTE — PAT
Chlorhexidine scrub given with instruction sheet.   Instruction reviewed in PAT, understanding verbalized.

## 2023-02-21 DIAGNOSIS — Z85.3 HX: BREAST CANCER: Primary | ICD-10-CM

## 2023-02-22 ENCOUNTER — OFFICE VISIT (OUTPATIENT)
Dept: ONCOLOGY | Facility: CLINIC | Age: 68
End: 2023-02-22
Payer: MEDICARE

## 2023-02-22 ENCOUNTER — LAB (OUTPATIENT)
Dept: ONCOLOGY | Facility: HOSPITAL | Age: 68
End: 2023-02-22
Payer: MEDICARE

## 2023-02-22 VITALS
DIASTOLIC BLOOD PRESSURE: 71 MMHG | BODY MASS INDEX: 29.94 KG/M2 | OXYGEN SATURATION: 98 % | WEIGHT: 185.5 LBS | RESPIRATION RATE: 18 BRPM | HEART RATE: 71 BPM | SYSTOLIC BLOOD PRESSURE: 159 MMHG

## 2023-02-22 DIAGNOSIS — Z85.3 HX: BREAST CANCER: ICD-10-CM

## 2023-02-22 DIAGNOSIS — J98.4 LUNG CALCIFICATION: Primary | ICD-10-CM

## 2023-02-22 LAB
ALBUMIN SERPL-MCNC: 4.2 G/DL (ref 3.5–5.2)
ALBUMIN/GLOB SERPL: 1.4 G/DL
ALP SERPL-CCNC: 123 U/L (ref 39–117)
ALT SERPL W P-5'-P-CCNC: 17 U/L (ref 1–33)
ANION GAP SERPL CALCULATED.3IONS-SCNC: 12 MMOL/L (ref 5–15)
AST SERPL-CCNC: 23 U/L (ref 1–32)
BASOPHILS # BLD AUTO: 0.03 10*3/MM3 (ref 0–0.2)
BASOPHILS NFR BLD AUTO: 0.5 % (ref 0–1.5)
BILIRUB SERPL-MCNC: 0.8 MG/DL (ref 0–1.2)
BUN SERPL-MCNC: 12 MG/DL (ref 8–23)
BUN/CREAT SERPL: 16 (ref 7–25)
CALCIUM SPEC-SCNC: 9.5 MG/DL (ref 8.6–10.5)
CHLORIDE SERPL-SCNC: 105 MMOL/L (ref 98–107)
CO2 SERPL-SCNC: 23 MMOL/L (ref 22–29)
CREAT SERPL-MCNC: 0.75 MG/DL (ref 0.57–1)
DEPRECATED RDW RBC AUTO: 40.3 FL (ref 37–54)
EGFRCR SERPLBLD CKD-EPI 2021: 87.9 ML/MIN/1.73
EOSINOPHIL # BLD AUTO: 0.12 10*3/MM3 (ref 0–0.4)
EOSINOPHIL NFR BLD AUTO: 2.1 % (ref 0.3–6.2)
ERYTHROCYTE [DISTWIDTH] IN BLOOD BY AUTOMATED COUNT: 12.9 % (ref 12.3–15.4)
GLOBULIN UR ELPH-MCNC: 2.9 GM/DL
GLUCOSE SERPL-MCNC: 109 MG/DL (ref 65–99)
HCT VFR BLD AUTO: 40.5 % (ref 34–46.6)
HGB BLD-MCNC: 13.5 G/DL (ref 12–15.9)
HOLD SPECIMEN: NORMAL
IMM GRANULOCYTES # BLD AUTO: 0.01 10*3/MM3 (ref 0–0.05)
IMM GRANULOCYTES NFR BLD AUTO: 0.2 % (ref 0–0.5)
LYMPHOCYTES # BLD AUTO: 1.74 10*3/MM3 (ref 0.7–3.1)
LYMPHOCYTES NFR BLD AUTO: 29.9 % (ref 19.6–45.3)
MCH RBC QN AUTO: 28.7 PG (ref 26.6–33)
MCHC RBC AUTO-ENTMCNC: 33.3 G/DL (ref 31.5–35.7)
MCV RBC AUTO: 86 FL (ref 79–97)
MONOCYTES # BLD AUTO: 0.38 10*3/MM3 (ref 0.1–0.9)
MONOCYTES NFR BLD AUTO: 6.5 % (ref 5–12)
NEUTROPHILS NFR BLD AUTO: 3.53 10*3/MM3 (ref 1.7–7)
NEUTROPHILS NFR BLD AUTO: 60.8 % (ref 42.7–76)
NRBC BLD AUTO-RTO: 0 /100 WBC (ref 0–0.2)
PLATELET # BLD AUTO: 230 10*3/MM3 (ref 140–450)
PMV BLD AUTO: 10.3 FL (ref 6–12)
POTASSIUM SERPL-SCNC: 4.3 MMOL/L (ref 3.5–5.2)
PROT SERPL-MCNC: 7.1 G/DL (ref 6–8.5)
RBC # BLD AUTO: 4.71 10*6/MM3 (ref 3.77–5.28)
SODIUM SERPL-SCNC: 140 MMOL/L (ref 136–145)
WBC NRBC COR # BLD: 5.81 10*3/MM3 (ref 3.4–10.8)

## 2023-02-22 PROCEDURE — 80053 COMPREHEN METABOLIC PANEL: CPT

## 2023-02-22 PROCEDURE — 85025 COMPLETE CBC W/AUTO DIFF WBC: CPT

## 2023-02-22 PROCEDURE — G0463 HOSPITAL OUTPT CLINIC VISIT: HCPCS | Performed by: NURSE PRACTITIONER

## 2023-02-22 PROCEDURE — 99214 OFFICE O/P EST MOD 30 MIN: CPT | Performed by: NURSE PRACTITIONER

## 2023-02-22 NOTE — PROGRESS NOTES
DATE OF VISIT: 2/22/2023      REASON FOR VISIT:  Yearly follow-up for breast cancer        HISTORY OF PRESENT ILLNESS: 68-year-old female with a past medical history significant for right-sided breast cancer, stage III diagnosed in 2008, status post mastectomy followed by chemotherapy and radiation. She completed 10 yrs of adjuvant hormonal therapy with Tamoxifen followed by Arimidex. She stopped Arimidex in January 2018.   She recently had left breast biopsy that showed atypical ductal hyperplasia; pt is scheduled for open breast biopsy tomorrow.     She has smoking history and was undergoing CT lung screenings when a CT showed right lower lobe nodule; follow-up PET/CT scan showed did not favor malignancy, biopsy was performed and showed no malignant cells and follow up CT in September 2022 that was stable, showing some calcifications favoring benign process. She will due for CT again in September 2023.        Past Medical History, Past Surgical History, Social History, Family History have been reviewed and are without significant changes except as mentioned.    Review of Systems   A comprehensive 14 point review of systems was performed and was negative except as mentioned.    Medications:  The current medication list was reviewed in the EMR    ALLERGIES:    Allergies   Allergen Reactions   • Erythromycin Other (See Comments)     SEVERE WEAKNESS   • Phenergan [Promethazine Hcl] Other (See Comments)     weakness   • Propofol Cough          • Tetracyclines & Related GI Intolerance       Objective      Vitals:    02/22/23 1340   BP: 159/71   Pulse: 71   Resp: 18   SpO2: 98%   Weight: 84.1 kg (185 lb 8 oz)   PainSc:   5     Current Status 2/19/2021   ECOG score 0       Physical Exam  General: alert and oriented no acute distress  Lungs; normal breath sounds  Card; RRR  Ext; no edema    RECENT LABS:  Glucose   Date Value Ref Range Status   02/22/2023 109 (H) 65 - 99 mg/dL Final     Sodium   Date Value Ref Range Status    02/22/2023 140 136 - 145 mmol/L Final   08/21/2019 144 136 - 145 mmol/L Final     Potassium   Date Value Ref Range Status   02/22/2023 4.3 3.5 - 5.2 mmol/L Final     Comment:     Slight hemolysis detected by analyzer. Results may be affected.   08/21/2019 4.1 3.5 - 5.1 mmol/L Final     CO2   Date Value Ref Range Status   02/22/2023 23.0 22.0 - 29.0 mmol/L Final     Chloride   Date Value Ref Range Status   02/22/2023 105 98 - 107 mmol/L Final   08/21/2019 108 (H) 98 - 107 mmol/L Final     Anion Gap   Date Value Ref Range Status   02/22/2023 12.0 5.0 - 15.0 mmol/L Final     Creatinine   Date Value Ref Range Status   02/22/2023 0.75 0.57 - 1.00 mg/dL Final   08/21/2019 0.9 0.6 - 1.0 mg/dL Final     Comment:     **The MDRD GFR formula is valid only for ages 18-70.    GFR will not be reported for individuals aging <18 or >70.     BUN   Date Value Ref Range Status   02/22/2023 12 8 - 23 mg/dL Final   08/21/2019 16 7 - 18 mg/dL Final     BUN/Creatinine Ratio   Date Value Ref Range Status   02/22/2023 16.0 7.0 - 25.0 Final     Calcium   Date Value Ref Range Status   02/22/2023 9.5 8.6 - 10.5 mg/dL Final   08/21/2019 8.8 8.5 - 10.1 mg/dL Final     eGFR Non  Amer   Date Value Ref Range Status   02/18/2022 72 >60 mL/min/1.73 Final     Alkaline Phosphatase   Date Value Ref Range Status   02/22/2023 123 (H) 39 - 117 U/L Final     Total Protein   Date Value Ref Range Status   02/22/2023 7.1 6.0 - 8.5 g/dL Final     ALT (SGPT)   Date Value Ref Range Status   02/22/2023 17 1 - 33 U/L Final     AST (SGOT)   Date Value Ref Range Status   02/22/2023 23 1 - 32 U/L Final     Total Bilirubin   Date Value Ref Range Status   02/22/2023 0.8 0.0 - 1.2 mg/dL Final     Albumin   Date Value Ref Range Status   02/22/2023 4.2 3.5 - 5.2 g/dL Final     Globulin   Date Value Ref Range Status   02/22/2023 2.9 gm/dL Final     Lab Results   Component Value Date    WBC 5.81 02/22/2023    HGB 13.5 02/22/2023    HCT 40.5 02/22/2023    MCV 86.0  "2023     2023     Lab Results   Component Value Date    NEUTROABS 3.53 2023    IRON 42 2016    TIBC 338 2016    LABIRON 12.4 (L) 2016    [id number] 656 2021     Lab Results   Component Value Date    LABCA2 22.9 01/10/2017    REFLABREPO  2023     [facility]  [address] [place] [address]  Phone: [phone] or [phone]  Fax: [phone]  Bhavik Glover M.D., Medical Director    PATIENT NAME                           LABORATORY NO.  [address]  JARRED YATES.                     [id number]  [id number]                         AGE              SEX  SSN           CLIENT REF #  [facility]           68      1955  F    [id number]   [id number]    [place]                       REQUESTING M.D.     ATTENDING M.D.     COPY TO.  [address] [place] [address]                 [place] [address]             DATE COLLECTED      DATE RECEIVED      DATE REPORTED  2023    DIAGNOSIS:  BREAST, BIOPSY, NEEDLE CORES, LEFT:  Atypical ductal hyperplasia  Microcalcifications  present    JBS/sm    COMMENT:  Dr. Gogo Whalen has reviewed the histology and agrees  with the diagnosis.    CLINICAL HISTORY:  Abnormal mammogram of left breast    SPECIMENS RECEIVED:  BREAST, BIOPSY, NEEDLE CORES, LEFT    MICROSCOPIC DESCRIPTION:  Tissue blocks are prepared and slides are examined microscopically on all  specimens. See diagnosis for details.    Professional interpretation rendered by Hilario Tapia M.D. at [facility],  [facility], [address] [place] [address].    GROSS DESCRIPTION:  Specimen is received in 1 formalin filled container labeled \"left breast\" specimen  consists of 6 yellow cores ranging in length from 0.7 to 2.2 cm, and ranging in  diameter from 0.2 to 0.5 cm.  Specimen submitted entirely in 3 cassettes.  Cold  ischemic time 21 " minutes.  Estimated time in formalin approximately 14 to 15  hours.  BKO    REVIEWED, DIAGNOSED AND ELECTRONICALLY  SIGNED BY:    Hilario Tapia M.D.  CPT CODES:  85365           RADIOLOGY DATA :  No radiology results for the last 7 days        Assessment & Plan     1. Infiltrating ductal carcinoma of right breast, stage III, T2 N2, ER positive ER positive HER-2/ary negative by fish diagnosed on March 10, 2007.  Status post mastectomy on right side with lymph node dissection.  Patient received adjuvant chemotherapy in form of Adriamycin and Cytoxan followed by Taxol.  Subsequently patient received adjuvant radiation therapy .  Patient initially received tamoxifen from 2008 until January 2013.  After that in view of 4 lymph node being positive she was changed over to Arimidex in January 2013 and completed 5 additional yrs of therapy; completed in January 2018. She is currently on yearly observation.   Recent breast biopsy showed atypical hyperplasia; pt is schedule for open biopsy tomorrow with Dr. Leigh     2. Right lower lobe nodule; pt with previous 30 yr pack history, has not smoked in past 10 yrs; on her last visit we discussed lung cancer screening and she was agreeable; CT scan from 2020 showed mixed ground glass area in right lower lobe.  PET/CT scan was obtained March 2020 that was read as;  Right lower lobe 1.7 cm groundglass nodular opacity which has  diminished FDG activity with SUV of 2.3. This lesion is most  consistent with inflammatory changes with neoplastic involvement  not favored.   -CT guided biopsy was negative for any malignant cell and CT in September 2021 and 2022 was stable; will repeat CT again in September 2023 and if able to document stability from 2020 will not repeat CT scan.         Discussed patient outpatient code status and at this time pt wishes to be full code; orders entered today.    PHQ-9 Total Score: 2 pt is not suicidal or homicidal; states she has been dealing with some  stressful issue related to her mother being in SNF for recent stroke    Humera Pedro reports a pain score of 5.  Given her pain assessment as noted, treatment options were discussed and the following options were decided upon as a follow-up plan to address the patient's pain: continuation of current treatment plan for pain.         Ashley Antoine, APRN  2/22/2023  14:45 CST        Part of this note may be an electronic transcription/translation of spoken language to printed text using the Dragon Dictation System.          CC:

## 2023-02-23 ENCOUNTER — ANESTHESIA EVENT (OUTPATIENT)
Dept: PERIOP | Facility: HOSPITAL | Age: 68
End: 2023-02-23
Payer: MEDICARE

## 2023-02-23 ENCOUNTER — ANESTHESIA (OUTPATIENT)
Dept: PERIOP | Facility: HOSPITAL | Age: 68
End: 2023-02-23
Payer: MEDICARE

## 2023-02-23 ENCOUNTER — HOSPITAL ENCOUNTER (OUTPATIENT)
Facility: HOSPITAL | Age: 68
Setting detail: HOSPITAL OUTPATIENT SURGERY
Discharge: HOME | End: 2023-02-23
Attending: SURGERY | Admitting: SURGERY
Payer: MEDICARE

## 2023-02-23 VITALS
HEIGHT: 66 IN | HEART RATE: 57 BPM | RESPIRATION RATE: 18 BRPM | DIASTOLIC BLOOD PRESSURE: 59 MMHG | TEMPERATURE: 97.8 F | BODY MASS INDEX: 30.01 KG/M2 | OXYGEN SATURATION: 95 % | SYSTOLIC BLOOD PRESSURE: 131 MMHG | WEIGHT: 186.73 LBS

## 2023-02-23 DIAGNOSIS — N60.92 ATYPICAL DUCTAL HYPERPLASIA OF LEFT BREAST: ICD-10-CM

## 2023-02-23 DIAGNOSIS — N60.99 ATYPICAL DUCTAL HYPERPLASIA OF BREAST: Primary | ICD-10-CM

## 2023-02-23 PROCEDURE — 25010000002 ONDANSETRON PER 1 MG: Performed by: NURSE ANESTHETIST, CERTIFIED REGISTERED

## 2023-02-23 PROCEDURE — 25010000002 PROPOFOL 200 MG/20ML EMULSION: Performed by: NURSE ANESTHETIST, CERTIFIED REGISTERED

## 2023-02-23 PROCEDURE — 25010000002 CEFAZOLIN PER 500 MG: Performed by: SURGERY

## 2023-02-23 PROCEDURE — 25010000002 DIPHENHYDRAMINE PER 50 MG: Performed by: NURSE ANESTHETIST, CERTIFIED REGISTERED

## 2023-02-23 PROCEDURE — 14001 TIS TRNFR TRUNK 10.1-30SQCM: CPT | Performed by: SURGERY

## 2023-02-23 PROCEDURE — 25010000002 KETOROLAC TROMETHAMINE PER 15 MG: Performed by: SURGERY

## 2023-02-23 PROCEDURE — 25010000002 FENTANYL CITRATE (PF) 100 MCG/2ML SOLUTION: Performed by: NURSE ANESTHETIST, CERTIFIED REGISTERED

## 2023-02-23 PROCEDURE — 25010000002 MIDAZOLAM PER 1 MG: Performed by: NURSE ANESTHETIST, CERTIFIED REGISTERED

## 2023-02-23 PROCEDURE — 19120 REMOVAL OF BREAST LESION: CPT | Performed by: SURGERY

## 2023-02-23 PROCEDURE — 25010000002 HYDROMORPHONE 1 MG/ML SOLUTION: Performed by: NURSE ANESTHETIST, CERTIFIED REGISTERED

## 2023-02-23 PROCEDURE — 88305 TISSUE EXAM BY PATHOLOGIST: CPT

## 2023-02-23 DEVICE — CLIP LIGAT VASC HORIZON TI SM/WD RD 6CT: Type: IMPLANTABLE DEVICE | Site: BREAST | Status: FUNCTIONAL

## 2023-02-23 RX ORDER — DIPHENHYDRAMINE HYDROCHLORIDE 50 MG/ML
12.5 INJECTION INTRAMUSCULAR; INTRAVENOUS
Status: DISCONTINUED | OUTPATIENT
Start: 2023-02-23 | End: 2023-02-23 | Stop reason: HOSPADM

## 2023-02-23 RX ORDER — MIDAZOLAM HYDROCHLORIDE 1 MG/ML
INJECTION INTRAMUSCULAR; INTRAVENOUS AS NEEDED
Status: DISCONTINUED | OUTPATIENT
Start: 2023-02-23 | End: 2023-02-23 | Stop reason: SURG

## 2023-02-23 RX ORDER — ACETAMINOPHEN 325 MG/1
650 TABLET ORAL ONCE AS NEEDED
Status: DISCONTINUED | OUTPATIENT
Start: 2023-02-23 | End: 2023-02-23 | Stop reason: HOSPADM

## 2023-02-23 RX ORDER — FLUMAZENIL 0.1 MG/ML
0.2 INJECTION INTRAVENOUS AS NEEDED
Status: DISCONTINUED | OUTPATIENT
Start: 2023-02-23 | End: 2023-02-23 | Stop reason: HOSPADM

## 2023-02-23 RX ORDER — NALOXONE HCL 0.4 MG/ML
0.4 VIAL (ML) INJECTION AS NEEDED
Status: DISCONTINUED | OUTPATIENT
Start: 2023-02-23 | End: 2023-02-23 | Stop reason: HOSPADM

## 2023-02-23 RX ORDER — HYDROCODONE BITARTRATE AND ACETAMINOPHEN 5; 325 MG/1; MG/1
1 TABLET ORAL EVERY 4 HOURS PRN
Qty: 10 TABLET | Refills: 0 | Status: SHIPPED | OUTPATIENT
Start: 2023-02-23 | End: 2023-03-24

## 2023-02-23 RX ORDER — PROMETHAZINE HYDROCHLORIDE 25 MG/1
25 SUPPOSITORY RECTAL ONCE AS NEEDED
Status: DISCONTINUED | OUTPATIENT
Start: 2023-02-23 | End: 2023-02-23 | Stop reason: HOSPADM

## 2023-02-23 RX ORDER — BUPIVACAINE HCL/0.9 % NACL/PF 0.1 %
2 PLASTIC BAG, INJECTION (ML) EPIDURAL ONCE
Status: COMPLETED | OUTPATIENT
Start: 2023-02-23 | End: 2023-02-23

## 2023-02-23 RX ORDER — SODIUM CHLORIDE, SODIUM GLUCONATE, SODIUM ACETATE, POTASSIUM CHLORIDE AND MAGNESIUM CHLORIDE 526; 502; 368; 37; 30 MG/100ML; MG/100ML; MG/100ML; MG/100ML; MG/100ML
1000 INJECTION, SOLUTION INTRAVENOUS CONTINUOUS PRN
Status: DISCONTINUED | OUTPATIENT
Start: 2023-02-23 | End: 2023-02-23 | Stop reason: HOSPADM

## 2023-02-23 RX ORDER — ACETAMINOPHEN 650 MG/1
650 SUPPOSITORY RECTAL ONCE AS NEEDED
Status: DISCONTINUED | OUTPATIENT
Start: 2023-02-23 | End: 2023-02-23 | Stop reason: HOSPADM

## 2023-02-23 RX ORDER — LIDOCAINE HYDROCHLORIDE 10 MG/ML
INJECTION, SOLUTION EPIDURAL; INFILTRATION; INTRACAUDAL; PERINEURAL AS NEEDED
Status: DISCONTINUED | OUTPATIENT
Start: 2023-02-23 | End: 2023-02-23 | Stop reason: SURG

## 2023-02-23 RX ORDER — FENTANYL CITRATE 50 UG/ML
INJECTION, SOLUTION INTRAMUSCULAR; INTRAVENOUS AS NEEDED
Status: DISCONTINUED | OUTPATIENT
Start: 2023-02-23 | End: 2023-02-23 | Stop reason: SURG

## 2023-02-23 RX ORDER — EPHEDRINE SULFATE 50 MG/ML
5 INJECTION, SOLUTION INTRAVENOUS ONCE AS NEEDED
Status: DISCONTINUED | OUTPATIENT
Start: 2023-02-23 | End: 2023-02-23 | Stop reason: HOSPADM

## 2023-02-23 RX ORDER — ONDANSETRON 2 MG/ML
INJECTION INTRAMUSCULAR; INTRAVENOUS AS NEEDED
Status: DISCONTINUED | OUTPATIENT
Start: 2023-02-23 | End: 2023-02-23 | Stop reason: SURG

## 2023-02-23 RX ORDER — ONDANSETRON 2 MG/ML
4 INJECTION INTRAMUSCULAR; INTRAVENOUS ONCE AS NEEDED
Status: COMPLETED | OUTPATIENT
Start: 2023-02-23 | End: 2023-02-23

## 2023-02-23 RX ORDER — PROMETHAZINE HYDROCHLORIDE 25 MG/1
25 TABLET ORAL ONCE AS NEEDED
Status: DISCONTINUED | OUTPATIENT
Start: 2023-02-23 | End: 2023-02-23 | Stop reason: HOSPADM

## 2023-02-23 RX ORDER — KETOROLAC TROMETHAMINE 15 MG/ML
15 INJECTION, SOLUTION INTRAMUSCULAR; INTRAVENOUS ONCE AS NEEDED
Status: COMPLETED | OUTPATIENT
Start: 2023-02-23 | End: 2023-02-23

## 2023-02-23 RX ORDER — PROPOFOL 10 MG/ML
INJECTION, EMULSION INTRAVENOUS AS NEEDED
Status: DISCONTINUED | OUTPATIENT
Start: 2023-02-23 | End: 2023-02-23 | Stop reason: SURG

## 2023-02-23 RX ORDER — MEPERIDINE HYDROCHLORIDE 25 MG/ML
12.5 INJECTION INTRAMUSCULAR; INTRAVENOUS; SUBCUTANEOUS
Status: DISCONTINUED | OUTPATIENT
Start: 2023-02-23 | End: 2023-02-23 | Stop reason: HOSPADM

## 2023-02-23 RX ADMIN — LIDOCAINE HYDROCHLORIDE 50 MG: 10 INJECTION, SOLUTION EPIDURAL; INFILTRATION; INTRACAUDAL; PERINEURAL at 09:05

## 2023-02-23 RX ADMIN — PROPOFOL 80 MG: 10 INJECTION, EMULSION INTRAVENOUS at 09:05

## 2023-02-23 RX ADMIN — FENTANYL CITRATE 50 MCG: 50 INJECTION, SOLUTION INTRAMUSCULAR; INTRAVENOUS at 09:02

## 2023-02-23 RX ADMIN — SODIUM CHLORIDE, SODIUM GLUCONATE, SODIUM ACETATE, POTASSIUM CHLORIDE AND MAGNESIUM CHLORIDE 1000 ML: 526; 502; 368; 37; 30 INJECTION, SOLUTION INTRAVENOUS at 07:23

## 2023-02-23 RX ADMIN — FENTANYL CITRATE 25 MCG: 50 INJECTION, SOLUTION INTRAMUSCULAR; INTRAVENOUS at 09:33

## 2023-02-23 RX ADMIN — ONDANSETRON 4 MG: 2 INJECTION INTRAMUSCULAR; INTRAVENOUS at 09:38

## 2023-02-23 RX ADMIN — Medication 2 G: at 09:09

## 2023-02-23 RX ADMIN — KETOROLAC TROMETHAMINE 15 MG: 15 INJECTION, SOLUTION INTRAMUSCULAR; INTRAVENOUS at 10:40

## 2023-02-23 RX ADMIN — MIDAZOLAM HYDROCHLORIDE 2 MG: 1 INJECTION, SOLUTION INTRAMUSCULAR; INTRAVENOUS at 08:58

## 2023-02-23 RX ADMIN — FENTANYL CITRATE 25 MCG: 50 INJECTION, SOLUTION INTRAMUSCULAR; INTRAVENOUS at 10:09

## 2023-02-23 RX ADMIN — DIPHENHYDRAMINE HYDROCHLORIDE 12.5 MG: 50 INJECTION, SOLUTION INTRAMUSCULAR; INTRAVENOUS at 09:07

## 2023-02-23 RX ADMIN — HYDROMORPHONE HYDROCHLORIDE 0.5 MG: 1 INJECTION, SOLUTION INTRAMUSCULAR; INTRAVENOUS; SUBCUTANEOUS at 10:47

## 2023-02-23 RX ADMIN — ONDANSETRON 4 MG: 2 INJECTION INTRAMUSCULAR; INTRAVENOUS at 10:26

## 2023-02-23 NOTE — ANESTHESIA PREPROCEDURE EVALUATION
Anesthesia Evaluation     Patient summary reviewed and Nursing notes reviewed   history of anesthetic complications (Scopalamine patch applied pre-op. ): PONV  NPO Solid Status: > 8 hours  NPO Liquid Status: > 8 hours           Airway   Mallampati: II  TM distance: >3 FB  Neck ROM: full  Comment: Final Airway Details  Final airway type: endotracheal airway        Successful airway: ETT  Cuffed: yes   Successful intubation technique: video laryngoscopy and RSI  Facilitating devices/methods: cricoid pressure  Endotracheal tube insertion site: oral  Blade: Robert  Blade size: 3  ETT size: 7.0 mm  Cormack-Lehane Classification: grade I - full view of glottis  Placement verified by: chest auscultation   Measured from: gums  ETT to gums (cm): 20  Number of attempts at approach: 1  Dental - normal exam     Pulmonary     breath sounds clear to auscultation  (+) a smoker Former, asthma,  (-) sleep apnea, rhonchi, decreased breath sounds, wheezes, rales  Cardiovascular     ECG reviewed  Rhythm: regular  Rate: normal    (+) hypertension less than 2 medications, hyperlipidemia,   (-) past MI, dysrhythmias, DÍAZ, murmur, cardiac stents, DVT    ROS comment: EK/15/23  Normal sinus rhythm  Normal ECG  When compared with ECG of 15-APR-2020 00:21,  No significant change was found    Neuro/Psych  (+) psychiatric history Anxiety and Depression,    (-) seizures, TIA, CVA  GI/Hepatic/Renal/Endo    (+) obesity,  GERD well controlled,    (-) liver disease, no renal disease, diabetes    Musculoskeletal     (+) myalgias (Fibromyalgia.),   Radiculopathy:     Abdominal   (+) obese,    Substance History   (-) alcohol use, drug use     OB/GYN    (-)  Pregnant        Other      history of cancer (left breast, right breast in remission) active    ROS/Med Hx Other: Hx: Asthma: uses albuterol inhaler about once a year.     Hx: GERD: controlled on Dexilant.     Hx: right breast cancer had chemo/radiation and mastectomy.                    Anesthesia Plan    ASA 3     general     (Allergy to propofol per pt caused runny eyes and nose. )  intravenous induction     Anesthetic plan, risks, benefits, and alternatives have been provided, discussed and informed consent has been obtained with: patient and spouse/significant other.

## 2023-02-23 NOTE — INTERVAL H&P NOTE
H&P reviewed. The patient was examined and there are no changes to the H&P.      Temp:  [97.2 °F (36.2 °C)] 97.2 °F (36.2 °C)  Heart Rate:  [62-71] 62  Resp:  [18] 18  BP: (159-160)/(67-71) 160/67

## 2023-02-23 NOTE — ANESTHESIA POSTPROCEDURE EVALUATION
Patient: Humera Pedro    Procedure Summary     Date: 02/23/23 Room / Location: [facility] OR 03 / [facility] OR    Anesthesia Start: 0859 Anesthesia Stop: 1013    Procedure: LEFT BREAST BIOPSY WITH NEEDLE LOCALIZATION IN OR WITH ULTRASOUND (Left: Breast) Diagnosis:       Atypical ductal hyperplasia of left breast      (Atypical ductal hyperplasia of left breast [N60.92])    Surgeons: Dirk Leigh MD Provider: Nishant Quick CRNA    Anesthesia Type: general ASA Status: 3          Anesthesia Type: general    Vitals  No vitals data found for the desired time range.          Post Anesthesia Care and Evaluation    Patient location during evaluation: PACU  Patient participation: complete - patient participated  Level of consciousness: awake  Pain score: 0  Pain management: adequate    Airway patency: patent  Anesthetic complications: No anesthetic complications  PONV Status: none  Cardiovascular status: acceptable and hemodynamically stable  Respiratory status: acceptable, face mask and spontaneous ventilation  Hydration status: acceptable    Comments: ---------------------------               02/23/23                      0715         ---------------------------   BP:          160/67         Pulse:         62           Resp:          18           Temp:   97.2 °F (36.2 °C)   SpO2:          97%         ---------------------------

## 2023-02-23 NOTE — ANESTHESIA PROCEDURE NOTES
Airway  Urgency: elective    Date/Time: 2/23/2023 9:06 AM  Airway not difficult    General Information and Staff    Patient location during procedure: OR  CRNA/CAA: Candis Collier CRNA    Indications and Patient Condition  Indications for airway management: airway protection    Preoxygenated: yes  Mask difficulty assessment: 1 - vent by mask    Final Airway Details  Final airway type: supraglottic airway      Successful airway: classic  Size 4     Number of attempts at approach: 1

## 2023-02-23 NOTE — DISCHARGE INSTRUCTIONS
Dr. Dirk Leigh  [address] [place] [address] [phone] (office)  [phone] (hospital)  [phone] (cell)    Excisional Breast Biopsy  Postoperative Discharge Instructions         1. Keep dressings dry postoperatively and in place for a total of 24 hours until seen tomorrow by the nurse practitioner in the office.  She will then remove dressings. Can shower and get affected area wet after dressings are removed. Prefer no soaking in the tub for one week. Leave steri-strips in place.   2. A responsible adult should accompany the patient on discharge and for 24 hours following surgery.   3. No heavy lifting (>5 lbs) or strenuous upper arm activity, and no raising of arms over the head until followup appointment.   4. For 24 hours postoperatively, or while taking pain or nausea medications - Do not drive, operate machinery or drink alcohol.   5. Call the office for any of the following symptoms:    Persistent bleeding   Excessive bruising or swelling   Excessive sleepiness or trouble breathing   Severe pain   Persistent nausea or vomiting   Fever greater than 101.   6. Patient should keep the postoperative visit that was scheduled for him/her in the office. If the patient has forgotten this date, please call the clinic for a reminder of the appointment time. If it is after hours, the patient can call the clinic the next business day for this reminder.

## 2023-02-23 NOTE — OP NOTE
BREAST BIOPSY WITH NEEDLE LOCALIZATION  Procedure Note    Humera Pedro  2/23/2023    Pre-op Diagnosis:   Atypical ductal hyperplasia of left breast [N60.92]    Post-op Diagnosis:     Post-Op Diagnosis Codes:     * Atypical ductal hyperplasia of left breast [N60.92]    Procedure:  LEFT BREAST BIOPSY   LOCALIZATION WITH ULTRASOUND  FLAP CLOSURE OF THE BREAST TISSUE    Surgeon:  Dirk Leigh MD      Resident Surgeon:  Malik Hubbard MD    Anesthesia: General    Staff:   Circulator: Bobby Velazquez RN; Alexandria Valentin RN  Scrub Person: Adali Veliz RN  Assistant: Keesha Horton    Assistant: Keesha Horton was responsible for performing the following activities: Retraction, Suction, Suturing, Closing and Placing Dressing and their skilled assistance was necessary for the success of this case.     Estimated Blood Loss: none    Specimens:                ID Type Source Tests Collected by Time   A (Not marked as sent) : FAXIGRAM Tissue Breast, Left TISSUE EXAM, [facility] ([facility], [facility]) Dirk Leigh MD 2/23/2023 0922         Drains: * No LDAs found *    Findings: Area of interest completely excised    Complications: None    Indications: 68-year-old woman with a core needle biopsy demonstrating atypical ductal hyperplasia.  She was brought today to the operating room for open biopsy.    Description: The patient was brought to the operating room and placed supine on the operating table.  Following adequate general endotracheal anesthesia, the left breast was prepped and draped in a sterile manner.  Briefing and timeout were performed and all parties were in agreement.    Ultrasound was used to localize the previously placed mammotome clip and a elizabeth was placed on the skin overlying the clip.  This was the center of the excision site.    Circumareolar incision was made from the 3:00 to the 9 o'clock position on the left breast and carried into the subcutaneous tissue.  The area of interest was completely excised  using electrocautery.  Bleeding was well controlled.    Superior and inferior breast flaps were created dissecting the breast tissue off the chest wall a distance of 5 cm x 5 cm x 5 cm in thickness both above and below the cavity.  This was ultimately closed with 2-0 Vicryl suture to decrease the area of dead space.  The area was marked with a clip.  The skin was brought together with interrupted 3-0 Vicryl's in the subcutaneous fat and subcuticular tissue, and 4-0 subcuticular Vicryl on skin.    X-ray examination of the surgical specimen demonstrated the mammotome clip confirming that the area of interest was completely excised.    The procedure was terminated.  She tolerated well.  Sponge and needle counts were correct.  She was returned to recovery in satisfactory condition.            This document has been electronically signed by Dirk Leigh MD on February 23, 2023 10:10 CST      Date: 2/23/2023  Time: 10:10 CST

## 2023-02-24 ENCOUNTER — OFFICE VISIT (OUTPATIENT)
Dept: SURGERY | Facility: CLINIC | Age: 68
End: 2023-02-24
Payer: COMMERCIAL

## 2023-02-24 VITALS
DIASTOLIC BLOOD PRESSURE: 84 MMHG | SYSTOLIC BLOOD PRESSURE: 138 MMHG | HEART RATE: 63 BPM | TEMPERATURE: 97.6 F | OXYGEN SATURATION: 95 % | HEIGHT: 66 IN | BODY MASS INDEX: 30.5 KG/M2 | WEIGHT: 189.8 LBS

## 2023-02-24 DIAGNOSIS — N60.92 ATYPICAL DUCTAL HYPERPLASIA OF LEFT BREAST: Primary | ICD-10-CM

## 2023-02-24 PROCEDURE — 99024 POSTOP FOLLOW-UP VISIT: CPT | Performed by: NURSE PRACTITIONER

## 2023-02-24 NOTE — PROGRESS NOTES
CHIEF COMPLAINT:   Chief Complaint   Patient presents with   • Post-op Follow-up     Left breast biopsy 2-23-23       HPI: This patient presents for a post-operative visit after undergoing an open left breast biopsy by Dr. Leigh. Patient reports no problems. Minimal pain. Minimal bruising.    PATHOLOGY: Pending    Physical Exam  Incisions healing with no infection, cellulitis or hematoma    ASSESSMENT:    Diagnoses and all orders for this visit:    1. Atypical ductal hyperplasia of left breast (Primary)        PLAN:  1. The patient will follow-up in 1 week for recheck and to discuss pathology results.    2. May shower.   3. May return to normal activity without restrictions.                This document has been electronically signed by DAVID Escalona on February 24, 2023 14:05 CST

## 2023-02-26 DIAGNOSIS — E78.5 HYPERLIPIDEMIA, UNSPECIFIED: ICD-10-CM

## 2023-02-27 LAB — REF LAB TEST METHOD: NORMAL

## 2023-02-27 RX ORDER — ATORVASTATIN CALCIUM 20 MG/1
TABLET, FILM COATED ORAL
Qty: 90 TABLET | Refills: 1 | Status: SHIPPED | OUTPATIENT
Start: 2023-02-27

## 2023-02-27 RX ORDER — COLESEVELAM 180 1/1
TABLET ORAL
Qty: 540 TABLET | Refills: 1 | Status: SHIPPED | OUTPATIENT
Start: 2023-02-27

## 2023-03-01 ENCOUNTER — OFFICE VISIT (OUTPATIENT)
Dept: SURGERY | Facility: CLINIC | Age: 68
End: 2023-03-01
Payer: COMMERCIAL

## 2023-03-01 VITALS
SYSTOLIC BLOOD PRESSURE: 140 MMHG | DIASTOLIC BLOOD PRESSURE: 86 MMHG | TEMPERATURE: 97.9 F | BODY MASS INDEX: 30.37 KG/M2 | HEART RATE: 65 BPM | HEIGHT: 66 IN | OXYGEN SATURATION: 98 % | WEIGHT: 189 LBS

## 2023-03-01 DIAGNOSIS — N60.92 ATYPICAL DUCTAL HYPERPLASIA OF LEFT BREAST: Primary | ICD-10-CM

## 2023-03-01 PROCEDURE — 99024 POSTOP FOLLOW-UP VISIT: CPT | Performed by: NURSE PRACTITIONER

## 2023-03-01 RX ORDER — CHOLESTYRAMINE 4 G/9G
POWDER, FOR SUSPENSION ORAL
COMMUNITY
Start: 2023-02-27 | End: 2023-03-06

## 2023-03-01 NOTE — PROGRESS NOTES
CHIEF COMPLAINT:   Chief Complaint   Patient presents with   • Post-op     reval       HPI: This patient presents for a post-operative visit after undergoing an open left breast biopsy by Dr. Leigh. Patient reports no problems. Minimal pain. Minimal bruising.       PATHOLOGY:       Physical Exam  Incisions healing with no infection, cellulitis or hematoma.      ASSESSMENT:    Diagnoses and all orders for this visit:    1. Atypical ductal hyperplasia of left breast (Primary)  -     Mammo diagnostic digital tomosynthesis left w CAD; Future  -     US Breast Left Limited; Future        PLAN:  1. The patient will repeat imaging and exam in 3 months or sooner if concerns arise.                 This document has been electronically signed by DAVID Escalona on March 1, 2023 13:10 CST

## 2023-03-06 ENCOUNTER — OFFICE VISIT (OUTPATIENT)
Dept: FAMILY MEDICINE CLINIC | Facility: CLINIC | Age: 68
End: 2023-03-06
Payer: COMMERCIAL

## 2023-03-06 VITALS
SYSTOLIC BLOOD PRESSURE: 120 MMHG | WEIGHT: 188 LBS | BODY MASS INDEX: 30.22 KG/M2 | HEART RATE: 63 BPM | DIASTOLIC BLOOD PRESSURE: 80 MMHG | OXYGEN SATURATION: 98 % | HEIGHT: 66 IN

## 2023-03-06 DIAGNOSIS — F41.9 ANXIETY: ICD-10-CM

## 2023-03-06 DIAGNOSIS — R73.09 ELEVATED GLUCOSE: ICD-10-CM

## 2023-03-06 DIAGNOSIS — I10 ESSENTIAL HYPERTENSION: Primary | ICD-10-CM

## 2023-03-06 DIAGNOSIS — Z79.899 MEDICATION MANAGEMENT: ICD-10-CM

## 2023-03-06 DIAGNOSIS — E66.9 CLASS 1 OBESITY WITH SERIOUS COMORBIDITY AND BODY MASS INDEX (BMI) OF 30.0 TO 30.9 IN ADULT, UNSPECIFIED OBESITY TYPE: ICD-10-CM

## 2023-03-06 DIAGNOSIS — E78.5 HYPERLIPIDEMIA, UNSPECIFIED HYPERLIPIDEMIA TYPE: ICD-10-CM

## 2023-03-06 PROCEDURE — 99214 OFFICE O/P EST MOD 30 MIN: CPT | Performed by: FAMILY MEDICINE

## 2023-03-06 RX ORDER — HYDROXYZINE HYDROCHLORIDE 10 MG/1
10 TABLET, FILM COATED ORAL 3 TIMES DAILY PRN
Qty: 90 TABLET | Refills: 2 | Status: SHIPPED | OUTPATIENT
Start: 2023-03-06

## 2023-03-06 NOTE — PROGRESS NOTES
Chief Complaint  Hypertension    Subjective    History of Present Illness {CC  Problem List  Visit  Diagnosis   Encounters  Notes  Medications  Labs  Result Review Imaging  Media :23}     Humera Pedro presents to [facility] [place] for     Chief Complaint   Patient presents with   • Hypertension      Patient seen today for follow-up.  His chronic medical problems including hypertension, hyperlipidemia, anxiety, GERD and obesity.  Notes that she has a lot of situational factors that are contributing to her anxiety, making things worse.  She currently is taking Celexa 20 mg daily.  Uses hydroxyzine 5 to 10 mg daily when needed.  Blood pressure has been controlled, patient is taking lisinopril 10 mg daily.  Notes that she is having difficulty with weight loss.  Patient does not eat a large quantity of food.  She does eat out a lot.  Does go walking regularly for exercise.      Current Outpatient Medications:   •  albuterol sulfate HFA (ProAir HFA) 108 (90 Base) MCG/ACT inhaler, Inhale 2 puffs Every 4 (Four) Hours As Needed for Wheezing., Disp: 8 g, Rfl: 2  •  atorvastatin (LIPITOR) 20 MG tablet, TAKE 1 TABLET BY MOUTH EVERY DAY, Disp: 90 tablet, Rfl: 1  •  citalopram (CeleXA) 20 MG tablet, Take 1 tablet by mouth Daily., Disp: 90 tablet, Rfl: 3  •  colesevelam (WELCHOL) 625 MG tablet, TAKE 3 TABLETS BY MOUTH 2 TIMES A DAY WITH MEALS. START ONCE DAILY TO ENSURE THAT YOU TOLERATE, Disp: 540 tablet, Rfl: 1  •  COLLAGEN PO, Take  by mouth Daily., Disp: , Rfl:   •  dexlansoprazole (DEXILANT) 60 MG capsule, Take 1 capsule by mouth Daily., Disp: 90 capsule, Rfl: 3  •  HYDROcodone-acetaminophen (NORCO) 5-325 MG per tablet, Take 1 tablet by mouth Every 4 (Four) Hours as needed for pain., Disp: 10 tablet, Rfl: 0  •  hydrOXYzine (ATARAX) 10 MG tablet, Take 1 tablet by mouth 3 (Three) Times a Day As Needed for Anxiety., Disp: 90 tablet, Rfl: 2  •  lisinopril  "(PRINIVIL,ZESTRIL) 10 MG tablet, Take 1 tablet by mouth Daily., Disp: 90 tablet, Rfl: 3  •  Multiple Vitamins-Minerals (WOMENS MULTI PO), Take 1 tablet by mouth Daily., Disp: , Rfl:   •  nitrofurantoin (MACRODANTIN) 100 MG capsule, Take 1 capsule by mouth Daily., Disp: , Rfl:   •  Probiotic Product (PROBIOTIC PO), Take  by mouth Daily., Disp: , Rfl:   •  vitamin B-12 (CYANOCOBALAMIN) 1000 MCG tablet, Take 1 tablet by mouth Daily., Disp: , Rfl:      Objective       Vital Signs:   /80   Pulse 63   Ht 167.6 cm (66\")   Wt 85.3 kg (188 lb)   SpO2 98%   BMI 30.34 kg/m²     Physical Exam  Vitals reviewed.   Constitutional:       General: She is not in acute distress.     Appearance: She is well-developed.   Cardiovascular:      Rate and Rhythm: Normal rate and regular rhythm.      Heart sounds: Normal heart sounds. No murmur heard.  Pulmonary:      Effort: Pulmonary effort is normal. No respiratory distress.      Breath sounds: Normal breath sounds. No wheezing or rales.   Skin:     General: Skin is warm and dry.   Neurological:      Mental Status: She is alert and oriented to person, place, and time.        Result Review :{ Labs  Result Review  Imaging  Med Tab  Media :23}   The following data was reviewed by: Adrienne Munoz MD on 03/06/2023    Common labs    Common Labs 2/22/23 2/22/23    1323 1323   Glucose  109 (A)   BUN  12   Creatinine  0.75   Sodium  140   Potassium  4.3   Chloride  105   Calcium  9.5   Albumin  4.2   Total Bilirubin  0.8   Alkaline Phosphatase  123 (A)   AST (SGOT)  23   ALT (SGPT)  17   WBC 5.81    Hemoglobin 13.5    Hematocrit 40.5    Platelets 230    (A) Abnormal value       Comments are available for some flowsheets but are not being displayed.                   Assessment and Plan {CC Problem List  Visit Diagnosis  ROS  Review (Popup)  Health Maintenance  Quality  BestPractice  Medications  SmartSets  SnapShot Encounters  Media :23}   Diagnoses and all orders for " this visit:    1. Essential hypertension (Primary)    2. Hyperlipidemia, unspecified hyperlipidemia type    3. Anxiety  -     hydrOXYzine (ATARAX) 10 MG tablet; Take 1 tablet by mouth 3 (Three) Times a Day As Needed for Anxiety.  Dispense: 90 tablet; Refill: 2    4. Elevated glucose    5. Class 1 obesity with serious comorbidity and body mass index (BMI) of 30.0 to 30.9 in adult, unspecified obesity type  -     Semaglutide-Weight Management 0.25 MG/0.5ML solution auto-injector; Inject 0.25 mg under the skin into the appropriate area as directed 1 (One) Time Per Week.  Dispense: 2 mL; Refill: 2     Patient seen today for follow-up  Hypertension well-controlled, continue lisinopril  Continue Lipitor for hyperlipidemia  Celexa and hydroxyzine helping with anxiety  A lot of her current symptoms are situational related to the health of her mother and relationship with her   Discussed options of counseling and/or social work consultation, patient will let me know if she needs either of these  Can take hydroxyzine, no risk of tolerance or addiction to this medication  Check lipid panel and hemoglobin A1c, as patient has had elevated glucose levels  Family history of diabetes in her mom  Patient's (Body mass index is 30.34 kg/m².) indicates that they are obese (BMI >30) with health related conditions that include hypertension, dyslipidemias and GERD . Weight is unchanged. BMI is is above average; BMI management plan is completed. We discussed low calorie, low carb based diet program, portion control, increasing exercise and pharmacologic options including Wegovy.   Risks and benefits of this medication discussed      Follow Up {Instructions Charge Capture  Follow-up Communications :23}   Return in about 2 months (around 5/6/2023) for Recheck.  Patient was given instructions and counseling regarding her condition or for health maintenance advice. Please see specific information pulled into the AVS if  appropriate.            This document has been electronically signed by Adrienne Munoz MD

## 2023-04-05 ENCOUNTER — LAB (OUTPATIENT)
Dept: LAB | Facility: HOSPITAL | Age: 68
End: 2023-04-05
Payer: MEDICARE

## 2023-04-05 DIAGNOSIS — Z85.3 HX: BREAST CANCER: ICD-10-CM

## 2023-04-05 LAB
ALBUMIN SERPL-MCNC: 4 G/DL (ref 3.5–5.2)
ALBUMIN/GLOB SERPL: 1.4 G/DL
ALP SERPL-CCNC: 125 U/L (ref 39–117)
ALT SERPL W P-5'-P-CCNC: 16 U/L (ref 1–33)
ANION GAP SERPL CALCULATED.3IONS-SCNC: 9 MMOL/L (ref 5–15)
AST SERPL-CCNC: 19 U/L (ref 1–32)
BASOPHILS # BLD AUTO: 0.04 10*3/MM3 (ref 0–0.2)
BASOPHILS NFR BLD AUTO: 0.7 % (ref 0–1.5)
BILIRUB SERPL-MCNC: 0.7 MG/DL (ref 0–1.2)
BUN SERPL-MCNC: 12 MG/DL (ref 8–23)
BUN/CREAT SERPL: 16.2 (ref 7–25)
CALCIUM SPEC-SCNC: 9.4 MG/DL (ref 8.6–10.5)
CHLORIDE SERPL-SCNC: 105 MMOL/L (ref 98–107)
CO2 SERPL-SCNC: 25 MMOL/L (ref 22–29)
CREAT SERPL-MCNC: 0.74 MG/DL (ref 0.57–1)
DEPRECATED RDW RBC AUTO: 44.2 FL (ref 37–54)
EGFRCR SERPLBLD CKD-EPI 2021: 89.4 ML/MIN/1.73
EOSINOPHIL # BLD AUTO: 0.17 10*3/MM3 (ref 0–0.4)
EOSINOPHIL NFR BLD AUTO: 2.9 % (ref 0.3–6.2)
ERYTHROCYTE [DISTWIDTH] IN BLOOD BY AUTOMATED COUNT: 13.5 % (ref 12.3–15.4)
GLOBULIN UR ELPH-MCNC: 2.8 GM/DL
GLUCOSE SERPL-MCNC: 110 MG/DL (ref 65–99)
HCT VFR BLD AUTO: 39.4 % (ref 34–46.6)
HGB BLD-MCNC: 12.9 G/DL (ref 12–15.9)
IMM GRANULOCYTES # BLD AUTO: 0.03 10*3/MM3 (ref 0–0.05)
IMM GRANULOCYTES NFR BLD AUTO: 0.5 % (ref 0–0.5)
LYMPHOCYTES # BLD AUTO: 1.9 10*3/MM3 (ref 0.7–3.1)
LYMPHOCYTES NFR BLD AUTO: 32.9 % (ref 19.6–45.3)
MCH RBC QN AUTO: 29.4 PG (ref 26.6–33)
MCHC RBC AUTO-ENTMCNC: 32.7 G/DL (ref 31.5–35.7)
MCV RBC AUTO: 89.7 FL (ref 79–97)
MONOCYTES # BLD AUTO: 0.42 10*3/MM3 (ref 0.1–0.9)
MONOCYTES NFR BLD AUTO: 7.3 % (ref 5–12)
NEUTROPHILS NFR BLD AUTO: 3.21 10*3/MM3 (ref 1.7–7)
NEUTROPHILS NFR BLD AUTO: 55.7 % (ref 42.7–76)
NRBC BLD AUTO-RTO: 0 /100 WBC (ref 0–0.2)
PLATELET # BLD AUTO: 238 10*3/MM3 (ref 140–450)
PMV BLD AUTO: 11.2 FL (ref 6–12)
POTASSIUM SERPL-SCNC: 4.5 MMOL/L (ref 3.5–5.2)
PROT SERPL-MCNC: 6.8 G/DL (ref 6–8.5)
RBC # BLD AUTO: 4.39 10*6/MM3 (ref 3.77–5.28)
SODIUM SERPL-SCNC: 139 MMOL/L (ref 136–145)
WBC NRBC COR # BLD: 5.77 10*3/MM3 (ref 3.4–10.8)

## 2023-04-05 PROCEDURE — 36415 COLL VENOUS BLD VENIPUNCTURE: CPT

## 2023-04-05 PROCEDURE — 80053 COMPREHEN METABOLIC PANEL: CPT

## 2023-04-05 PROCEDURE — 85025 COMPLETE CBC W/AUTO DIFF WBC: CPT

## 2023-05-26 DIAGNOSIS — E66.9 CLASS 1 OBESITY WITH SERIOUS COMORBIDITY AND BODY MASS INDEX (BMI) OF 30.0 TO 30.9 IN ADULT, UNSPECIFIED OBESITY TYPE: ICD-10-CM

## 2023-06-01 DIAGNOSIS — F41.9 ANXIETY: ICD-10-CM

## 2023-06-01 RX ORDER — HYDROXYZINE HYDROCHLORIDE 10 MG/1
TABLET, FILM COATED ORAL
Qty: 270 TABLET | Refills: 3 | Status: SHIPPED | OUTPATIENT
Start: 2023-06-01

## 2023-06-16 RX ORDER — SEMAGLUTIDE 0.68 MG/ML
INJECTION, SOLUTION SUBCUTANEOUS
Qty: 3 ML | Refills: 1 | Status: SHIPPED | OUTPATIENT
Start: 2023-06-16

## 2023-08-25 DIAGNOSIS — E78.5 HYPERLIPIDEMIA, UNSPECIFIED: ICD-10-CM

## 2023-08-25 RX ORDER — COLESEVELAM 180 1/1
TABLET ORAL
Qty: 540 TABLET | Refills: 1 | Status: SHIPPED | OUTPATIENT
Start: 2023-08-25

## 2023-08-25 RX ORDER — ATORVASTATIN CALCIUM 20 MG/1
TABLET, FILM COATED ORAL
Qty: 90 TABLET | Refills: 1 | Status: SHIPPED | OUTPATIENT
Start: 2023-08-25

## 2023-09-24 ENCOUNTER — TRANSCRIBE ORDERS (OUTPATIENT)
Dept: CT IMAGING | Facility: HOSPITAL | Age: 68
End: 2023-09-24
Payer: COMMERCIAL

## 2023-09-24 DIAGNOSIS — J98.4 CALCIFICATION OF LUNG: Primary | ICD-10-CM

## 2023-09-27 ENCOUNTER — HOSPITAL ENCOUNTER (OUTPATIENT)
Dept: CT IMAGING | Facility: HOSPITAL | Age: 68
Discharge: HOME | End: 2023-09-27
Payer: COMMERCIAL

## 2023-09-27 ENCOUNTER — LAB (OUTPATIENT)
Dept: LAB | Facility: HOSPITAL | Age: 68
End: 2023-09-27
Payer: MEDICARE

## 2023-09-27 DIAGNOSIS — J98.4 LUNG CALCIFICATION: ICD-10-CM

## 2023-09-27 DIAGNOSIS — J98.4 CALCIFICATION OF LUNG: ICD-10-CM

## 2023-09-27 LAB
BUN SERPL-MCNC: 13 MG/DL (ref 8–23)
CREAT SERPL-MCNC: 0.71 MG/DL (ref 0.57–1)
EGFRCR SERPLBLD CKD-EPI 2021: 93.9 ML/MIN/1.73

## 2023-09-27 PROCEDURE — 71260 CT THORAX DX C+: CPT

## 2023-09-27 PROCEDURE — 25510000001 IOPAMIDOL 61 % SOLUTION: Performed by: NURSE PRACTITIONER

## 2023-09-27 PROCEDURE — 36415 COLL VENOUS BLD VENIPUNCTURE: CPT

## 2023-09-27 PROCEDURE — 84520 ASSAY OF UREA NITROGEN: CPT

## 2023-09-27 PROCEDURE — 82565 ASSAY OF CREATININE: CPT

## 2023-09-27 RX ORDER — SODIUM CHLORIDE 9 MG/ML
30 INJECTION, SOLUTION INTRAVENOUS
Status: COMPLETED | OUTPATIENT
Start: 2023-09-27 | End: 2023-09-27

## 2023-09-27 RX ADMIN — SODIUM CHLORIDE 30 ML: 9 INJECTION, SOLUTION INTRAVENOUS at 12:30

## 2023-09-27 RX ADMIN — IOPAMIDOL 100 ML: 612 INJECTION, SOLUTION INTRAVENOUS at 12:30

## (undated) DEVICE — GLV SURG SENSICARE PI PF LF 7 GRN STRL

## (undated) DEVICE — CANN SMPL SOFTECH BIFLO ETCO2 A/M 7FT

## (undated) DEVICE — BITEBLOCK ENDO W/STRAP 60F A/ LF DISP

## (undated) DEVICE — TBG PENCL TELESCP MEGADYNE SMOKE EVAC 10FT

## (undated) DEVICE — MASK,OXYGEN,MED CONC,ADLT,7' TUB, UC: Brand: PENDING

## (undated) DEVICE — STERILE POLYISOPRENE POWDER-FREE SURGICAL GLOVES WITH EMOLLIENT COATING: Brand: PROTEXIS

## (undated) DEVICE — GLV SURG TRIUMPH LT PF LTX 7.5 STRL

## (undated) DEVICE — RETRV BG SPECI ENDOBAG 3X6IN 20.9CM

## (undated) DEVICE — ADHS LIQ MASTISOL 2/3ML

## (undated) DEVICE — SINGLE-USE BIOPSY FORCEPS: Brand: RADIAL JAW 4

## (undated) DEVICE — ST CVR PROB PULLUP ULTRASND 5X48IN

## (undated) DEVICE — PK MAJ PROC LF 60

## (undated) DEVICE — DEV SPECI TRANSPEC W/PERF PLT

## (undated) DEVICE — ANTIBACTERIAL UNDYED BRAIDED (POLYGLACTIN 910), SYNTHETIC ABSORBABLE SUTURE: Brand: COATED VICRYL

## (undated) DEVICE — 3M™ STERI-STRIP™ REINFORCED ADHESIVE SKIN CLOSURES, R1547, 1/2 IN X 4 IN (12 MM X 100 MM), 6 STRIPS/ENVELOPE: Brand: 3M™ STERI-STRIP™

## (undated) DEVICE — GLV SURG TRIUMPH PF LTX 6.5 STRL

## (undated) DEVICE — VISUALIZATION SYSTEM: Brand: CLEARIFY

## (undated) DEVICE — SOL IRRIG NACL 1000ML

## (undated) DEVICE — GLV SURG SENSICARE PI LF PF 7.5 GRN STRL

## (undated) DEVICE — ADHS SKIN PREMIERPRO EXOFIN TOPICAL HI/VISC .5ML

## (undated) DEVICE — THE KUMAR CATHETER®, USED IN CONJUNCTION WITH KUMAR CHOLANGIOGRAPHY® CLAMP, IS MEANT TO PROVIDE A MEANS OF LAPAROSCOPIC CHOLANGIOGRAPHY. IT COMPRISES A TRANSLUCENT TUBING ( 76 CM. LENGTH AND 16 GA. ) THAT CARRIES A 19 GA., 1.25 CM LONG NEEDLE AT THE END. THE KUMAR CATHETER® IS USED TO PUNCTURE THE HARTMANN'S POUCH OF THE GALLBLADDER FOR BILIARY ACCESS AND / OR ASPIRATION. PRODUCT IS LATEX FREE.: Brand: KUMAR CATHETER®

## (undated) DEVICE — SUT VIC 2/0 SH 27IN

## (undated) DEVICE — PK LAP CHOLE LF 60

## (undated) DEVICE — MONOPOLAR METZENBAUM SCISSOR TIP, DISPOSABLE: Brand: MONOPOLAR METZENBAUM SCISSOR TIP, DISPOSABLE

## (undated) DEVICE — SPNG GZ WOVN 4X4IN 12PLY 10/BX STRL

## (undated) DEVICE — GLV SURG TRIUMPH LT PF LTX 7 STRL

## (undated) DEVICE — ENDOPATH XCEL BLADELESS TROCARS WITH STABILITY SLEEVES: Brand: ENDOPATH XCEL

## (undated) DEVICE — SPECIMEN ORIENTATION CHARMS, SIX DISTINCTLY SHAPED STERILE 10MM CHARMS: Brand: MARGINMAP

## (undated) DEVICE — GLV SURG SENSICARE PI LF PF 8 GRN STRL

## (undated) DEVICE — DECANT BG O JET

## (undated) DEVICE — BNDG ELAS ELITE V/CLOSE 6IN 5YD LF STRL